# Patient Record
Sex: MALE | Race: WHITE | NOT HISPANIC OR LATINO | Employment: OTHER | URBAN - METROPOLITAN AREA
[De-identification: names, ages, dates, MRNs, and addresses within clinical notes are randomized per-mention and may not be internally consistent; named-entity substitution may affect disease eponyms.]

---

## 2017-01-27 ENCOUNTER — TRANSCRIBE ORDERS (OUTPATIENT)
Dept: ADMINISTRATIVE | Facility: HOSPITAL | Age: 77
End: 2017-01-27

## 2017-01-27 DIAGNOSIS — I73.9 PERIPHERAL VASCULAR DISEASE, UNSPECIFIED (HCC): Primary | ICD-10-CM

## 2017-02-02 ENCOUNTER — HOSPITAL ENCOUNTER (OUTPATIENT)
Dept: NON INVASIVE DIAGNOSTICS | Facility: HOSPITAL | Age: 77
Discharge: HOME | End: 2017-02-02
Payer: COMMERCIAL

## 2017-02-02 DIAGNOSIS — I73.9 PERIPHERAL VASCULAR DISEASE, UNSPECIFIED (HCC): ICD-10-CM

## 2017-02-02 PROCEDURE — 93925 LOWER EXTREMITY STUDY: CPT

## 2017-02-02 PROCEDURE — 93923 UPR/LXTR ART STDY 3+ LVLS: CPT

## 2017-12-20 ENCOUNTER — ALLSCRIPTS OFFICE VISIT (OUTPATIENT)
Dept: OTHER | Facility: OTHER | Age: 77
End: 2017-12-20

## 2017-12-21 ENCOUNTER — ALLSCRIPTS OFFICE VISIT (OUTPATIENT)
Dept: OTHER | Facility: OTHER | Age: 77
End: 2017-12-21

## 2017-12-21 NOTE — CONSULTS
Assessment   1  Diabetes mellitus (250 00) (E11 9)    Plan   Diabetes mellitus    · Jardiance 25 MG Oral Tablet; TAKE 1 TABLET BY MOUTH ONCE DAILY   Rx By: Romelia Mace; Dispense: 30 Days ; #:30 Tablet; Refill: 5;For: Diabetes mellitus; DAYNA = N; Faxed To: [facility]   · MetFORMIN HCl ER (MOD) 500 MG Oral Tablet Extended Release 24 Hour; 1 tab    bid   Rx By: Romelia Mace; Dispense: 0 Days ; #:60 Tablet Extended Release 24 Hour; Refill: 6;For: Diabetes mellitus; DAYNA = N; Faxed To: [facility]   · Trulicity 1 60 WG/6 3UI Subcutaneous Solution Pen-injector; Inject 0 75 mg once    weekly   Rx By: Romelia Mace; Dispense: 0 Days ; #:1 X 0 5 ML Pen (4 Pens); Refill: 6;For: Diabetes mellitus; DAYNA = N; Faxed To: [facility]   · *1 - [facility] DIABETES SELF MANAGEMENT TRAINING OUTPATIENT Co-Management  *     Status: Hold For - Scheduling  Requested for: [id number]   Ordered; For: Diabetes mellitus; Ordered By: Romelia Mace Performed:  Due: [id number]  Instruction : Yes  requires instruction : Yes  Needs requiring Individual DSMT? : No  Self-Management Education/Trainng : Individual Education  Care Summary provided  : Yes   · *1 - [facility] MEDICAL NUTRITION THERAPY FOR DIABETES Co-Management  *  Status:    Need Information - Financial Authorization  Requested for: [id number]   Ordered; For: Diabetes mellitus; Ordered By: Romelia Mace Performed:  Due: 46SCX5086  Care Summary provided  : Yes   · Follow-up visit in 1 month Evaluation and Treatment  Follow-up  Status: Hold For -    Scheduling  Requested for: 59Bwy1142   Ordered; For: Diabetes mellitus; Ordered By: Romelia Mace Performed:  Due: 73MCP2495    Discussion/Summary   Discussion Summary:    Uncontrolled type 2 diabetes: Discussed with the patient that based on his A1c as well as random sugar from his lab work, that he likely needs insulin for treatment of his diabetes   He would like to avoid insulin if at all possible  I suggested that he meet with diabetes educator to learn to uses glucometer again as well as to do basic medical nutrition therapy for diabetes  Additionally, I think he needs at least 3 additional non-insulin medications  I suggested adding metformin back with the extended-release form with 500 mg twice a day  I also suggested Jardiance 25 mg daily and Trulicity 6 38 mg weekly  I provided coupon cards for these medications and prescribed them to the pharmacy  The patient does not have prescription coverage as he opted out this in the past  Therefore, I am worried that these medications would be too costly  In that case, we would need to start insulin and, given his lack of prescription coverage, would likely use [facility] insulin such as NovoLog 70/30 pens or Novolin files  In either case, I still do not suspect that the addition of these 3 noninsulin medications will have the patient at goal for his blood sugars and A1c and at that point insulin would be the next step  Asked the patient to send in blood sugar logs in 2 weeks for review  Will see him back in 1 month to assess the status and see if we need to switch to insulin treatment at that time  Counseling Documentation With Imm: The patient was counseled regarding diagnostic results,-- instructions for management,-- impressions,-- importance of compliance with treatment  Medication SE Review and Pt Understands Tx: Possible side effects of new medications were reviewed with the patient/guardian today  The treatment plan was reviewed with the patient/guardian  The patient/guardian understands and agrees with the treatment plan      Chief Complaint   Chief Complaint Free Text Note Form: consult      History of Present Illness   HPI: 29-year-old male presents to establish care for type 2 diabetes  This was diagnosed about 10 years ago and he denies any known complications   He follows with his eye doctor every year as well as podiatrist regularly and denies any microvascular complications  He currently takes Actos 45 mg daily and glipizide ER 10 mg b i d  In the past he was on metformin but had diarrhea on this so this was discontinued  He has not been checking his blood sugar at home, but does report having a glucometer  He denies polyuria, polyphagia, nocturia  Denies hypoglycemia  Denies heart disease or history of stroke  He has not met with and educator or dietician recently  He reports being conscious with his diet  Denies history of nausea, vomiting, abdominal pain, pancreatitis, family history of thyroid cancer  Review of Systems   Endo Adult ROS Male New Patient:      Constitutional/General: no change in ring size,-- no change in shoe size,-- no chills,-- dizziness,-- no fainting,-- fatigue,-- no fever,-- no forgetfulness,-- no headache,-- loss of sleep,-- no recent weight loss,-- no nervousness,-- no numbness,-- no temperature intolerance,-- no excessive sweating-- and-- no weight gain  Muscle/Joint/Bone: leg pain-- and-- leg weakness, but-- no arm pain,-- no back pain,-- no hip pain,-- no foot pain,-- no neck pain,-- no hand pain,-- no shoulder pain,-- no arm weakness,-- no back weakness,-- no hip weakness,-- no foot weakness,-- no neck weakness,-- no hand weakness,-- no shoulder weakness,-- no arm numbness,-- no back numbness,-- no hip numbness,-- no leg numbness,-- no foot numbness,-- no neck numbness,-- no hand numbness-- and-- no shoulder numbness  Gastrointestinal: no constipation,-- no diarrhea,-- no excessive hunger,-- no excessive thirst,-- no nausea,-- no poor appetite,-- no rectal bleeding,-- no stomach pain,-- no vomiting-- and-- no vomiting blood  Cardiovascular: no chest pain,-- no hypertension,-- no irregular heart beat,-- no hypotension,-- no poor circulation,-- no rapid heart beat-- and-- no ankle swelling        Eye/Ear/Nose/Throat: no bleeding gums,-- no blurred vision,-- no difficulty swallowing,-- no double vision,-- no gritty eyes,-- no hoarseness,-- no hearing loss,-- no persistent cough,-- no sinus problems,-- not seeing flashes-- and-- not seeing halos  Skin: no easy bruising,-- no hives,-- no itching,-- no change in a mole,-- no rashes,-- no scar-- and-- no slow healing sores  Genitourinary no blood in urine,-- no frequent urination,-- no night time urination-- and-- no painful urination  Genitourinary - Reproductive erection difficulties, but-- no breast lump  ROS Reviewed:    ROS reviewed  Active Problems   1  Aftercare following joint replacement surgery (V54 81) (Z47 1)   2  Diabetes mellitus (250 00) (E11 9)   3  Femoral neck fracture (820 8) (S72 009A)   4  Greater trochanteric bursitis of left hip (726 5) (M70 62)   5  Hypertension (401 9) (I10)   6  Raynaud phenomenon (443 0) (I73 00)    Past Medical History   Active Problems And Past Medical History Reviewed: The active problems and past medical history were reviewed and updated today  Surgical History   1  History of Hip Replacement  Surgical History Reviewed: The surgical history was reviewed and updated today  Family History   Sister    1  Family history of    2  Family history of cardiac disorder (V17 49) (Z82 49)  Brother    3  Family history of cardiac disorder (V17 49) (Z82 49)   4  Family history of diabetes mellitus (V18 0) (Z83 3)  Family History Reviewed: The family history was reviewed and updated today  Social History    · Former smoker (N38 22) (P76 556)   · Pipe smoker (305 1) (F17 290)   · Rarely consumes alcohol (V49 89) (Z78 9)  Social History Reviewed: The social history was reviewed and updated today  Current Meds    1  Calcium + D TABS; Therapy: (Recorded:2016) to Recorded   2  Fosamax 70 MG Oral Tablet; TAKE 1 TABLET ONCE WEEKLY; Therapy: (Recorded:44Ikf1559) to Recorded   3  MetFORMIN HCl - 1000 MG Oral Tablet; TAKE 1 TABLET TWICE DAILY;      Therapy: (DGYRRXMU:[id number]) to Recorded   4  Vitamin D3 TABS; Therapy: (Recorded:16Mar2016) to Recorded  Medication List Reviewed: The medication list was reviewed and updated today  Allergies   1  No Known Drug Allergies    Vitals   Vital Signs    Recorded: 02Ynn6733 09:31AM   Heart Rate 88   Systolic 708   Diastolic 78   Height 5 ft 10 in   Weight 185 lb 8 oz   BMI Calculated 26 62   BSA Calculated 2 02     Physical Exam        Constitutional      General appearance: No acute distress, well appearing and well nourished  Eyes      Conjunctiva and lids: No swelling, erythema, or discharge  Pupils: Equal, round and reactive to light  The sclera are anicteric  Extraocular movements are intact  Ears, Nose, Mouth, and Throat      Neck: The neck is supple  The thyroid is normal in size with no palpable nodules  Pulmonary      Respiratory effort: No increased work of breathing or signs of respiratory distress  Auscultation of lungs: Clear to auscultation bilaterally with normal chest expansion  Cardiovascular      Auscultation of heart: Normal rate and rhythm with no murmurs, gallops or rubs  Examination of extremities for edema and/or varicosities: Normal        Abdomen      Abdomen: Abdomen is soft, non-tender with normal bowel sounds  Lymphatic      Palpation of lymph nodes: No supraclavicular or suboccipital lymphadenopathy  Skin      Skin and subcutaneous tissue: Normal skin temperature and color  Neurologic      Motor Strength: Strength is 5/5 bilaterally         Psychiatric      Orientation to person, place and time: Normal        Mood and affect: Affect and attention span are normal        Results/Data   Office Record Review: I have reviewed laboratory results as follows: 11/30/2017: 13, serum glucose 377, creatinine 1 07, EGFR 78, electrolytes normal, LFTs normal, 25 D 34, , triglyceride 217, urine microalbumin to creatinine ratio 29 1   urine free cortisol 40 (0-50), TSH 1 750        Signatures    Electronically signed by : MYNOR Sparks ; Dec 20 2017 10:21AM EST                       (Author)

## 2018-01-11 ENCOUNTER — ALLSCRIPTS OFFICE VISIT (OUTPATIENT)
Dept: OTHER | Facility: OTHER | Age: 78
End: 2018-01-11

## 2018-01-23 VITALS
SYSTOLIC BLOOD PRESSURE: 134 MMHG | DIASTOLIC BLOOD PRESSURE: 78 MMHG | BODY MASS INDEX: 26.56 KG/M2 | HEIGHT: 70 IN | WEIGHT: 185.5 LBS | HEART RATE: 88 BPM

## 2018-01-23 NOTE — PROGRESS NOTES
Plan    1  DSMT/MNT Time Record; Status:Complete;   Done: [id number] 12:00AM    Discussion/Summary    PATIENT EDUCATION RECORD   Indication for Services: type 2 Diabetes Mellitus  He is ready to learn  Reviewed meter options: Method: Instruction  Monitoring:   Discussed target hemoglobin A1c: Method: Instruction and Handout  Response: Verbalizes Understanding  Response: Verbalizes Understanding  Discussed Finger stick testing: Method: Instruction  Response: Verbalizes Understanding  Discussed proper stick techniques: Method: Instruction  Response: Verbalizes Understanding  Discussed testing times: Method: Instruction  Response: Verbalizes Understanding  Discussed safe lancet disposal: Method: Instruction  Response: Verbalizes Understanding  Discussed options for record keeping: Method: Instruction  Response: Verbalizes Understanding  Discussed reporting of readings to M D : Method: Instruction and Handout  Response: Verbalizes Understanding  He was instructed how to use the meter  Taking Medication: Response:  He demonstrated syringe/pen administration  Discussed site selection and rotation of injections  Method: Instruction  Response: [employer]  Discussed safe needle disposal  Method: Instruction  Response: [employer]  Discussed medication storage  Method: Instruction  Response: Verbalizes Instruction  ~He/She needs a follow up class in three months   He was given the following educational materials: Know Your Blood Glucose Numbers   Chief Complaint  Type 2 Diabetes      History of Present Illness  Met with Ramsey Flannery for DSMT initial visit, meter education and Trulicity teaching  He took ABC group class 10 years ago, doesn't remember much  States that for the first few years was controlled with just diet, but has slowly progressed and gotten worse  States he is now told his numbers were high, but doesn't have much understanding of what that means   Works 10pm to 7am, sleep 9am-5pm, works at [employer] overnight shift 14yrs  Lives alone, in charge of himself  Does not have prescription insurance coverage, states he never needed it before  So getting medication may be a challenge  Metformin only at present, Dr Tod Layne added [place] and [place]  Gave him coupon cards for each of these, which he activated already  I told him to tell them at the pharmacy that we wants to pay cash for his medications and present the coupons, see how much it will be  If he cannot afford the medication, please contact Dr Tod Layne to discuss other options  Insulin would likely be the best fix for his high sugars, but Hezekiah Bamberger was resistant to this at his Endo appt  We spent time discussing it in office today to dispel myths  Has a meter at home that is old, never used it  I instructed him on use of a meter, blood sugar in office 403 fasting coffee only  This was a shock to him, he recognizes now that yes his sugars truly are high  Discussed goal blood sugar ranges of premeal , after meal <180  Discussed it will take months to get to this range, do not be discouraged by 300s, he needs to test and send them in to Dr Tod Layne every 2 weeks for monitoring and review  Knows If sugars are low, would drink milk or eat candy  No foot issues, has a foot doctor goes there regularly  Eye doctor yearly this year, goes annually  Trulicity education completed as well, including storage, site selection and rotation, side effects, and timing  He will take it every Wednesday  Hezekiah Bamberger seemed to get confused at times, mixing up things that I said (âI do the Trulicity twice a day right? â- no, please test twice a day, Trulicity is once a week)  I typed out complete instructions for him to take with him- see below  He will get a Relion meter from [facility] to start testing as this is his cheapest option  He will return to me mid-January for blood sugar log review and initial MNT   Hezekiah Bamberger tells me his brother is a patient here as well, I encouraged him to have them work together on getting blood sugar control  Instructions I typed out for Divina Guidry:  When at the pharmacy, tell them you do not have prescription insurance and want to be cash pay  Then use the coupons  Call the coupons to activate them first before going to the pharmacy  If medications are too expensive and you won't be taking them, please let Dr Ash Powell know  [employer] brand meter - Relion Meter- no prescription, pay cash over the counter    - Need to buy a Relion meter, test strips for the meter, and Lancets (little needle)  - Test blood sugars before breakfast when you get home from work, and test when you wake up before leaving for work  - Write blood sugars on the papers we gave you and send them to Dr Ash Powell to look at every 2 weeks  Trulicity Medication- Injection once a week- Will take it Wednesday mornings  Keep all of them in the fridge  Before you take the medication, take it out of the fridge to let it warm up a little before taking          Future Appointments    Date/Time Provider Specialty Site   01/25/2018 09:15 AM ADALBERTO Pineda Endocrinology [facility] ENDOCRINOLOGY  Kourtney Woo   01/11/2018 08:30 AM Casey Holcomb RD, CDE Diabetes Educator [facility] ENDOCRINOLOGY  Donelda Foot   Electronically signed by : Rhett Stevenson, [address] [place] [address]; Dec 21 2017  1:15PM EST                       (Author)    Electronically signed by : MYNOR Colindres ; Abilio  3 2018 10:22AM EST

## 2018-01-25 ENCOUNTER — OFFICE VISIT (OUTPATIENT)
Dept: DIABETES SERVICES | Facility: HOSPITAL | Age: 78
End: 2018-01-25
Payer: COMMERCIAL

## 2018-01-25 ENCOUNTER — OFFICE VISIT (OUTPATIENT)
Dept: ENDOCRINOLOGY | Facility: HOSPITAL | Age: 78
End: 2018-01-25
Payer: COMMERCIAL

## 2018-01-25 VITALS — BODY MASS INDEX: 26.63 KG/M2 | HEIGHT: 70 IN | WEIGHT: 186 LBS

## 2018-01-25 VITALS
HEART RATE: 60 BPM | BODY MASS INDEX: 26.66 KG/M2 | DIASTOLIC BLOOD PRESSURE: 70 MMHG | WEIGHT: 186.2 LBS | SYSTOLIC BLOOD PRESSURE: 142 MMHG | HEIGHT: 70 IN

## 2018-01-25 DIAGNOSIS — E55.9 VITAMIN D DEFICIENCY: ICD-10-CM

## 2018-01-25 DIAGNOSIS — E78.2 MIXED HYPERLIPIDEMIA: ICD-10-CM

## 2018-01-25 DIAGNOSIS — IMO0001 UNCONTROLLED TYPE 2 DIABETES MELLITUS WITHOUT COMPLICATION, WITHOUT LONG-TERM CURRENT USE OF INSULIN: Primary | ICD-10-CM

## 2018-01-25 DIAGNOSIS — R79.89 DECREASED TESTOSTERONE LEVEL: ICD-10-CM

## 2018-01-25 DIAGNOSIS — M81.0 AGE RELATED OSTEOPOROSIS: ICD-10-CM

## 2018-01-25 DIAGNOSIS — E11.65 HYPERGLYCEMIA DUE TO TYPE 2 DIABETES MELLITUS (HCC): Primary | ICD-10-CM

## 2018-01-25 PROCEDURE — 99214 OFFICE O/P EST MOD 30 MIN: CPT | Performed by: NURSE PRACTITIONER

## 2018-01-25 PROCEDURE — 97802 MEDICAL NUTRITION INDIV IN: CPT | Performed by: DIETITIAN, REGISTERED

## 2018-01-25 PROCEDURE — 99999 PR OFFICE/OUTPT VISIT,PROCEDURE ONLY: CPT | Performed by: DIETITIAN, REGISTERED

## 2018-01-25 RX ORDER — METFORMIN HYDROCHLORIDE 500 MG/1
500 TABLET, EXTENDED RELEASE ORAL 2 TIMES DAILY WITH MEALS
Refills: 0 | COMMUNITY
Start: 2018-01-16 | End: 2018-10-19 | Stop reason: SDUPTHER

## 2018-01-25 RX ORDER — CLOBETASOL PROPIONATE 0.5 MG/G
CREAM TOPICAL
Status: ON HOLD | COMMUNITY
End: 2021-02-28 | Stop reason: CLARIF

## 2018-01-25 RX ORDER — GLIPIZIDE 10 MG/1
10 TABLET, FILM COATED, EXTENDED RELEASE ORAL DAILY
COMMUNITY
Start: 2016-10-21

## 2018-01-25 RX ORDER — AMPICILLIN TRIHYDRATE 250 MG
CAPSULE ORAL EVERY 24 HOURS
Status: ON HOLD | COMMUNITY
End: 2021-02-28 | Stop reason: ALTCHOICE

## 2018-01-25 RX ORDER — ERGOCALCIFEROL 1.25 MG/1
50000 CAPSULE ORAL WEEKLY
Refills: 0 | COMMUNITY
Start: 2018-01-05

## 2018-01-25 RX ORDER — PIOGLITAZONEHYDROCHLORIDE 45 MG/1
45 TABLET ORAL DAILY
COMMUNITY
Start: 2017-05-26

## 2018-01-25 RX ORDER — LANOLIN ALCOHOL/MO/W.PET/CERES
1000 CREAM (GRAM) TOPICAL DAILY
COMMUNITY

## 2018-01-25 RX ORDER — MELATONIN
DAILY
COMMUNITY
End: 2021-05-06 | Stop reason: SDUPTHER

## 2018-01-25 RX ORDER — ALENDRONATE SODIUM 70 MG/1
TABLET ORAL WEEKLY
COMMUNITY

## 2018-01-25 NOTE — PATIENT INSTRUCTIONS
Test blood sugars and then take 15units of insulin before breakfast and before dinner       Meter: put the larger end of the test strip, the end with the green on it- into the meter    Taking insulin:   - Fill the syringe with 15units of air first  - inject the air into the vial first   - flip the vial over, pull out 15units of insulin, watching for air bubbles and to not bend the needle  - inject in the stomach

## 2018-01-25 NOTE — PATIENT INSTRUCTIONS
Continue Actos 45 mg Daily  Continue Metformin ER 1000 mg twice daily  Discontinue Glipizide  Start Novolin 70/30  -  15 units twice daily at breakfast and dinner  Check blood sugars 4 times daily and return record in 2 weeks for evaluation  Meet with Diabetes Education and continue to learn about diabetes and diet

## 2018-01-25 NOTE — PROGRESS NOTES
Follow Up - Sofia Valdivia 78 y o  male MRN: [id number] @ Encounter: [id number]      Assessment/Plan     Assessment: This is a 78y o -year-old male with type 2 diabetes with hyperglycemia, hyperlipidemia, hypogonadism and osteoporosis  Plan:  1  Type 2 diabetes with hyperglycemia:  He has been having difficulty with his glucometer to accurately record his blood sugar readings  He denies any recent hypoglycemia symptoms, polyuria, polydipsia or polyphagia  He will review the process for properly checking his blood sugars with the diabetic educator today  I have asked him to stop glipizide  He will start treatment Novolin 70/30-15 units at breakfast and dinner, in addition to his Actos and metformin  He will continue to check his blood sugars 4 times daily and for the record to the office in 1-2 weeks for review and adjustment, if necessary  Reviewed recognition and proper treatment of hypoglycemic episodes  Check hemoglobin A1c, comprehensive metabolic panel and urine microalbumin  2   Hyperlipidemia:  Continue red yeast rice  Check fasting lipid panel  3   Low testosterone:  He states that he has tried AndroGel/Androderm in the past with little success  Check testosterone free and total     4   Osteoporosis:  Continue Fosamax with supplementation of calcium and ergocalciferol  He is due for a DEXA scan in April 2018  Discussed fall risk and safety  CC: Diabetes Follow Up    History of Present Illness     HPI: 78 y o  male presents in the office today for follow up of Type 2 Diabetes  he states he has been diagnosed with Type 2 Diabetes for 10 years and is checking blood glucose readings 3 times daily, however he was having difficulty successfully performing his blood glucose testing  He presents today with no blood sugar readings or meter for download  he denies episodes of hypoglycemia, polydipsia, polyphagia and polyuria      Lab Results   Component Value Date    HGBA1C 9 3 (H) 05/22/2015     Current Diabetic medication regimen is as follows: Actos 45 milligrams daily  Glipizide ER 10 milligrams daily  Metformin ER 1000 mg twice daily  He will be meeting with the diabetic educator after his office visit today to learn proper injection of insulin  He will be starting with Novolin 70/30 -15 units twice daily  he states he is up to date with his annual diabetic eye exam with Dr Miriam Reyez and denies retinopathy  he complaints of dry/cracked heels to his feet and does follow up with  podiatry for regular diabetic foot care  Last visit was earlier today  He has a history of low testosterone  He has no current testosterone free and total levels available for review  He states he has tried Androgel/Androdrem in the past with little success  For his hyperlipidemia, he is not currently taking any medication  He is currently utilizing Red Yeast Rice  For his osteoporosis, he is taking Fosamax 70 milligrams daily  He is supplementing with calcium 1200 milligrams daily and vitamin-D 66600 units weekly  He is due for his next DEXA scan in April 2018  Consults    Review of Systems   Constitutional: Negative  HENT: Negative  Eyes: Negative  Respiratory: Negative  Cardiovascular: Negative  Gastrointestinal: Negative  Endocrine: Negative  Genitourinary: Negative  Musculoskeletal: Positive for myalgias (General)  Skin: Negative  Allergic/Immunologic: Negative  Neurological: Negative  Hematological: Negative  Psychiatric/Behavioral: Negative  All other systems reviewed and are negative  Historical Information   Past Medical History:   Diagnosis Date    Hyperlipidemia      History reviewed  No pertinent surgical history    Social History   History   Alcohol use Not on file     History   Drug use: Unknown     History   Smoking Status    Current Every Day Smoker    Types: Pipe   Smokeless Tobacco    Never Used     Family History:   Family History   Problem Relation Age of Onset    No Known Problems Mother     No Known Problems Father        Meds/Allergies   Current Outpatient Prescriptions   Medication Sig Dispense Refill    alendronate (FOSAMAX) 70 mg tablet 1 tablet      aspirin 81 MG tablet every 24 hours      cholecalciferol (VITAMIN D3) 1,000 units tablet Take by mouth      clobetasol (TEMOVATE) 0 56 % cream 1 application to affected area      cyanocobalamin (VITAMIN B-12) 1,000 mcg tablet every 24 hours      ergocalciferol (VITAMIN D2) 50,000 units take 1 capsule by mouth every week  0    glipiZIDE (GLIPIZIDE XL) 10 mg 24 hr tablet 1 tablet      insulin NPH-insulin regular (NOVOLIN 70/30 RELION) 100 units/mL subcutaneous injection Inject under the skin      Insulin Syringe-Needle U-100 (RELI-ON INS SYR  5CC/29G) 29G 0 5 ML MISC by Does not apply route      metFORMIN (GLUCOPHAGE-XR) 500 mg 24 hr tablet   0    pioglitazone (ACTOS) 45 mg tablet every 24 hours      Red Yeast Rice 600 MG CAPS every 24 hours       No current facility-administered medications for this visit  Allergies   Allergen Reactions    Metformin Diarrhea       Objective   Vitals: Blood pressure 142/70, pulse 60, height 5' 10" (1 778 m), weight 84 5 kg (186 lb 3 2 oz)  Physical Exam   Constitutional: He is oriented to person, place, and time  He appears well-developed and well-nourished  HENT:   Head: Normocephalic and atraumatic  Eyes: Conjunctivae are normal  Pupils are equal, round, and reactive to light  Neck: Normal range of motion  Neck supple  Cardiovascular: Normal rate, regular rhythm and normal heart sounds  Pulmonary/Chest: Effort normal    Expiratory wheeze bilaterally   Abdominal: Soft  Bowel sounds are normal    Musculoskeletal: Normal range of motion  Neurological: He is alert and oriented to person, place, and time  Skin: Skin is warm and dry  Dry Skin   Psychiatric: He has a normal mood and affect   His behavior is normal  Judgment and thought content normal    Vitals reviewed  Lab Results:       Lab Results   Component Value Date    WBC 6 56 05/23/2015    HGB 12 3 05/23/2015    HCT 37 4 05/23/2015    MCV 97 05/23/2015     05/23/2015     Lab Results   Component Value Date/Time    BUN 20 05/23/2015 05:00 AM     05/23/2015 05:00 AM    K 4 1 05/23/2015 05:00 AM     05/23/2015 05:00 AM    CO2 26 05/23/2015 05:00 AM    CREATININE 0 80 05/23/2015 05:00 AM    AST 32 05/21/2015 08:19 PM    ALT 51 05/21/2015 08:19 PM    ALB 3 8 05/21/2015 08:19 PM       Portions of the record may have been created with voice recognition software

## 2018-01-25 NOTE — PROGRESS NOTES
Medical Nutrition Therapy    Assessment    Visit Type: Initial visit  Chief complaint Type 2 Diabetes  HPI : Met with Jess Vance for initial MNT  I have seen him twice for DSME, working on his meter and insulin  Comes today following his appt with ADALBERTO at [employer]  He is still having issues with his meter, gets an error of 88 8 each time  We reviewed his meter again, he is doing a few things wrong- putting the wrong end of the test strip in the meter, not applying enough blood, and is taking too long that meter is timing out  I had him test twice in the office, he did better each time  I previously taught him how to use an inulin pen, but due to him not having prescription coverage he will have to use the less expensive Novolin 70/30 vials from [facility]  Therefore I instructed him on use of the vial and syringe today  He did well with this, except forgetting to do the air shot into the vial first  Did better the second time  Knows to take 15u with breakfast and dinner each day  Food recall shows primary issues: eats all tv dinners, canned soups or out to eat as he lives alone and does not cook  We looked up most of his tv dinner's online, they surprising were not that bad in terms of fat and carbs as they are the small portioned ones  He is not a large eater  Discussed the importance of not eating carb free meals to prevent low blood sugar, reviewed the signs and symptoms of hypoglycemia together  Together we discussed what foods contain CHO, reading a food label, serving sizes, and set a carb goal of 45g CHO/meal to promote weight loss with 15g snacks  Put together sample meals for Ghanshyam reference and evaluated Ghanshyam current eating plan  Good understanding, Jess Vance will call with questions or for more education  No follow up needed at this time       Ht Readings from Last 1 Encounters:   01/25/18 5' 10" (1 778 m)     Wt Readings from Last 1 Encounters:   01/25/18 84 4 kg (186 lb)     Weight Change: No    Medical Diagnosis/reason for visit: E11 65    Barriers to Learning: cognitive    Do you follow any special diet presently?: No  Who shops: patient  Who cooks: patient    Food Log: Completed via the method of food recall    Breakfast: works night shift  Working day wakes up at [facility]; eats around 6:30pm; something small- can of soup, hormel meal non refridgerated; gingerale diet or cup of milk with it, and coffee with splenda,   Morning Snack: work starts at [place] [id number] sugar free cookies and coffee   Lunch: Lunch break is 2:30-3:30  Hormel meal, or campbells cream of tomato soup for microwave; or sandwich lunchmeat and a fruit cup or fresh fruit and a cup of tea or diet coke   Afternoon Snack: 5am- black plain tea, no food   Dinner:done work 7am, takes care of dog and does his pipe, then eats dinner  1 slice of pizza at a time  [employer] or marinanow or Movity  Evening Snack:nothing  Likes cottage cheese and yogurt bud doesn't do them much, not fruit on the bottom  Beverages: plain water, diet gingerale, diet coke no regular, hot tea black, coffee with splenda and milk, OJ sometimes at random, pure leaf iced tea sweetened lemon, milk at random sometimes with sugar free syrup  Eating out/Take out:often   Exercise : very active at work and works a lot around [facility]         Nutrition Diagnosis:  Food and nutrition related knowledge deficit  related to Lack of prior exposure to accurate nutrition related information as evidenced by Verbalizes inaccurate or incomplete information    Intervention: plate method, label reading and meal timing     Treatment Goals: Patient will consume 3 meals a day and Patient will monitor blood glucose, Carbs: 45 g/meal    Monitoring and evaluation:    Term code indicator  FH 1 6 3 Carbohydrate Intake Criteria: 45g carbs per meal    Patients Response to Instruction:  Comprehensiongood  Motivationgood  Expected Compliancegood    Thank you for coming to the [facility] for education today  Please feel free to call with any questions or concerns      Yadira Taveras, ARIANA, CDE, CPT  [facility] Diabetes [facility] Endocrinology  [facility] ADDICTION [facility]

## 2018-02-06 LAB
ALBUMIN SERPL-MCNC: 3.7 G/DL (ref 3.5–4.8)
ALBUMIN/CREAT UR: 33.2 MG/G CREAT (ref 0–30)
ALBUMIN/GLOB SERPL: 1.2 {RATIO} (ref 1.2–2.2)
ALP SERPL-CCNC: 86 IU/L (ref 39–117)
ALT SERPL-CCNC: 18 IU/L (ref 0–44)
AST SERPL-CCNC: 16 IU/L (ref 0–40)
BILIRUB SERPL-MCNC: 0.3 MG/DL (ref 0–1.2)
BUN SERPL-MCNC: 12 MG/DL (ref 8–27)
BUN/CREAT SERPL: 13 (ref 10–24)
CALCIUM SERPL-MCNC: 9 MG/DL (ref 8.6–10.2)
CHLORIDE SERPL-SCNC: 97 MMOL/L (ref 96–106)
CHOLEST SERPL-MCNC: 182 MG/DL (ref 100–199)
CO2 SERPL-SCNC: 27 MMOL/L (ref 18–29)
CREAT SERPL-MCNC: 0.92 MG/DL (ref 0.76–1.27)
CREAT UR-MCNC: 213.2 MG/DL
GLOBULIN SER-MCNC: 3 G/DL (ref 1.5–4.5)
GLUCOSE SERPL-MCNC: 260 MG/DL (ref 65–99)
HBA1C MFR BLD: 11.3 % (ref 4.8–5.6)
HDLC SERPL-MCNC: 48 MG/DL
LDLC SERPL CALC-MCNC: 107 MG/DL (ref 0–99)
MICROALBUMIN UR-MCNC: 70.8 UG/ML
POTASSIUM SERPL-SCNC: 4.7 MMOL/L (ref 3.5–5.2)
PROT SERPL-MCNC: 6.7 G/DL (ref 6–8.5)
SL AMB EGFR AFRICAN AMERICAN: 94 ML/MIN/1.73
SL AMB EGFR NON AFRICAN AMERICAN: 81 ML/MIN/1.73
SL AMB VLDL CHOLESTEROL CALC: 27 MG/DL (ref 5–40)
SODIUM SERPL-SCNC: 138 MMOL/L (ref 134–144)
TESTOST FREE SERPL-MCNC: 11.4 PG/ML (ref 6.6–18.1)
TESTOST SERPL-MCNC: 219 NG/DL (ref 264–916)
TRIGL SERPL-MCNC: 137 MG/DL (ref 0–149)

## 2018-02-06 NOTE — PROGRESS NOTES
Please call the patient regarding abnormal result  Hemoglobin A1c is elevated at 11 3  Please send in blood sugars for review as discussed the last visit so that your dosage may be adjusted  LDL cholesterol is only slightly high at 107  Free testosterone is normal with a slightly low total testosterone  Fasting glucose is elevated on comprehensive metabolic panel

## 2018-02-26 NOTE — PROGRESS NOTES
Plan    1  DSMT/MNT Time Record; Status:Complete;   Done: [id number] 01:42PM    Discussion/Summary    PATIENT EDUCATION RECORD   Indication for Services: type 2 Diabetes Mellitus  He is ready to learn  Monitoring:   Discussed Finger stick testing: Method: Instruction  Response: Verbalizes Understanding  Discussed proper stick techniques: Method: Instruction  Response: Verbalizes Understanding  Discussed proper techniques, benefits, and precautions: Method: Instruction  Response: Verbalizes Understanding  Discussed testing times: Method: Instruction  Response: Verbalizes Understanding  Discussed options for record keeping: Method: Instruction  Response: Verbalizes Understanding  Chief Complaint  Type 2 Diabetes      History of Present Illness  Met with Dinorah Baltazar for f/u education   DSME f/u today, not ready for MNT yet  He comes with blood sugars, but they are all 88 8  As this is not possible, I had him demonstrate technique on a demo meter in office  He is putting blood on the strip first then putting the strip in the meter, and this number is an error code  Showed him again how to test, seems to understand it now  Sugar 307 in office at [address] [facility]  He has not filled his prescriptions for diabetes medications as they will be too expensive  We discussed that his cheapest option for the effectiveness he needs is to start a mixed insulin that they sell at a low cost at [address] [facility]  He is fine with this plan as cost is a factor  Insulin pen teaching education completed  He did fine with the injection, struggled with putting the needle on and off  After the 5th try, he seems to have it down now  Discussed with Dr Beverly Worley in office, he will order Novolog 70/30 for Bessie Serrato, to start with 15u at breakfast and dinner  This will be around 7:30am and 6:30pm, before and 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  He will test his blood sugar before those meals, take his insulin, then eat  Reviewed hypoglycemia and it's treatment  Was very confuses about when to have regular soda vs diet soda, I made it easy and asked him to  some glucose tablets and use those if he ever has a low, and all of his beverages should be sugar free  Plan is for him to start taking the insulin and testing as discussed, return to me next week with sugar log to review and to complete MNT initial  Modesto Madison will need reinforcement of all topics  After Carlos Stevens left  I called [facility] to discuss insulin options  The do not have any low cost pens, only vial and syringe  Vials will be $25 each, pens are $400  Therefore, Dr Tyron Boxer will order 70/30 vials and send them to the [facility] for him to get  He will bring those with him to our session on Wed next week for me to teach him vial and syringe  I will call him later today to let him know of the plan  Instructions I typed and gave to Modesto Madison:   Insulin:  Keep the new pens in the refrigerator, the pen you are using is kept on the counter at room temperature  Each pen should last about a week, a full box should last about a month  Continue using the same pen until the plunger reaches the top and you can't turn the dial high enough to reach your dose  Throw it out, open the next one  Take the insulin before your meals of breakfast and dinner, roughly 7:30am and 6:30pm   Get some glucose tablets  If blood sugars are low- less than 100- take 4 tablets        Results/Data  DSMT/MNT Time Record [id number] 01:42PM Katrin Morrow     Test Name Result Flag Reference   Date of Service 1/11/18     Start - Stop Time 8:25am-9:30am     Total MInutes 65min     Group Or Individual Instruction DSMT-I f/u       End of Encounter Meds    1  Jardiance 25 MG Oral Tablet; TAKE 1 TABLET BY MOUTH ONCE DAILY; Therapy: [id number] to (Evaluate:18Jun2018); Last Rx:[id number] Ordered   2  MetFORMIN HCl ER (MOD) 500 MG Oral Tablet Extended Release 24 Hour; 1 tab bid; Therapy: [id number] to (Last Rx:[id number]) Ordered   3  Trulicity 9 55 QI/1 6JG Subcutaneous Solution Pen-injector; Inject 0 75 mg once weekly; Therapy: [id number] to (Last Rx:[id number]) Ordered    4  Calcium + D TABS; Therapy: (Recorded:16Mar2016) to Recorded   5  Fosamax 70 MG Oral Tablet (Alendronate Sodium); TAKE 1 TABLET ONCE WEEKLY; Therapy: (Recorded:[id number]) to Recorded   6  MetFORMIN HCl - 1000 MG Oral Tablet; TAKE 1 TABLET TWICE DAILY; Therapy: (HHDQPJ:[id number]) to Recorded   7  Vitamin D3 TABS;    Therapy: (Recorded:16Mar2016) to Recorded    Future Appointments    Date/Time Provider Specialty Site   01/25/2018 12:45 PM Kristyn Flynn  Endocrinology [facility] ENDOCRINOLOGY  [facility]   01/17/2018 07:30 AM Amaris Salazar RD, CDE Diabetes Educator [facility] ENDOCRINOLOGY  [facility]   Electronically signed by : Long Shay, [facility]; Jan 11 2018  3:52PM EST                       (Author)    Electronically signed by : MYNOR De Jesus ; Jan 12 2018  8:06AM EST

## 2018-02-27 ENCOUNTER — OFFICE VISIT (OUTPATIENT)
Dept: ENDOCRINOLOGY | Facility: HOSPITAL | Age: 78
End: 2018-02-27
Payer: COMMERCIAL

## 2018-02-27 VITALS
SYSTOLIC BLOOD PRESSURE: 122 MMHG | HEIGHT: 70 IN | DIASTOLIC BLOOD PRESSURE: 70 MMHG | HEART RATE: 70 BPM | WEIGHT: 188.6 LBS | BODY MASS INDEX: 27 KG/M2

## 2018-02-27 DIAGNOSIS — I10 ESSENTIAL HYPERTENSION: ICD-10-CM

## 2018-02-27 DIAGNOSIS — Z79.4 TYPE 2 DIABETES MELLITUS WITH HYPERGLYCEMIA, WITH LONG-TERM CURRENT USE OF INSULIN (HCC): Primary | ICD-10-CM

## 2018-02-27 DIAGNOSIS — E55.9 VITAMIN D DEFICIENCY: ICD-10-CM

## 2018-02-27 DIAGNOSIS — E29.1 HYPOGONADISM, MALE: ICD-10-CM

## 2018-02-27 DIAGNOSIS — E78.5 HYPERLIPIDEMIA, UNSPECIFIED HYPERLIPIDEMIA TYPE: ICD-10-CM

## 2018-02-27 DIAGNOSIS — E11.65 TYPE 2 DIABETES MELLITUS WITH HYPERGLYCEMIA, WITH LONG-TERM CURRENT USE OF INSULIN (HCC): Primary | ICD-10-CM

## 2018-02-27 PROCEDURE — 99214 OFFICE O/P EST MOD 30 MIN: CPT | Performed by: NURSE PRACTITIONER

## 2018-02-27 NOTE — PROGRESS NOTES
Follow Up - Casey Jones 78 y o  male MRN: [id number]   @ Encounter: [id number]      Assessment/Plan     Assessment: This is a 78y o -year-old male with type 2 diabetes with hyperglycemia, hyperlipidemia, hypogonadism and osteoporosis  Plan:  1  Type 2 diabetes with hyperglycemia:  Discussed the importance of regularly checking blood sugars when treating diabetes, especially with insulin  He will continue to check his blood sugars 4 times daily and for the record to the office in 1-2 weeks for review and adjustment, if necessary  Reviewed recognition and proper treatment of hypoglycemic episodes  explained that he may feel some hypoglycemic symptoms as his blood sugars normalize and his body re-acclimates to a normal blood sugar        2  Hyperlipidemia:  Continue red yeast rice  Check fasting lipid panel      3  Low testosterone:  He states that he has tried AndroGel/Androderm in the past with little success  Check FSH, LH, prolactin and ferritin level to further investigate his hypogonadism      4   Osteoporosis:  Continue Fosamax with supplementation of calcium and ergocalciferol  He is due for a DEXA scan in April 2018  Discussed fall risk and safety  CC: Diabetes Follow Up    History of Present Illness     HPI: 78 y o  male presents in the office today for follow up of Type 2 Diabetes  he states he has been diagnosed with Type 2 Diabetes for 10 years  He admits to not checking his blood sugars regularly and presents in the office today with no blood sugar readings are meter for download  He presents today with no blood sugar readings or meter for download  While he denies episodes of hypoglycemia, polydipsia, polyphagia and polyuria, he describes dizziness at times throughout the day but is not checking his blood sugars during these episodes  Lab Results   Component Value Date    HGBA1C 11 3 (H) 02/02/2018   Current Diabetic medication regimen is as follows:   Actos 45 milligrams daily  Metformin ER 1000 mg twice daily  Novolin 70/30-15 units at breakfast and dinner   He has met with the diabetic educator recently to discuss his diabetes and diet  he states he is up to date with his annual diabetic eye exam with Dr Jones Stage and denies retinopathy  he complaints of dry/cracked heels to his feet and does follow up with  podiatry for regular diabetic foot care       He has a history of low testosterone  His most recent free testosterone level from February 5, 2017 was 11 6 for a total testosterone of 219  He complains of some 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  He states he has tried Androgel/Androdrem in the past with little success      For his hyperlipidemia, he is not currently taking any medication  He is currently utilizing Red Yeast Rice      For his osteoporosis, he is taking Fosamax 70 milligrams daily  He is supplementing with calcium 1200 milligrams daily and vitamin-D 93245 units weekly  He is due for his next DEXA scan in April 2018  Consults    Review of Systems   Constitutional: Positive for fatigue (at times)  HENT: Negative  Eyes: Negative  Respiratory: Negative for cough and shortness of breath  Cardiovascular: Negative for chest pain and palpitations  Gastrointestinal: Negative for abdominal pain, constipation, diarrhea, nausea and vomiting  Endocrine: Negative for cold intolerance, heat intolerance, polydipsia, polyphagia and polyuria  Genitourinary: Negative  Musculoskeletal: Negative  Skin: Negative  Allergic/Immunologic: Negative  Neurological: Positive for dizziness (could possibly be related to hypoglycemia)  Negative for syncope, light-headedness and headaches  Hematological: Negative  Psychiatric/Behavioral: Negative  All other systems reviewed and are negative  Historical Information   Past Medical History:   Diagnosis Date    Hyperlipidemia      History reviewed   No pertinent surgical history  Social History   History   Alcohol Use No     History   Drug Use No     History   Smoking Status    Current Every Day Smoker    Types: Pipe   Smokeless Tobacco    Never Used     Family History:   Family History   Problem Relation Age of Onset    No Known Problems Mother     No Known Problems Father        Meds/Allergies   Current Outpatient Prescriptions   Medication Sig Dispense Refill    alendronate (FOSAMAX) 70 mg tablet 1 tablet      aspirin 81 MG tablet every 24 hours      cholecalciferol (VITAMIN D3) 1,000 units tablet Take by mouth      clobetasol (TEMOVATE) 9 84 % cream 1 application to affected area      cyanocobalamin (VITAMIN B-12) 1,000 mcg tablet every 24 hours      ergocalciferol (VITAMIN D2) 50,000 units take 1 capsule by mouth every week  0    glipiZIDE (GLIPIZIDE XL) 10 mg 24 hr tablet 1 tablet      insulin NPH-insulin regular (NOVOLIN 70/30 RELION) 100 units/mL subcutaneous injection Inject 15 Units under the skin 2 (two) times a day        Insulin Syringe-Needle U-100 (RELI-ON INS SYR  5CC/29G) 29G 0 5 ML MISC by Does not apply route      metFORMIN (GLUCOPHAGE-XR) 500 mg 24 hr tablet   0    pioglitazone (ACTOS) 45 mg tablet every 24 hours      Red Yeast Rice 600 MG CAPS every 24 hours       No current facility-administered medications for this visit  Allergies   Allergen Reactions    Metformin Diarrhea       Objective   Vitals: Blood pressure 122/70, pulse 70, height 5' 10" (1 778 m), weight 85 5 kg (188 lb 9 6 oz)  [unfilled]  Invasive Devices          No matching active lines, drains, or airways          Physical Exam   Constitutional: He is oriented to person, place, and time  He appears well-developed and well-nourished  No distress  HENT:   Head: Normocephalic and atraumatic  Nose: Nose normal    Mouth/Throat: Oropharynx is clear and moist    Eyes: Conjunctivae and EOM are normal  Pupils are equal, round, and reactive to light   Left eye exhibits no discharge  Neck: Normal range of motion  Neck supple  Thyromegaly present  Cardiovascular: Normal rate, regular rhythm, normal heart sounds and intact distal pulses  Pulmonary/Chest: Effort normal and breath sounds normal  No respiratory distress  He has no wheezes  Abdominal: Soft  Bowel sounds are normal  He exhibits no distension  There is no tenderness  Musculoskeletal: Normal range of motion  He exhibits no edema  Neurological: He is alert and oriented to person, place, and time  Skin: Skin is warm and dry  Dry skin   Psychiatric: He has a normal mood and affect  His behavior is normal  Judgment and thought content normal          Lab Results:       Lab Results   Component Value Date    WBC 6 56 05/23/2015    HGB 12 3 05/23/2015    HCT 37 4 05/23/2015    MCV 97 05/23/2015     05/23/2015     Lab Results   Component Value Date/Time    BUN 12 02/02/2018 07:52 AM     05/23/2015 05:00 AM    K 4 1 05/23/2015 05:00 AM     05/23/2015 05:00 AM    CO2 26 05/23/2015 05:00 AM    CREATININE 0 92 02/02/2018 07:52 AM    CREATININE 0 80 05/23/2015 05:00 AM    AST 32 05/21/2015 08:19 PM    ALT 51 05/21/2015 08:19 PM    ALB 3 8 05/21/2015 08:19 PM     No results for input(s): CHOL, HDL, LDL, TRIG, VLDL in the last 72 hours  No results found for: Giorgio Verasa  No results found for: POCGLU    Portions of the record may have been created with voice recognition software

## 2018-02-27 NOTE — PATIENT INSTRUCTIONS
Continue to be mindful of diet  When you feel symptoms of hypoglycemia, please check your blood sugars  Treat with juice, soda or glucose tablets if your blood sugar is below 70-80  Otherwise, feeling these symptoms may result with a normal blood sugar as you reacclimatize to normal blood sugar  Check blood sugars 4 times daily, write them down and forward record to the office for review in 1-2 weeks  Obtain extended Testosterone lab work before next visit

## 2018-03-26 LAB
ALBUMIN SERPL-MCNC: 4.1 G/DL (ref 3.5–4.8)
ALBUMIN/GLOB SERPL: 1.4 {RATIO} (ref 1.2–2.2)
ALP SERPL-CCNC: 75 IU/L (ref 39–117)
ALT SERPL-CCNC: 24 IU/L (ref 0–44)
AMBIG ABBREV DEFAULT: NORMAL
AST SERPL-CCNC: 19 IU/L (ref 0–40)
BILIRUB SERPL-MCNC: 0.4 MG/DL (ref 0–1.2)
BUN SERPL-MCNC: 15 MG/DL (ref 8–27)
BUN/CREAT SERPL: 17 (ref 10–24)
CALCIUM SERPL-MCNC: 9 MG/DL (ref 8.6–10.2)
CHLORIDE SERPL-SCNC: 104 MMOL/L (ref 96–106)
CO2 SERPL-SCNC: 25 MMOL/L (ref 18–29)
CREAT SERPL-MCNC: 0.9 MG/DL (ref 0.76–1.27)
FERRITIN SERPL-MCNC: 241 NG/ML (ref 30–400)
FSH SERPL-ACNC: 6.5 MIU/ML (ref 1.5–12.4)
GLOBULIN SER-MCNC: 3 G/DL (ref 1.5–4.5)
GLUCOSE SERPL-MCNC: 213 MG/DL (ref 65–99)
HBA1C MFR BLD: 11.5 % (ref 4.8–5.6)
LH SERPL-ACNC: 6.3 MIU/ML (ref 1.7–8.6)
POTASSIUM SERPL-SCNC: 4.4 MMOL/L (ref 3.5–5.2)
PROLACTIN SERPL-MCNC: 17.6 NG/ML (ref 4–15.2)
PROT SERPL-MCNC: 7.1 G/DL (ref 6–8.5)
SL AMB EGFR AFRICAN AMERICAN: 96 ML/MIN/1.73
SL AMB EGFR NON AFRICAN AMERICAN: 83 ML/MIN/1.73
SODIUM SERPL-SCNC: 145 MMOL/L (ref 134–144)
TESTOST FREE SERPL-MCNC: 9.2 PG/ML (ref 6.6–18.1)
TESTOST SERPL-MCNC: 256 NG/DL (ref 264–916)

## 2018-03-27 NOTE — PROGRESS NOTES
Please call the patient regarding abnormal result  Fasting glucose is elevated on CMP as was Sodium level  Hemoglobin A1c has elevated to 11 5  He must send in his blood sugars for review to the changes can be made to better control his blood sugar  His free testosterone was normal with a slightly low total testosterone however, this may improve as his blood sugars improved  FSH and LH were normal and prolactin was slightly elevated  We will re-evaluate this with the next lab draw

## 2018-03-29 ENCOUNTER — OFFICE VISIT (OUTPATIENT)
Dept: ENDOCRINOLOGY | Facility: HOSPITAL | Age: 78
End: 2018-03-29
Payer: COMMERCIAL

## 2018-03-29 VITALS
WEIGHT: 186 LBS | BODY MASS INDEX: 26.63 KG/M2 | SYSTOLIC BLOOD PRESSURE: 124 MMHG | HEIGHT: 70 IN | HEART RATE: 82 BPM | DIASTOLIC BLOOD PRESSURE: 74 MMHG

## 2018-03-29 DIAGNOSIS — IMO0001 UNCONTROLLED TYPE 2 DIABETES MELLITUS WITHOUT COMPLICATION, WITHOUT LONG-TERM CURRENT USE OF INSULIN: Primary | ICD-10-CM

## 2018-03-29 DIAGNOSIS — I10 ESSENTIAL HYPERTENSION: ICD-10-CM

## 2018-03-29 DIAGNOSIS — E78.5 HYPERLIPIDEMIA, UNSPECIFIED HYPERLIPIDEMIA TYPE: ICD-10-CM

## 2018-03-29 DIAGNOSIS — M81.0 AGE RELATED OSTEOPOROSIS, UNSPECIFIED PATHOLOGICAL FRACTURE PRESENCE: ICD-10-CM

## 2018-03-29 DIAGNOSIS — E29.1 HYPOGONADISM, MALE: ICD-10-CM

## 2018-03-29 PROCEDURE — 99214 OFFICE O/P EST MOD 30 MIN: CPT | Performed by: NURSE PRACTITIONER

## 2018-03-29 PROCEDURE — 95250 CONT GLUC MNTR PHYS/QHP EQP: CPT | Performed by: NURSE PRACTITIONER

## 2018-03-29 NOTE — PROGRESS NOTES
Griselda Parnell 78 y o  male MRN: [id number]    Encounter: [id number]      Assessment/Plan     Assessment: This is a 78y o -year-old male withThis is a 78y o -year-old male with type 2 diabetes with hyperglycemia, hyperlipidemia, hypogonadism and osteoporosis  Plan:  1   Type 2 diabetes with hyperglycemia:  He presents in the office today with only 5 blood sugar readings over the past 2 weeks  I had him demonstrate the proper technique for taking his blood sugars during the exam which he accomplished without issue  His glucometer reading in the office was 258  Discussed the importance of regularly checking blood sugars when treating diabetes, especially with insulin    He will continue to check his blood sugars at least 2 times daily at alternating times and forward the record to the office in 1 week for review and adjustment, if necessary  And to gather more information on his glycemic control throughout the day, I have asked him to wear a [employer] sensor for 2 weeks  Sabina Mansfield recognition and proper treatment of hypoglycemic episodes  Explained that he may feel some hypoglycemic symptoms as his blood sugars normalize and his body re-acclimates to a normal blood sugar  check hemoglobin A1c in June 2018      2   Hyperlipidemia:  Stable  Continue red yeast rice       3   Low testosterone:  His free testosterone was in normal range with just a slightly low total testosterone  Will discuss at next visit   Casper Vences states that he has tried AndroGel/Androderm in the past with little success        4   Osteoporosis:  Continue Fosamax with supplementation of calcium and ergocalciferol  Casper Vences is due for a DEXA scan in April 2018   Discussed fall risk and safety at length during the time of the visit  CC: Type 2 Diabetes    History of Present Illness     HPI:  78 y  o  male presents in the office today for follow up of Type 2 Diabetes  he states he has been diagnosed with Type 2 Diabetes for 10 years    He admits to not checking his blood sugars regularly and presents in the office today [facility] presents today with only 5 blood sugar readings in the past 2 weeks    While he denies episodes of hypoglycemia, polydipsia, polyphagia and polyuria, he describes dizziness at times throughout the day but is not checking his blood sugars during these episodes  Lab Results   Component Value Date     HGBA1C 11 5 (H) 03/22/2018   Current Diabetic medication regimen is as follows: Actos 45 milligrams daily  Metformin ER 1000 mg twice daily  Novolin 70/30-15 units at breakfast and dinner   He has met with the diabetic educator recently to discuss his diabetes and diet  he states he is up to date with his annual diabetic eye exam with Dr Jamshid March denies retinopathy   he complaints of dry/cracked heels to his feet and does follow up with podiatry for regular diabetic foot care with Dr Julián Jenkins       He has a history of low testosterone  His most recent free testosterone level from March 22, 2018 was 9 2 for a total testosterone of 256  He complains of some 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  He states he has tried Androgel/Androdrem in the past with little success      For his hyperlipidemia, he is not currently taking any medication   He is currently utilizing Red Yeast Rice      For his osteoporosis, he is taking Fosamax 70 milligrams daily  [facility] is supplementing with calcium 1200 milligrams daily and vitamin-D 95208 units weekly  [facility] is due for his next DEXA scan in April 2018  Review of Systems   Constitutional: Positive for fatigue (Generalized)  Negative for chills and fever  HENT: Positive for dental problem  Drooling:  poor dentition  Eyes: Negative for photophobia, pain, discharge, redness, itching and visual disturbance  Respiratory: Negative for cough and shortness of breath  Cardiovascular: Negative for chest pain and palpitations     Gastrointestinal: Negative for abdominal pain, constipation, diarrhea, nausea and vomiting  Endocrine: Positive for polydipsia ( at times) and polyuria ( at times)  Negative for cold intolerance, heat intolerance and polyphagia  Genitourinary: Negative  Musculoskeletal: Positive for arthralgias ( left hip) and back pain ( chronic)  Skin: Negative  Allergic/Immunologic: Negative  Neurological: Positive for dizziness  Negative for syncope, light-headedness and headaches  Facial asymmetry:  at times  Unsure if it is associated with hypoglycemia  Hematological: Negative  Psychiatric/Behavioral: Negative  All other systems reviewed and are negative  Historical Information   Past Medical History:   Diagnosis Date    Hyperlipidemia      History reviewed  No pertinent surgical history    Social History   History   Alcohol Use No     History   Drug Use No     History   Smoking Status    Current Every Day Smoker    Types: Pipe   Smokeless Tobacco    Never Used     Family History:   Family History   Problem Relation Age of Onset    No Known Problems Mother     Arthritis Father     Diabetes unspecified Brother        Meds/Allergies   Current Outpatient Prescriptions   Medication Sig Dispense Refill    alendronate (FOSAMAX) 70 mg tablet 1 tablet      aspirin 81 MG tablet every 24 hours      cholecalciferol (VITAMIN D3) 1,000 units tablet Take by mouth      clobetasol (TEMOVATE) 6 31 % cream 1 application to affected area      cyanocobalamin (VITAMIN B-12) 1,000 mcg tablet every 24 hours      ergocalciferol (VITAMIN D2) 50,000 units take 1 capsule by mouth every week  0    glipiZIDE (GLIPIZIDE XL) 10 mg 24 hr tablet 1 tablet      insulin NPH-insulin regular (NOVOLIN 70/30 RELION) 100 units/mL subcutaneous injection Inject 15 Units under the skin 2 (two) times a day        Insulin Syringe-Needle U-100 (RELI-ON INS SYR  5CC/29G) 29G 0 5 ML MISC by Does not apply route      metFORMIN (GLUCOPHAGE-XR) 500 mg 24 hr tablet   0    pioglitazone (ACTOS) 45 mg tablet every 24 hours      Red Yeast Rice 600 MG CAPS every 24 hours       No current facility-administered medications for this visit  Allergies   Allergen Reactions    Metformin Diarrhea       Objective   Vitals: Blood pressure 124/74, pulse 82, height 5' 10" (1 778 m), weight 84 4 kg (186 lb)  Physical Exam   Constitutional: He is oriented to person, place, and time  He appears well-developed and well-nourished  No distress  HENT:   Head: Normocephalic and atraumatic  Nose: Nose normal    Mouth/Throat: Oropharynx is clear and moist    Eyes: Conjunctivae and EOM are normal  Pupils are equal, round, and reactive to light  Right eye exhibits no discharge  Left eye exhibits no discharge  Neck: Normal range of motion  Neck supple  No tracheal deviation present  No thyromegaly present  Cardiovascular: Normal rate, regular rhythm, normal heart sounds and intact distal pulses  Pulmonary/Chest: Effort normal and breath sounds normal  No respiratory distress  He has no wheezes  He has no rales  He exhibits no tenderness  Abdominal: Soft  Bowel sounds are normal  He exhibits no distension  There is no tenderness  Musculoskeletal: Normal range of motion  He exhibits no edema, tenderness or deformity  Stiffness to left hip and leg with ROM   Lymphadenopathy:     He has no cervical adenopathy  Neurological: He is alert and oriented to person, place, and time  No cranial nerve deficit  Coordination normal    Skin: Skin is warm and dry  No rash noted  No erythema  No pallor  Dry skin   Psychiatric: He has a normal mood and affect  His behavior is normal  Judgment and thought content normal    Vitals reviewed        Lab Results:   Lab Results   Component Value Date/Time    Hemoglobin A1C 11 5 (H) 03/22/2018 07:13 AM    Hemoglobin A1C 11 3 (H) 02/02/2018 07:52 AM    BUN 15 03/22/2018 07:13 AM    BUN 12 02/02/2018 07:52 AM    Creatinine, Serum 0 90 03/22/2018 07:13 AM Creatinine, Serum 0 92 02/02/2018 07:52 AM    SL AMB LDL-CHOLESTEROL 107 (H) 02/02/2018 07:52 AM    Triglycerides 137 02/02/2018 07:52 AM       Portions of the record may have been created with voice recognition software  Occasional wrong word or "sound a like" substitutions may have occurred due to the inherent limitations of voice recognition software  Read the chart carefully and recognize, using context, where substitutions have occurred

## 2018-03-29 NOTE — PATIENT INSTRUCTIONS
Be mindful of diet  Increase activity  Be consistent with taking your medication, including insulin  Check blood sugars at least twice daily at alternating times as discussed  We will start a [email or web address] professional sensor today to aid in getting information about your blood sugars  Please comeback to the office to have it removed when instructed

## 2018-04-12 ENCOUNTER — OFFICE VISIT (OUTPATIENT)
Dept: ENDOCRINOLOGY | Facility: HOSPITAL | Age: 78
End: 2018-04-12
Payer: COMMERCIAL

## 2018-04-12 ENCOUNTER — TELEPHONE (OUTPATIENT)
Dept: ENDOCRINOLOGY | Facility: HOSPITAL | Age: 78
End: 2018-04-12

## 2018-04-12 DIAGNOSIS — IMO0001 UNCONTROLLED TYPE 2 DIABETES MELLITUS WITHOUT COMPLICATION, WITHOUT LONG-TERM CURRENT USE OF INSULIN: ICD-10-CM

## 2018-04-12 PROCEDURE — 95251 CONT GLUC MNTR ANALYSIS I&R: CPT | Performed by: INTERNAL MEDICINE

## 2018-04-12 NOTE — PROGRESS NOTES
Continous glucose monitoring [facility] intrepretation     Date/Time 4/12/2018 11:06 AM     Performed by  Mel Fowler by Jane Escobar       Consent: Verbal consent obtained  Written consent obtained    Risks and benefits: risks, benefits and alternatives were discussed  Consent given by: patient  Patient understanding: patient states understanding of the procedure being performed  Patient consent: the patient's understanding of the procedure matches consent given  Procedure consent: procedure consent matches procedure scheduled  Relevant documents: relevant documents present and verified  Test results: test results available and properly labeled  Required items: required blood products, implants, devices, and special equipment available  Patient identity confirmed: verbally with patient      Local anesthesia used: no     Anesthesia   Local anesthesia used: no     Sedation   Patient sedated: no      Specimen: no    Culture: no

## 2018-04-12 NOTE — TELEPHONE ENCOUNTER
Further trouble for sleeping he can follow up with his primary care physician or we can refer him to Dr Janell Neff for sleep medicine evaluation  At those times when he feels dizzy, and his blood sugar is normal, he can utilize 1 lifesaver to help raise his blood sugars just enough to have him feel better and minimize the symptoms  However, he should not overdo it

## 2018-04-12 NOTE — TELEPHONE ENCOUNTER
Pt said he also forgot to say he is having trouble sleeping  I told him that might be something he has to talk to his PCP about  He asked how he is supposed to work if he is still feeling dizzy?

## 2018-04-12 NOTE — TELEPHONE ENCOUNTER
Freestyle Carmina testing was interpreted and reveals blood sugars consistently elevated in the 250-350 range with no episodes of hypoglycemia  He may increase his Novolin 70/30 dose from 15 units to 20 units at breakfast and dinner to aid him in decreasing his blood sugars throughout the day  He may be feeling symptoms of dizziness while taking the Novolin injections because his body has become use 2 blood sugars in the 300-400 range  This was discussed at his recent visit  If he feels symptoms of hypoglycemia, such as dizziness, he is to check his blood sugars and only treat if his blood sugars less than 80  Please continue checking blood sugars 4 times daily and send to the office in 1-2 weeks for review  He may need further increases in his Novolin dose to achieve glycemic control throughout the day

## 2018-04-18 ENCOUNTER — HOSPITAL ENCOUNTER (OUTPATIENT)
Dept: BONE DENSITY | Facility: IMAGING CENTER | Age: 78
Discharge: HOME | End: 2018-04-18
Payer: COMMERCIAL

## 2018-04-18 DIAGNOSIS — M81.0 AGE RELATED OSTEOPOROSIS, UNSPECIFIED PATHOLOGICAL FRACTURE PRESENCE: ICD-10-CM

## 2018-04-18 PROCEDURE — 77080 DXA BONE DENSITY AXIAL: CPT

## 2018-04-20 NOTE — PROGRESS NOTES
Please call the patient regarding abnormal result  Most recent DEXA scan reveals a 7% decrease in bone mineral density in his right hip with no changes to his lumbar spine  Can we confirm how long he has been taking Fosamax?

## 2018-05-17 ENCOUNTER — TELEPHONE (OUTPATIENT)
Dept: ENDOCRINOLOGY | Facility: HOSPITAL | Age: 78
End: 2018-05-17

## 2018-05-17 ENCOUNTER — OFFICE VISIT (OUTPATIENT)
Dept: ENDOCRINOLOGY | Facility: HOSPITAL | Age: 78
End: 2018-05-17
Payer: COMMERCIAL

## 2018-05-17 VITALS
BODY MASS INDEX: 26.77 KG/M2 | DIASTOLIC BLOOD PRESSURE: 70 MMHG | HEIGHT: 70 IN | SYSTOLIC BLOOD PRESSURE: 134 MMHG | HEART RATE: 86 BPM | WEIGHT: 187 LBS

## 2018-05-17 DIAGNOSIS — E78.5 HYPERLIPIDEMIA, UNSPECIFIED HYPERLIPIDEMIA TYPE: ICD-10-CM

## 2018-05-17 DIAGNOSIS — E29.1 HYPOGONADISM, MALE: ICD-10-CM

## 2018-05-17 DIAGNOSIS — IMO0001 UNCONTROLLED TYPE 2 DIABETES MELLITUS WITHOUT COMPLICATION, WITHOUT LONG-TERM CURRENT USE OF INSULIN: Primary | ICD-10-CM

## 2018-05-17 DIAGNOSIS — I10 ESSENTIAL HYPERTENSION: ICD-10-CM

## 2018-05-17 DIAGNOSIS — M81.0 AGE RELATED OSTEOPOROSIS, UNSPECIFIED PATHOLOGICAL FRACTURE PRESENCE: ICD-10-CM

## 2018-05-17 PROCEDURE — 99214 OFFICE O/P EST MOD 30 MIN: CPT | Performed by: NURSE PRACTITIONER

## 2018-05-17 NOTE — PATIENT INSTRUCTIONS
Be mindful of diet  Increase activity  Be consistent with taking your medication, including insulin  Check blood sugars at least twice daily at alternating times as discussed  Obtain lab work in June 2018 ordered at last visit       Continue Fosamax with daily calcium and vitamin D supplementation  Follow up with diabetes education to review your meter, again

## 2018-05-17 NOTE — PROGRESS NOTES
Leighann Ontiveros 78 y o  male MRN: [id number]    Encounter: [id number]      Assessment/Plan     Assessment: This is a 78y o -year-old male with type 2 diabetes with hyperglycemia, hyperlipidemia, hypogonadism and osteoporosis  Plan:  1   Type 2 diabetes with hyperglycemia:  He presents in the office today with only error blood sugar readings over the past 2 weeks  I had him demonstrate the proper technique for taking his blood sugars during the exam which he accomplished without issue  His glucometer reading in the office was 268  Discussed the importance of regularly checking blood sugars when treating diabetes, especially with insulin  Grazyna Toussaint will meet with diabetic education at the end of his office visit today to review his glucometer and the proper process for checking his blood sugars    He will continue to check his blood sugars at least 2 times daily at alternating times and forward the record to the office in 1 week for review and adjustment, if necessary  Reviewed recognition and proper treatment of hypoglycemic episodes  Explained that he may feel some hypoglycemic symptoms as his blood sugars normalize and his body re-acclimates to a normal blood sugar    Check hemoglobin A1c in June 2018      2   Hyperlipidemia:  Stable  Continue red yeast rice       3   Low testosterone:  His free testosterone was in normal range with just a slightly low total testosterone  Will discuss at next visit   Grazyna Toussaint states that he has tried AndroGel/Androderm in the past with little success        4   Osteoporosis:  His most recent DEXA scan from April 2018 reveals continued low bone mineral density in his right hip  Continue Fosamax with supplementation of calcium and ergocalciferol   Discussed fall risk and safety at length during the time of the visit  CC:  Type 2 Diabetes follow-up    History of Present Illness     HPI:  78 y  o  male presents in the office today for follow up of Type 2 Diabetes   he states he has been diagnosed with Type 2 Diabetes for 10 years  Nicki Chan admits to not checking his blood sugars regularly and presents in the office today Nicki Chan presents today with only 5 blood sugar readings in the past 2 weeks    While he denies episodes of hypoglycemia, polydipsia, polyphagia and polyuria, he describes dizziness at times throughout the day but is not checking his blood sugars during these episodes             Lab Results   Component Value Date     HGBA1C 11 5 (H) 03/22/2018   Current Diabetic medication regimen is as follows: Actos 45 milligrams daily  Metformin ER 1000 mg twice daily  Novolin 70/30-20 units at breakfast and dinner   He has met with the diabetic educator recently to discuss his diabetes and diet  he states he is up to date with his annual diabetic eye exam with Dr Philomena Benavides denies retinopathy   he complaints of dry/cracked heels to his feet and does follow up with podiatry for regular diabetic foot care with Dr Ashley iEsenberg       He has a history of low testosterone   His most recent free testosterone level from March 22, 2018 was 9 2 for a total testosterone of 256   He complains of some 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  Nicki Chan states he has tried Androgel/Androdrem in the past with little success      For his hyperlipidemia, he is not currently taking any medication  Nicki Chan is currently utilizing Red Yeast Rice      For his osteoporosis, he is taking Fosamax 70 milligrams daily  Nicki Chan is supplementing with calcium 1200 milligrams daily and vitamin-D 76397 units weekly   His most recent DEXA scan shows continued osteopenia in the right hip with a T-score of-1 9  Review of Systems   Constitutional: Negative  Negative for chills, fatigue and fever  HENT: Negative  Eyes: Negative  Respiratory: Negative for cough and shortness of breath  Cardiovascular: Negative for chest pain and palpitations     Gastrointestinal: Negative for abdominal pain, constipation, diarrhea, nausea and vomiting  Endocrine: Negative for cold intolerance, heat intolerance, polydipsia, polyphagia and polyuria  Genitourinary: Negative  Musculoskeletal: Positive for arthralgias (Generalized) and myalgias ( generalized)  Skin: Negative  Allergic/Immunologic: Negative  Neurological: Negative for dizziness, syncope, light-headedness and headaches  Hematological: Negative  Psychiatric/Behavioral: The patient is nervous/anxious ( at times)  All other systems reviewed and are negative  Historical Information   Past Medical History:   Diagnosis Date    Hyperlipidemia      No past surgical history on file    Social History   History   Alcohol Use No     History   Drug Use No     History   Smoking Status    Current Every Day Smoker    Types: Pipe   Smokeless Tobacco    Never Used     Family History:   Family History   Problem Relation Age of Onset    No Known Problems Mother     Arthritis Father     Diabetes unspecified Brother        Meds/Allergies   Current Outpatient Prescriptions   Medication Sig Dispense Refill    alendronate (FOSAMAX) 70 mg tablet 1 tablet      aspirin 81 MG tablet every 24 hours      cholecalciferol (VITAMIN D3) 1,000 units tablet Take by mouth      clobetasol (TEMOVATE) 1 91 % cream 1 application to affected area      cyanocobalamin (VITAMIN B-12) 1,000 mcg tablet every 24 hours      ergocalciferol (VITAMIN D2) 50,000 units take 1 capsule by mouth every week  0    glipiZIDE (GLIPIZIDE XL) 10 mg 24 hr tablet 1 tablet      insulin NPH-insulin regular (NOVOLIN 70/30 RELION) 100 units/mL subcutaneous injection Inject 15 Units under the skin 2 (two) times a day        Insulin Syringe-Needle U-100 (RELI-ON INS SYR  5CC/29G) 29G 0 5 ML MISC by Does not apply route      metFORMIN (GLUCOPHAGE-XR) 500 mg 24 hr tablet   0    pioglitazone (ACTOS) 45 mg tablet every 24 hours      Red Yeast Rice 600 MG CAPS every 24 hours       No current facility-administered medications for this visit  Allergies   Allergen Reactions    Metformin Diarrhea       Objective   Vitals: Blood pressure 134/70, pulse 86, height 5' 10" (1 778 m), weight 84 8 kg (187 lb)  Physical Exam   Constitutional: He is oriented to person, place, and time  He appears well-developed and well-nourished  No distress  HENT:   Head: Normocephalic and atraumatic  Nose: Nose normal    Mouth/Throat: Oropharynx is clear and moist    Poor dentition   Eyes: Conjunctivae and EOM are normal  Pupils are equal, round, and reactive to light  Right eye exhibits no discharge  Left eye exhibits no discharge  Neck: Normal range of motion  Neck supple  No JVD present  No tracheal deviation present  No thyromegaly present  Cardiovascular: Normal rate, regular rhythm, normal heart sounds and intact distal pulses  Pulmonary/Chest: Effort normal and breath sounds normal  No stridor  No respiratory distress  He has no wheezes  He has no rales  He exhibits no tenderness  Abdominal: Soft  Bowel sounds are normal  He exhibits no distension  There is no tenderness  Musculoskeletal: Normal range of motion  He exhibits no edema, tenderness or deformity  Lymphadenopathy:     He has no cervical adenopathy  Neurological: He is alert and oriented to person, place, and time  No cranial nerve deficit  Coordination normal    Skin: Skin is warm and dry  No rash noted  No erythema  No pallor  Dry skin   Psychiatric: He has a normal mood and affect  His behavior is normal  Judgment and thought content normal    Vitals reviewed        Lab Results:   Lab Results   Component Value Date/Time    Hemoglobin A1C 11 5 (H) 03/22/2018 07:13 AM    Hemoglobin A1C 11 3 (H) 02/02/2018 07:52 AM    BUN 15 03/22/2018 07:13 AM    BUN 12 02/02/2018 07:52 AM    Creatinine, Serum 0 90 03/22/2018 07:13 AM    Creatinine, Serum 0 92 02/02/2018 07:52 AM    SL AMB LDL-CHOLESTEROL 107 (H) 02/02/2018 07:52 AM Triglycerides 137 02/02/2018 07:52 AM       Portions of the record may have been created with voice recognition software  Occasional wrong word or "sound a like" substitutions may have occurred due to the inherent limitations of voice recognition software  Read the chart carefully and recognize, using context, where substitutions have occurred

## 2018-06-20 LAB
ALBUMIN SERPL-MCNC: 4.5 G/DL (ref 3.5–4.8)
ALBUMIN/GLOB SERPL: 1.4 {RATIO} (ref 1.2–2.2)
ALP SERPL-CCNC: 73 IU/L (ref 39–117)
ALT SERPL-CCNC: 23 IU/L (ref 0–44)
AMBIG ABBREV DEFAULT: NORMAL
AST SERPL-CCNC: 21 IU/L (ref 0–40)
BILIRUB SERPL-MCNC: 0.5 MG/DL (ref 0–1.2)
BUN SERPL-MCNC: 22 MG/DL (ref 8–27)
BUN/CREAT SERPL: 21 (ref 10–24)
CALCIUM SERPL-MCNC: 9.4 MG/DL (ref 8.6–10.2)
CHLORIDE SERPL-SCNC: 99 MMOL/L (ref 96–106)
CO2 SERPL-SCNC: 25 MMOL/L (ref 20–29)
CREAT SERPL-MCNC: 1.06 MG/DL (ref 0.76–1.27)
EST. AVERAGE GLUCOSE BLD GHB EST-MCNC: 280 MG/DL
GLOBULIN SER-MCNC: 3.2 G/DL (ref 1.5–4.5)
GLUCOSE SERPL-MCNC: 290 MG/DL (ref 65–99)
HBA1C MFR BLD: 11.4 % (ref 4.8–5.6)
POTASSIUM SERPL-SCNC: 4.5 MMOL/L (ref 3.5–5.2)
PROLACTIN SERPL-MCNC: 18.3 NG/ML (ref 4–15.2)
PROT SERPL-MCNC: 7.7 G/DL (ref 6–8.5)
SL AMB EGFR AFRICAN AMERICAN: 79 ML/MIN/1.73
SL AMB EGFR NON AFRICAN AMERICAN: 68 ML/MIN/1.73
SODIUM SERPL-SCNC: 141 MMOL/L (ref 134–144)

## 2018-10-19 DIAGNOSIS — E11.65 TYPE 2 DIABETES MELLITUS WITH HYPERGLYCEMIA, WITHOUT LONG-TERM CURRENT USE OF INSULIN (HCC): Primary | ICD-10-CM

## 2018-10-19 RX ORDER — METFORMIN HYDROCHLORIDE 500 MG/1
TABLET, EXTENDED RELEASE ORAL
Qty: 60 TABLET | Refills: 6 | Status: SHIPPED | OUTPATIENT
Start: 2018-10-19 | End: 2019-05-09 | Stop reason: SDUPTHER

## 2019-05-06 ENCOUNTER — TRANSCRIBE ORDERS (OUTPATIENT)
Dept: ADMINISTRATIVE | Facility: HOSPITAL | Age: 79
End: 2019-05-06

## 2019-05-09 DIAGNOSIS — E11.65 TYPE 2 DIABETES MELLITUS WITH HYPERGLYCEMIA, WITHOUT LONG-TERM CURRENT USE OF INSULIN (HCC): ICD-10-CM

## 2019-05-09 RX ORDER — METFORMIN HYDROCHLORIDE 500 MG/1
TABLET, EXTENDED RELEASE ORAL
Qty: 60 TABLET | Refills: 6 | Status: ON HOLD | OUTPATIENT
Start: 2019-05-09 | End: 2021-02-28 | Stop reason: ALTCHOICE

## 2020-06-30 LAB
LEFT EYE DIABETIC RETINOPATHY: NORMAL
RIGHT EYE DIABETIC RETINOPATHY: NORMAL

## 2021-02-28 ENCOUNTER — APPOINTMENT (EMERGENCY)
Dept: CT IMAGING | Facility: HOSPITAL | Age: 81
End: 2021-02-28
Attending: EMERGENCY MEDICINE
Payer: COMMERCIAL

## 2021-02-28 ENCOUNTER — APPOINTMENT (EMERGENCY)
Dept: RADIOLOGY | Facility: HOSPITAL | Age: 81
End: 2021-02-28
Payer: COMMERCIAL

## 2021-02-28 ENCOUNTER — HOSPITAL ENCOUNTER (EMERGENCY)
Facility: HOSPITAL | Age: 81
End: 2021-02-28
Attending: EMERGENCY MEDICINE
Payer: COMMERCIAL

## 2021-02-28 ENCOUNTER — HOSPITAL ENCOUNTER (INPATIENT)
Facility: HOSPITAL | Age: 81
LOS: 3 days | Discharge: HOME | DRG: 246 | End: 2021-03-03
Attending: STUDENT IN AN ORGANIZED HEALTH CARE EDUCATION/TRAINING PROGRAM | Admitting: INTERNAL MEDICINE
Payer: COMMERCIAL

## 2021-02-28 VITALS
OXYGEN SATURATION: 96 % | BODY MASS INDEX: 25.75 KG/M2 | DIASTOLIC BLOOD PRESSURE: 76 MMHG | SYSTOLIC BLOOD PRESSURE: 123 MMHG | HEIGHT: 70 IN | TEMPERATURE: 97.4 F | HEART RATE: 78 BPM | WEIGHT: 179.9 LBS | RESPIRATION RATE: 20 BRPM

## 2021-02-28 DIAGNOSIS — F17.290: ICD-10-CM

## 2021-02-28 DIAGNOSIS — I21.4 NSTEMI (NON-ST ELEVATED MYOCARDIAL INFARCTION) (HCC): Primary | ICD-10-CM

## 2021-02-28 DIAGNOSIS — R06.02 SOB (SHORTNESS OF BREATH): ICD-10-CM

## 2021-02-28 DIAGNOSIS — E78.5 HYPERLIPIDEMIA, UNSPECIFIED HYPERLIPIDEMIA TYPE: ICD-10-CM

## 2021-02-28 DIAGNOSIS — R05.9 COUGH: ICD-10-CM

## 2021-02-28 DIAGNOSIS — I50.9 CHF (CONGESTIVE HEART FAILURE) (HCC): ICD-10-CM

## 2021-02-28 DIAGNOSIS — R07.9 CHEST PAIN: ICD-10-CM

## 2021-02-28 PROBLEM — R74.01 TRANSAMINITIS: Status: ACTIVE | Noted: 2021-02-28

## 2021-02-28 LAB
ALBUMIN SERPL BCP-MCNC: 3.4 G/DL (ref 3.5–5)
ALP SERPL-CCNC: 120 U/L (ref 46–116)
ALT SERPL W P-5'-P-CCNC: 61 U/L (ref 12–78)
ANION GAP SERPL CALCULATED.3IONS-SCNC: 12 MMOL/L (ref 4–13)
APTT PPP: 27 SECONDS (ref 23–37)
APTT PPP: 52 SECONDS (ref 23–37)
APTT PPP: 64 SECONDS (ref 23–37)
APTT PPP: 74 SECONDS (ref 23–37)
AST SERPL W P-5'-P-CCNC: 83 U/L (ref 5–45)
ATRIAL RATE: 80 BPM
ATRIAL RATE: 84 BPM
ATRIAL RATE: 86 BPM
ATRIAL RATE: 89 BPM
ATRIAL RATE: 96 BPM
BASOPHILS # BLD AUTO: 0.03 THOUSANDS/ΜL (ref 0–0.1)
BASOPHILS NFR BLD AUTO: 0 % (ref 0–1)
BILIRUB SERPL-MCNC: 1 MG/DL (ref 0.2–1)
BILIRUB UR QL STRIP: NEGATIVE
BUN SERPL-MCNC: 25 MG/DL (ref 5–25)
CALCIUM ALBUM COR SERPL-MCNC: 9.5 MG/DL (ref 8.3–10.1)
CALCIUM SERPL-MCNC: 9 MG/DL (ref 8.3–10.1)
CHLORIDE SERPL-SCNC: 101 MMOL/L (ref 100–108)
CHOLEST SERPL-MCNC: 141 MG/DL (ref 50–200)
CK MB SERPL-MCNC: 31 NG/ML (ref 0–5)
CK MB SERPL-MCNC: 7.3 % (ref 0–2.5)
CK SERPL-CCNC: 426 U/L (ref 39–308)
CLARITY UR: CLEAR
CO2 SERPL-SCNC: 24 MMOL/L (ref 21–32)
COLOR UR: ABNORMAL
CREAT SERPL-MCNC: 1.16 MG/DL (ref 0.6–1.3)
EOSINOPHIL # BLD AUTO: 0.09 THOUSAND/ΜL (ref 0–0.61)
EOSINOPHIL NFR BLD AUTO: 1 % (ref 0–6)
ERYTHROCYTE [DISTWIDTH] IN BLOOD BY AUTOMATED COUNT: 14.1 % (ref 11.6–15.1)
EST. AVERAGE GLUCOSE BLD GHB EST-MCNC: 169 MG/DL
FLUAV RNA RESP QL NAA+PROBE: NEGATIVE
FLUBV RNA RESP QL NAA+PROBE: NEGATIVE
GFR SERPL CREATININE-BSD FRML MDRD: 60 ML/MIN/1.73SQ M
GLUCOSE SERPL-MCNC: 107 MG/DL (ref 65–140)
GLUCOSE SERPL-MCNC: 115 MG/DL (ref 65–140)
GLUCOSE SERPL-MCNC: 117 MG/DL (ref 65–140)
GLUCOSE SERPL-MCNC: 130 MG/DL (ref 65–140)
GLUCOSE SERPL-MCNC: 158 MG/DL (ref 65–140)
GLUCOSE SERPL-MCNC: 224 MG/DL (ref 65–140)
GLUCOSE UR STRIP-MCNC: NEGATIVE MG/DL
HBA1C MFR BLD: 7.5 %
HCT VFR BLD AUTO: 44 % (ref 36.5–49.3)
HDLC SERPL-MCNC: 49 MG/DL
HGB BLD-MCNC: 13.7 G/DL (ref 12–17)
HGB UR QL STRIP.AUTO: NEGATIVE
IMM GRANULOCYTES # BLD AUTO: 0.02 THOUSAND/UL (ref 0–0.2)
IMM GRANULOCYTES NFR BLD AUTO: 0 % (ref 0–2)
INR PPP: 1.17 (ref 0.84–1.19)
KETONES UR STRIP-MCNC: ABNORMAL MG/DL
LDLC SERPL CALC-MCNC: 79 MG/DL (ref 0–100)
LEUKOCYTE ESTERASE UR QL STRIP: NEGATIVE
LYMPHOCYTES # BLD AUTO: 0.95 THOUSANDS/ΜL (ref 0.6–4.47)
LYMPHOCYTES NFR BLD AUTO: 13 % (ref 14–44)
MCH RBC QN AUTO: 31.5 PG (ref 26.8–34.3)
MCHC RBC AUTO-ENTMCNC: 31.1 G/DL (ref 31.4–37.4)
MCV RBC AUTO: 101 FL (ref 82–98)
MONOCYTES # BLD AUTO: 0.77 THOUSAND/ΜL (ref 0.17–1.22)
MONOCYTES NFR BLD AUTO: 11 % (ref 4–12)
NEUTROPHILS # BLD AUTO: 5.29 THOUSANDS/ΜL (ref 1.85–7.62)
NEUTS SEG NFR BLD AUTO: 75 % (ref 43–75)
NITRITE UR QL STRIP: NEGATIVE
NONHDLC SERPL-MCNC: 92 MG/DL
NT-PROBNP SERPL-MCNC: 5602 PG/ML
P AXIS: 20 DEGREES
P AXIS: 53 DEGREES
P AXIS: 54 DEGREES
P AXIS: 62 DEGREES
P AXIS: 66 DEGREES
PH UR STRIP.AUTO: 5.5 [PH]
PLATELET # BLD AUTO: 231 THOUSANDS/UL (ref 149–390)
PMV BLD AUTO: 10.6 FL (ref 8.9–12.7)
POTASSIUM SERPL-SCNC: 4.5 MMOL/L (ref 3.5–5.3)
PR INTERVAL: 136 MS
PR INTERVAL: 138 MS
PR INTERVAL: 140 MS
PR INTERVAL: 144 MS
PR INTERVAL: 144 MS
PROT SERPL-MCNC: 7.9 G/DL (ref 6.4–8.2)
PROT UR STRIP-MCNC: NEGATIVE MG/DL
PROTHROMBIN TIME: 14.9 SECONDS (ref 11.6–14.5)
QRS AXIS: -14 DEGREES
QRS AXIS: -5 DEGREES
QRS AXIS: 46 DEGREES
QRS AXIS: 65 DEGREES
QRS AXIS: 8 DEGREES
QRSD INTERVAL: 114 MS
QRSD INTERVAL: 118 MS
QRSD INTERVAL: 120 MS
QRSD INTERVAL: 120 MS
QRSD INTERVAL: 124 MS
QT INTERVAL: 366 MS
QT INTERVAL: 380 MS
QT INTERVAL: 388 MS
QTC INTERVAL: 447 MS
QTC INTERVAL: 449 MS
QTC INTERVAL: 454 MS
QTC INTERVAL: 462 MS
QTC INTERVAL: 462 MS
RBC # BLD AUTO: 4.35 MILLION/UL (ref 3.88–5.62)
RSV RNA RESP QL NAA+PROBE: NEGATIVE
SARS-COV-2 RNA RESP QL NAA+PROBE: NEGATIVE
SODIUM SERPL-SCNC: 137 MMOL/L (ref 136–145)
SP GR UR STRIP.AUTO: 1.02 (ref 1–1.03)
T WAVE AXIS: 24 DEGREES
T WAVE AXIS: 47 DEGREES
T WAVE AXIS: 50 DEGREES
T WAVE AXIS: 50 DEGREES
T WAVE AXIS: 53 DEGREES
TRIGL SERPL-MCNC: 64 MG/DL
TROPONIN I SERPL-MCNC: 31.55 NG/ML
TROPONIN I SERPL-MCNC: 33.98 NG/ML
TROPONIN I SERPL-MCNC: 38.91 NG/ML
TROPONIN I SERPL-MCNC: 38.97 NG/ML
TROPONIN I SERPL-MCNC: >40 NG/ML
TSH SERPL DL<=0.05 MIU/L-ACNC: 1.59 UIU/ML (ref 0.36–3.74)
UROBILINOGEN UR QL STRIP.AUTO: 0.2 E.U./DL
VENTRICULAR RATE: 80 BPM
VENTRICULAR RATE: 84 BPM
VENTRICULAR RATE: 86 BPM
VENTRICULAR RATE: 89 BPM
VENTRICULAR RATE: 96 BPM
WBC # BLD AUTO: 7.15 THOUSAND/UL (ref 4.31–10.16)

## 2021-02-28 PROCEDURE — 82550 ASSAY OF CK (CPK): CPT | Performed by: EMERGENCY MEDICINE

## 2021-02-28 PROCEDURE — 82948 REAGENT STRIP/BLOOD GLUCOSE: CPT

## 2021-02-28 PROCEDURE — 85610 PROTHROMBIN TIME: CPT | Performed by: EMERGENCY MEDICINE

## 2021-02-28 PROCEDURE — 84484 ASSAY OF TROPONIN QUANT: CPT | Performed by: STUDENT IN AN ORGANIZED HEALTH CARE EDUCATION/TRAINING PROGRAM

## 2021-02-28 PROCEDURE — 93005 ELECTROCARDIOGRAM TRACING: CPT

## 2021-02-28 PROCEDURE — 85730 THROMBOPLASTIN TIME PARTIAL: CPT | Performed by: STUDENT IN AN ORGANIZED HEALTH CARE EDUCATION/TRAINING PROGRAM

## 2021-02-28 PROCEDURE — 96375 TX/PRO/DX INJ NEW DRUG ADDON: CPT

## 2021-02-28 PROCEDURE — 85730 THROMBOPLASTIN TIME PARTIAL: CPT | Performed by: EMERGENCY MEDICINE

## 2021-02-28 PROCEDURE — 83880 ASSAY OF NATRIURETIC PEPTIDE: CPT | Performed by: EMERGENCY MEDICINE

## 2021-02-28 PROCEDURE — 85730 THROMBOPLASTIN TIME PARTIAL: CPT | Performed by: INTERNAL MEDICINE

## 2021-02-28 PROCEDURE — 93010 ELECTROCARDIOGRAM REPORT: CPT

## 2021-02-28 PROCEDURE — 83036 HEMOGLOBIN GLYCOSYLATED A1C: CPT | Performed by: INTERNAL MEDICINE

## 2021-02-28 PROCEDURE — 99285 EMERGENCY DEPT VISIT HI MDM: CPT | Performed by: EMERGENCY MEDICINE

## 2021-02-28 PROCEDURE — 96365 THER/PROPH/DIAG IV INF INIT: CPT

## 2021-02-28 PROCEDURE — 93010 ELECTROCARDIOGRAM REPORT: CPT | Performed by: INTERNAL MEDICINE

## 2021-02-28 PROCEDURE — 80053 COMPREHEN METABOLIC PANEL: CPT | Performed by: EMERGENCY MEDICINE

## 2021-02-28 PROCEDURE — 36415 COLL VENOUS BLD VENIPUNCTURE: CPT | Performed by: EMERGENCY MEDICINE

## 2021-02-28 PROCEDURE — 84443 ASSAY THYROID STIM HORMONE: CPT | Performed by: INTERNAL MEDICINE

## 2021-02-28 PROCEDURE — 96366 THER/PROPH/DIAG IV INF ADDON: CPT

## 2021-02-28 PROCEDURE — 71045 X-RAY EXAM CHEST 1 VIEW: CPT

## 2021-02-28 PROCEDURE — 84484 ASSAY OF TROPONIN QUANT: CPT | Performed by: INTERNAL MEDICINE

## 2021-02-28 PROCEDURE — 82553 CREATINE MB FRACTION: CPT | Performed by: EMERGENCY MEDICINE

## 2021-02-28 PROCEDURE — 99222 1ST HOSP IP/OBS MODERATE 55: CPT | Performed by: INTERNAL MEDICINE

## 2021-02-28 PROCEDURE — 85025 COMPLETE CBC W/AUTO DIFF WBC: CPT | Performed by: EMERGENCY MEDICINE

## 2021-02-28 PROCEDURE — 70450 CT HEAD/BRAIN W/O DYE: CPT

## 2021-02-28 PROCEDURE — 0241U HB NFCT DS VIR RESP RNA 4 TRGT: CPT | Performed by: EMERGENCY MEDICINE

## 2021-02-28 PROCEDURE — 84484 ASSAY OF TROPONIN QUANT: CPT | Performed by: EMERGENCY MEDICINE

## 2021-02-28 PROCEDURE — 81003 URINALYSIS AUTO W/O SCOPE: CPT | Performed by: EMERGENCY MEDICINE

## 2021-02-28 PROCEDURE — 80061 LIPID PANEL: CPT | Performed by: INTERNAL MEDICINE

## 2021-02-28 PROCEDURE — 99223 1ST HOSP IP/OBS HIGH 75: CPT | Performed by: INTERNAL MEDICINE

## 2021-02-28 PROCEDURE — 86803 HEPATITIS C AB TEST: CPT | Performed by: INTERNAL MEDICINE

## 2021-02-28 PROCEDURE — 99285 EMERGENCY DEPT VISIT HI MDM: CPT

## 2021-02-28 RX ORDER — HEPARIN SODIUM 10000 [USP'U]/100ML
3-20 INJECTION, SOLUTION INTRAVENOUS
Status: DISCONTINUED | OUTPATIENT
Start: 2021-02-28 | End: 2021-02-28 | Stop reason: HOSPADM

## 2021-02-28 RX ORDER — HEPARIN SODIUM 10000 [USP'U]/100ML
3-20 INJECTION, SOLUTION INTRAVENOUS
Status: DISCONTINUED | OUTPATIENT
Start: 2021-02-28 | End: 2021-03-01

## 2021-02-28 RX ORDER — INSULIN ASPART 100 [IU]/ML
15 INJECTION, SUSPENSION SUBCUTANEOUS
Status: DISCONTINUED | OUTPATIENT
Start: 2021-02-28 | End: 2021-03-02

## 2021-02-28 RX ORDER — HEPARIN SODIUM 1000 [USP'U]/ML
4000 INJECTION, SOLUTION INTRAVENOUS; SUBCUTANEOUS ONCE
Status: COMPLETED | OUTPATIENT
Start: 2021-02-28 | End: 2021-02-28

## 2021-02-28 RX ORDER — HEPARIN SODIUM 1000 [USP'U]/ML
4000 INJECTION, SOLUTION INTRAVENOUS; SUBCUTANEOUS
Status: DISCONTINUED | OUTPATIENT
Start: 2021-02-28 | End: 2021-03-01

## 2021-02-28 RX ORDER — ASPIRIN 81 MG/1
324 TABLET, CHEWABLE ORAL ONCE
Status: COMPLETED | OUTPATIENT
Start: 2021-02-28 | End: 2021-02-28

## 2021-02-28 RX ORDER — ACETAMINOPHEN 325 MG/1
650 TABLET ORAL EVERY 6 HOURS PRN
Status: DISCONTINUED | OUTPATIENT
Start: 2021-02-28 | End: 2021-03-03 | Stop reason: HOSPADM

## 2021-02-28 RX ORDER — FUROSEMIDE 10 MG/ML
40 INJECTION INTRAMUSCULAR; INTRAVENOUS ONCE
Status: COMPLETED | OUTPATIENT
Start: 2021-02-28 | End: 2021-02-28

## 2021-02-28 RX ORDER — NITROGLYCERIN 0.4 MG/1
0.4 TABLET SUBLINGUAL
Status: DISCONTINUED | OUTPATIENT
Start: 2021-02-28 | End: 2021-03-03 | Stop reason: HOSPADM

## 2021-02-28 RX ORDER — MELATONIN
1000 DAILY
Status: DISCONTINUED | OUTPATIENT
Start: 2021-02-28 | End: 2021-03-03 | Stop reason: HOSPADM

## 2021-02-28 RX ORDER — HEPARIN SODIUM 1000 [USP'U]/ML
2000 INJECTION, SOLUTION INTRAVENOUS; SUBCUTANEOUS
Status: DISCONTINUED | OUTPATIENT
Start: 2021-02-28 | End: 2021-02-28 | Stop reason: HOSPADM

## 2021-02-28 RX ORDER — OXYCODONE HYDROCHLORIDE 5 MG/1
5 TABLET ORAL EVERY 4 HOURS PRN
Status: DISCONTINUED | OUTPATIENT
Start: 2021-02-28 | End: 2021-03-03 | Stop reason: HOSPADM

## 2021-02-28 RX ORDER — ONDANSETRON 2 MG/ML
4 INJECTION INTRAMUSCULAR; INTRAVENOUS EVERY 6 HOURS PRN
Status: DISCONTINUED | OUTPATIENT
Start: 2021-02-28 | End: 2021-03-03 | Stop reason: HOSPADM

## 2021-02-28 RX ORDER — HEPARIN SODIUM 1000 [USP'U]/ML
4000 INJECTION, SOLUTION INTRAVENOUS; SUBCUTANEOUS
Status: DISCONTINUED | OUTPATIENT
Start: 2021-02-28 | End: 2021-02-28 | Stop reason: HOSPADM

## 2021-02-28 RX ORDER — HEPARIN SODIUM 1000 [USP'U]/ML
2000 INJECTION, SOLUTION INTRAVENOUS; SUBCUTANEOUS
Status: DISCONTINUED | OUTPATIENT
Start: 2021-02-28 | End: 2021-03-01

## 2021-02-28 RX ORDER — ASPIRIN 81 MG/1
81 TABLET, CHEWABLE ORAL DAILY
Status: DISCONTINUED | OUTPATIENT
Start: 2021-03-01 | End: 2021-03-03 | Stop reason: HOSPADM

## 2021-02-28 RX ADMIN — NICOTINE 7 MG/24 HR DAILY TRANSDERMAL PATCH 1 PATCH: at 11:55

## 2021-02-28 RX ADMIN — ONDANSETRON 4 MG: 2 INJECTION INTRAMUSCULAR; INTRAVENOUS at 18:04

## 2021-02-28 RX ADMIN — HEPARIN SODIUM 4000 UNITS: 1000 INJECTION INTRAVENOUS; SUBCUTANEOUS at 01:53

## 2021-02-28 RX ADMIN — CYANOCOBALAMIN TAB 500 MCG 1000 MCG: 500 TAB at 11:55

## 2021-02-28 RX ADMIN — INSULIN ASPART 15 UNITS: 100 INJECTION, SUSPENSION SUBCUTANEOUS at 17:13

## 2021-02-28 RX ADMIN — Medication 1000 UNITS: at 11:55

## 2021-02-28 RX ADMIN — INSULIN LISPRO 2 UNITS: 100 INJECTION, SOLUTION INTRAVENOUS; SUBCUTANEOUS at 17:13

## 2021-02-28 RX ADMIN — ASPIRIN 324 MG: 81 TABLET, CHEWABLE ORAL at 01:52

## 2021-02-28 RX ADMIN — INSULIN LISPRO 1 UNITS: 100 INJECTION, SOLUTION INTRAVENOUS; SUBCUTANEOUS at 21:06

## 2021-02-28 RX ADMIN — HEPARIN SODIUM 2000 UNITS: 1000 INJECTION INTRAVENOUS; SUBCUTANEOUS at 10:03

## 2021-02-28 RX ADMIN — FUROSEMIDE 40 MG: 10 INJECTION, SOLUTION INTRAMUSCULAR; INTRAVENOUS at 01:34

## 2021-02-28 RX ADMIN — HEPARIN SODIUM 14 UNITS/KG/HR: 10000 INJECTION, SOLUTION INTRAVENOUS at 10:04

## 2021-02-28 RX ADMIN — HEPARIN SODIUM 12 UNITS/KG/HR: 10000 INJECTION, SOLUTION INTRAVENOUS at 01:54

## 2021-02-28 NOTE — ED TRIAGE NOTES
Pt presents to ED via EMS on stretcher  EMS states Pt did not show up to work today, so coworker called police to check on pt  Per EMS pt , pt AA&Ox4, increased dyspnea on exertion  Pt states he fell x1 week ago because he was dizzy  Pt c/o of being dizzy currently and increased SOB

## 2021-02-28 NOTE — EMTALA/ACUTE CARE TRANSFER
[facility] EMERGENCY DEPARTMENT  [address]  [facility] [address]  Dept: [phone]      EMTALA TRANSFER CONSENT    NAME Dallas Hein                                         1940                              MRN [id number]    I have been informed of my rights regarding examination, treatment, and transfer   by Dr Jarett Wiley,     Benefits:      Risks:        Consent for Transfer:  I acknowledge that my medical condition has been evaluated and explained to me by the emergency department physician or other qualified medical person and/or my attending physician, who has recommended that I be transferred to the service of    at    The above potential benefits of such transfer, the potential risks associated with such transfer, and the probable risks of not being transferred have been explained to me, and I fully understand them  The doctor has explained that, in my case, the benefits of transfer outweigh the risks  I agree to be transferred  I authorize the performance of emergency medical procedures and treatments upon me in both transit and upon arrival at the receiving facility  Additionally, I authorize the release of any and all medical records to the receiving facility and request they be transported with me, if possible  I understand that the safest mode of transportation during a medical emergency is an ambulance and that the Hospital advocates the use of this mode of transport  Risks of traveling to the receiving facility by car, including absence of medical control, life sustaining equipment, such as oxygen, and medical personnel has been explained to me and I fully understand them  (RAOUL CORRECT BOX BELOW)  [ x ]  I consent to the stated transfer and to be transported by ambulance/helicopter  [  ]  I consent to the stated transfer, but refuse transportation by ambulance and accept full responsibility for my transportation by car    I understand the risks of non-ambulance transfers and I exonerate the Hospital and its staff from any deterioration in my condition that results from this refusal     X___________________________________________    DATE  21  TIME________  Signature of patient or legally responsible individual signing on patient behalf           RELATIONSHIP TO PATIENT_________________________          Provider Certification    NAME [id number] [facility]                                         1940                              MRN [id number]    A medical screening exam was performed on the above named patient  Based on the examination:    Condition Necessitating Transfer The primary encounter diagnosis was NSTEMI (non-ST elevated myocardial infarction) ([facility] [address] )  Diagnoses of [facility]congestive heart failure) [facility] ), Chest pain, SOB (shortness of breath), and Cough were also pertinent to this visit  Patient Condition:      Reason for Transfer:      Transfer Requirements: Facility     · Space available and qualified personnel available for treatment as acknowledged by    · Agreed to accept transfer and to provide appropriate medical treatment as acknowledged by          · Appropriate medical records of the examination and treatment of the patient are provided at the time of transfer   [address] [facility], [address] _______  · Transfer will be performed by qualified personnel from    and appropriate transfer equipment as required, including the use of necessary and appropriate life support measures      Provider Certification: I have examined the patient and explained the following risks and benefits of being transferred/refusing transfer to the patient/family:         Based on these reasonable risks and benefits to the patient and/or the unborn child(imelda), and based upon the information available at the time of the patients examination, I certify that the medical benefits reasonably to be expected from the provision of appropriate medical treatments at another medical facility outweigh the increasing risks, if any, to the individuals medical condition, and in the case of labor to the unborn child, from effecting the transfer      X____________________________________________ DATE 02/28/21        TIME_______      ORIGINAL - SEND TO MEDICAL RECORDS   COPY - SEND WITH PATIENT DURING TRANSFER

## 2021-02-28 NOTE — H&P
Internal Medicine  H&P- Kellenlexii Tsai 1940, 68 y o  male MRN: [id number]  Unit/Bed#: E4 -01 Encounter: [id number]  Primary Care Provider: Kristyn Valera MD   Date and time admitted to hospital: 2/28/2021  7:35 AM        Assessment and Plan  * NSTEMI (non-ST elevated myocardial infarction) [facility])  Assessment & Plan  · NSTEMI question secondary to viral syndrome given debility for over a week vs ACS  · Seen by cardiology  Continue heparin infusion  Monitor on telemetry and trend cardiac enzymes  · Check echocardiogram given abnormal CXR and concern for cardiomyopathy    Results from last 7 days   Lab Units 02/28/21  0821 02/28/21  0405 02/28/21  0106   TROPONIN I ng/mL 38 91* 38 97* >40 00*       Hyperglycemia due to type 2 diabetes mellitus [facility])  Assessment & Plan  Lab Results   Component Value Date    HGBA1C 11 4 (H) 06/19/2018     Recent Labs     02/28/21  0101 02/28/21  0752 02/28/21  1132   POCGLU 107 130 115     · Diabetes mellitus on 70/30, actos and glipizide  No longer metformin  · Will continue 70/30 insulin with sliding scale  Transaminitis  Assessment & Plan  · Transaminitis may be secondary to fatty liver or viral syndrome  Will check hepatitis-C  Results from last 7 days   Lab Units 02/28/21  0106   AST U/L 83*   ALT U/L 61   TOTAL BILIRUBIN mg/dL 1 00       Smokes a pipe  Assessment & Plan  · Patient smokes a pipe  Will be prescribed nicotine patch during hospitalization    Hypertension  Assessment & Plan  · History of hypertension not on medications    Hyperlipidemia  Assessment & Plan  · Hyperlipidemia  Will check lipid profile    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  Assessment & Plan  · Osteoporosis on fosamax weekly with ergocalciferol      VTE Prophylaxis: Heparin Drip  Code Status: Level 1 - Full Code  Anticipated Length of Stay:  Patient will be admitted on an Inpatient basis with an anticipated length of stay of  greater than 2 midnights       Justification for Hospital Stay: NSTEMI (non-ST elevated myocardial infarction) ([facility] [address] )  Total Time for Visit, including Counseling / Coordination of Care: x mins  Greater than 50% of this total time spent on direct patient counseling and coordination of care  Chief Complaint:     Gisselle Pena found to have NSTEMI    History of Present Illness:    Lubna Montiel is a 68 y o  male with a past medical history of diabetes mellitus and osteoporosis who presents with debility  He still works part-time at [employer] at night between 10:00 p m  and 7:00 a m  at [employer]  He last worked a week ago and he does only part-time  He was supposed to show up for work last night but when he didn't, management called police for a safety check and found that he was in bed and completely debilitated  He states that he has been feeling lousy for over a week with chest discomfort, mild without radiation  His shortness of breath is without cough but he denies any sick contacts as he lives alone  He denies any vomiting or diarrhea  Upon presentation to the emergency department he was found have NSTEMI    Review of Systems:  Review of Systems   Constitutional: Positive for fatigue  Negative for chills, diaphoresis and fever  HENT: Negative for dental problem and facial swelling  Eyes: Negative for photophobia, pain and visual disturbance  Respiratory: Positive for shortness of breath  Negative for wheezing and stridor  Cardiovascular: Positive for chest pain  Negative for palpitations  Gastrointestinal: Negative for abdominal distention, abdominal pain, diarrhea, nausea and vomiting  Genitourinary: Negative for dysuria, hematuria and urgency  Musculoskeletal: Negative for back pain and myalgias  Skin: Negative for rash  Neurological: Positive for weakness  Negative for dizziness, seizures, speech difficulty, numbness and headaches  Psychiatric/Behavioral: Negative for agitation and suicidal ideas  The patient is not nervous/anxious      All other systems reviewed and are negative  Past Medical and Surgical History:   Past Medical History:   Diagnosis Date    Arthritis     Diabetes mellitus ([facility] [address] )     Hyperlipidemia     Osteoporosis      Past Surgical History:   Procedure Laterality Date    TOTAL HIP ARTHROPLASTY       Meds/Allergies: Allergies: Allergies   Allergen Reactions    Metformin Diarrhea     Prior to Admission Medications   Prescriptions Last Dose Informant Patient Reported? Taking?    alendronate (FOSAMAX) 70 mg tablet  Self Yes No   Sig: once a week Takes on saturdays   aspirin 81 MG tablet  Self Yes No   Sig: Take 81 mg by mouth daily    cholecalciferol (VITAMIN D3) 1,000 units tablet  Self Yes No   Sig: Take by mouth daily    cyanocobalamin (VITAMIN B-12) 1,000 mcg tablet  Self Yes No   Sig: Take 1,000 mcg by mouth daily    ergocalciferol (VITAMIN D2) 50,000 units  Self Yes No   Sig: Take 50,000 Units by mouth once a week wednesdays   glipiZIDE (GLIPIZIDE XL) 10 mg 24 hr tablet  Self Yes No   Sig: Take 10 mg by mouth daily    insulin NPH-insulin regular (NOVOLIN 70/30 RELION) 100 units/mL subcutaneous injection  Self Yes No   Sig: Inject 15 Units under the skin 2 (two) times a day     pioglitazone (ACTOS) 45 mg tablet  Self Yes No   Sig: Take 45 mg by mouth daily       Facility-Administered Medications: None     Social History:     Social History     Socioeconomic History    Marital status: Single     Spouse name: Not on file    Number of children: Not on file    Years of education: Not on file    Highest education level: Not on file   Occupational History    Not on file   Social Needs    Financial resource strain: Not on file    Food insecurity     Worry: Not on file     Inability: Not on file    Transportation needs     Medical: Not on file     Non-medical: Not on file   Tobacco Use    Smoking status: Current Every Day Smoker     Types: Pipe    Smokeless tobacco: Never Used   Substance and Sexual Activity    Alcohol use: Yes     Frequency: 4 or more times a week     Comment: pt states he usually has a small glass of wine most evenings    Drug use: No    Sexual activity: Not on file   Lifestyle    Physical activity     Days per week: Not on file     Minutes per session: Not on file    Stress: Not on file   Relationships    Social connections     Talks on phone: Not on file     Gets together: Not on file     Attends Sabianist service: Not on file     Active member of club or organization: Not on file     Attends meetings of clubs or organizations: Not on file     Relationship status: Not on file    Intimate partner violence     Fear of current or ex partner: Not on file     Emotionally abused: Not on file     Physically abused: Not on file     Forced sexual activity: Not on file   Other Topics Concern    Not on file   Social History Narrative    Not on file     Patient Pre-hospital Living Situation:  Lives alone  Patient Pre-hospital Level of Mobility:   Patient Pre-hospital Diet Restrictions:     Family History:  Family History   Problem Relation Age of Onset    No Known Problems Mother     Arthritis Father     Diabetes unspecified Brother      Physical Exam:   Vitals:   Blood Pressure: 120/66 (02/28/21 1100)  Pulse: 78 (02/28/21 1100)  Temperature: 98 4 °F (36 9 °C) (02/28/21 1100)  Temp Source: Tympanic (02/28/21 1100)  Respirations: 20 (02/28/21 1100)  SpO2: 99 % (02/28/21 1100)    Physical Exam  Vitals signs reviewed  Constitutional:       General: He is not in acute distress  HENT:      Head: Atraumatic  Mouth/Throat:      Mouth: Mucous membranes are dry  Pharynx: Oropharynx is clear  Eyes:      Extraocular Movements: Extraocular movements intact  Neck:      Musculoskeletal: Neck supple  Cardiovascular:      Rate and Rhythm: Regular rhythm  Heart sounds: Normal heart sounds  Pulmonary:      Effort: Pulmonary effort is normal       Breath sounds: Decreased breath sounds present  No wheezing  Abdominal:      General: Bowel sounds are normal       Palpations: Abdomen is soft  Tenderness: There is no abdominal tenderness  There is no rebound  Musculoskeletal:         General: No swelling or tenderness  Skin:     General: Skin is warm and dry  Neurological:      General: No focal deficit present  Mental Status: He is alert and oriented to person, place, and time  Cranial Nerves: No cranial nerve deficit  Psychiatric:         Mood and Affect: Mood normal        Lab Results: I have personally reviewed pertinent reports      Results from last 7 days   Lab Units 02/28/21  0106   WBC Thousand/uL 7 15   HEMOGLOBIN g/dL 13 7   HEMATOCRIT % 44 0   PLATELETS Thousands/uL 231   NEUTROS PCT % 75   LYMPHS PCT % 13*   MONOS PCT % 11   EOS PCT % 1     Results from last 7 days   Lab Units 02/28/21  0106   SODIUM mmol/L 137   POTASSIUM mmol/L 4 5   CHLORIDE mmol/L 101   CO2 mmol/L 24   ANION GAP mmol/L 12   BUN mg/dL 25   CREATININE mg/dL 1 16   CALCIUM mg/dL 9 0   ALBUMIN g/dL 3 4*   TOTAL BILIRUBIN mg/dL 1 00   ALK PHOS U/L 120*   ALT U/L 61   AST U/L 83*   EGFR ml/min/1 73sq m 60   GLUCOSE RANDOM mg/dL 117     Results from last 7 days   Lab Units 02/28/21  0106   INR  1 17     Results from last 7 days   Lab Units 02/28/21  0821 02/28/21  0405 02/28/21  0106   CK TOTAL U/L  --   --  426*   TROPONIN I ng/mL 38 91* 38 97* >40 00*   CK MB INDEX %  --   --  7 3*             Results from last 7 days   Lab Units 02/28/21  0106   NT-PRO BNP pg/mL 5,602*      Results from last 7 days   Lab Units 02/28/21  0215   COLOR UA  Straw   CLARITY UA  Clear   SPEC GRAV UA  1 020   PH UA  5 5   LEUKOCYTES UA  Negative   NITRITE UA  Negative   GLUCOSE UA mg/dl Negative   KETONES UA mg/dl 15 (1+)*   BILIRUBIN UA  Negative   BLOOD UA  Negative           Results from last 7 days   Lab Units 02/28/21  0110   INFLUENZA A PCR  Negative   INFLUENZA B PCR  Negative   RSV PCR  Negative     Imaging: I have personally reviewed pertinent films in PACS  Xr Chest 1 View Portable    Result Date: 2/28/2021  Impression: Nonspecific bilateral lung opacities, possibly pulmonary edema and/or multifocal pneumonia  Workstation performed: [id number]     Ct Head Without Contrast    Result Date: 2/28/2021  Impression: No acute intracranial abnormality  Workstation performed: [id number]       EKG, Pathology, and Other Studies Reviewed on Admission:   EKG  Result Date: 02/28/21  Personally reviewed strips with impression of:  Normal sinus rhythm 84 bpm    Allscripts/ Epic Records Reviewed: Yes    ** Please Note: This note has been constructed using a voice recognition system   **

## 2021-02-28 NOTE — ED NOTES
Pt requested for his brother Elva Morgan to be updated  Pt's brother Elva Eddie called and updated on patient  Elva Morgan also notified of visiting hours at [address]     [facility] Number: [phone]     Tyra Garcia RN  02/28/21 8628

## 2021-02-28 NOTE — ASSESSMENT & PLAN NOTE
· Transaminitis may be secondary to fatty liver or viral syndrome  Will check hepatitis-C      Results from last 7 days   Lab Units 02/28/21  0106   AST U/L 83*   ALT U/L 61   TOTAL BILIRUBIN mg/dL 1 00

## 2021-02-28 NOTE — ED NOTES
Transfer:   [address]  Accepting Dr Marie Laureano  Report: [phone]     Lalita Copoer RN  02/28/21 0781

## 2021-02-28 NOTE — ASSESSMENT & PLAN NOTE
Lab Results   Component Value Date    HGBA1C 11 4 (H) 06/19/2018     Recent Labs     02/28/21  0101 02/28/21  0752 02/28/21  1132   POCGLU 107 130 115     · Diabetes mellitus on 70/30, actos and glipizide  No longer metformin  · Will continue 70/30 insulin with sliding scale

## 2021-02-28 NOTE — ED PROVIDER NOTES
History  Chief Complaint   Patient presents with    Shortness of Breath     67 yo male presenting with SOB and central CP  Per pt for the past 2 weeks he has not felt well - offers vague complaints of waxing and waning body aches, central chest pain he describes as "strange", chills, SOB with productive cough - was bring up phlegm last week, no longer productive but sounds loose, dizziness  Pt has NOT had COVID and was last tested in December prior to eye surgery, has NOT been vaccinated and works nights at [employer]  Did not show up for his shift tonight and coworker called police to do welfare check  EMS was called as pt was weak and too dizzy to stand  , VSS  Denies weight gain or leg swelling  History provided by:  Patient and EMS personnel   used: No    Shortness of Breath  Severity:  Moderate  Onset quality:  Gradual  Duration:  2 weeks  Timing:  Intermittent  Progression:  Worsening  Chronicity:  New  Relieved by:  Nothing  Worsened by:  Exertion and movement  Ineffective treatments:  None tried  Associated symptoms: chest pain (central), cough and fever    Associated symptoms: no abdominal pain, no headaches, no neck pain, no rash, no sore throat, no vomiting and no wheezing    Chest pain:     Quality: dull      Severity:  Mild    Onset quality:  Gradual    Duration:  1 week    Timing:  Intermittent  Cough:     Cough characteristics:  Productive    Severity:  Moderate    Onset quality:  Gradual    Duration:  2 weeks    Timing:  Constant    Progression:  Improving  Fever:     Fever duration: "last week"    Temp source:  Subjective      Prior to Admission Medications   Prescriptions Last Dose Informant Patient Reported? Taking?    Insulin Syringe-Needle U-100 (RELI-ON INS SYR  5CC/29G) 29G 0 5 ML MISC  Self Yes No   Sig: by Does not apply route   Red Yeast Rice 600 MG CAPS  Self Yes No   Sig: every 24 hours   alendronate (FOSAMAX) 70 mg tablet  Self Yes No   Si tablet aspirin 81 MG tablet 2021 at Unknown time Self Yes Yes   Sig: every 24 hours   cholecalciferol (VITAMIN D3) 1,000 units tablet  Self Yes No   Sig: Take by mouth   clobetasol (TEMOVATE) 0 05 % cream  Self Yes No   Si application to affected area   cyanocobalamin (VITAMIN B-12) 1,000 mcg tablet  Self Yes No   Sig: every 24 hours   ergocalciferol (VITAMIN D2) 50,000 units  Self Yes No   Sig: take 1 capsule by mouth every week   glipiZIDE (GLIPIZIDE XL) 10 mg 24 hr tablet  Self Yes No   Si tablet   insulin NPH-insulin regular (NOVOLIN 70/30 RELION) 100 units/mL subcutaneous injection  Self Yes No   Sig: Inject 15 Units under the skin 2 (two) times a day     metFORMIN (GLUCOPHAGE-XR) 500 mg 24 hr tablet   No No   Sig: take 1 tablet by mouth twice a day   pioglitazone (ACTOS) 45 mg tablet  Self Yes No   Si mg daily        Facility-Administered Medications: None       Past Medical History:   Diagnosis Date    Arthritis     Diabetes mellitus ([facility] [address] )     Hyperlipidemia        Past Surgical History:   Procedure Laterality Date    TOTAL HIP ARTHROPLASTY         Family History   Problem Relation Age of Onset    No Known Problems Mother     Arthritis Father     Diabetes unspecified Brother      I have reviewed and agree with the history as documented  E-Cigarette/Vaping     E-Cigarette/Vaping Substances     Social History     Tobacco Use    Smoking status: Current Every Day Smoker     Types: Pipe    Smokeless tobacco: Never Used   Substance Use Topics    Alcohol use: No    Drug use: No       Review of Systems   Constitutional: Positive for chills, fatigue and fever  HENT: Negative for congestion and sore throat  Eyes: Negative for visual disturbance  Respiratory: Positive for cough and shortness of breath  Negative for chest tightness and wheezing  Cardiovascular: Positive for chest pain (central)  Negative for palpitations     Gastrointestinal: Negative for abdominal pain, diarrhea, nausea and vomiting  Genitourinary: Negative for dysuria  Musculoskeletal: Positive for myalgias  Negative for neck pain and neck stiffness  Skin: Negative for pallor and rash  Neurological: Negative for headaches  Psychiatric/Behavioral: Negative for confusion  All other systems reviewed and are negative  Physical Exam  Physical Exam  Vitals signs and nursing note reviewed  Constitutional:       General: He is not in acute distress  Appearance: He is well-developed  HENT:      Head: Normocephalic and atraumatic  Right Ear: External ear normal       Left Ear: External ear normal    Eyes:      Pupils: Pupils are equal, round, and reactive to light  Neck:      Musculoskeletal: Neck supple  Cardiovascular:      Rate and Rhythm: Normal rate and regular rhythm  Heart sounds: No murmur  Pulmonary:      Effort: Pulmonary effort is normal  Tachypnea (mild after moving over from stretcher while conversing with staff) present  Breath sounds: Examination of the right-upper field reveals wheezing  Examination of the right-middle field reveals wheezing  Examination of the right-lower field reveals wheezing and rales  Examination of the left-lower field reveals rales  Decreased breath sounds, wheezing and rales present  Abdominal:      General: Bowel sounds are normal  There is no distension  Palpations: Abdomen is soft  Tenderness: There is no abdominal tenderness  Musculoskeletal: Normal range of motion  Right lower leg: No edema  Left lower leg: No edema  Skin:     General: Skin is warm  Coloration: Skin is not pale  Findings: No rash  Neurological:      General: No focal deficit present  Mental Status: He is alert and oriented to person, place, and time  Cranial Nerves: No cranial nerve deficit     Psychiatric:         Behavior: Behavior normal          Vital Signs  ED Triage Vitals   Temperature Pulse Respirations Blood Pressure SpO2 02/28/21 0102 02/28/21 0102 02/28/21 0102 02/28/21 0102 02/28/21 0102   (!) 97 4 °F (36 3 °C) 97 16 119/84 97 %      Temp Source Heart Rate Source Patient Position - Orthostatic VS BP Location FiO2 (%)   02/28/21 0102 02/28/21 0102 02/28/21 0102 02/28/21 0102 --   Oral Monitor Lying Right arm       Pain Score       02/28/21 0400       No Pain           Vitals:    02/28/21 0430 02/28/21 0500 02/28/21 0530 02/28/21 0600   BP: 131/70 132/71 140/81 122/68   Pulse: 80 79 84 76   Patient Position - Orthostatic VS: Lying Lying Lying Lying         Visual Acuity      ED Medications  Medications   heparin (porcine) 25,000 units in 0 45% NaCl 250 mL infusion (premix) (12 Units/kg/hr × 80 kg (Order-Specific) Intravenous New Bag 2/28/21 0154)   heparin (porcine) injection 4,000 Units (has no administration in time range)   heparin (porcine) injection 2,000 Units (has no administration in time range)   furosemide (LASIX) injection 40 mg (40 mg Intravenous Given 2/28/21 0134)   aspirin chewable tablet 324 mg (324 mg Oral Given 2/28/21 0152)   heparin (porcine) injection 4,000 Units (4,000 Units Intravenous Given 2/28/21 0153)       Diagnostic Studies  Results Reviewed     Procedure Component Value Units Date/Time    Troponin I repeat in 3hrs [[id number]]  (Abnormal) Collected: 02/28/21 0405    Lab Status: Final result Specimen: Blood from Arm, Right Updated: 02/28/21 0441     Troponin I 38 97 ng/mL     COVID19, Influenza A/B, RSV PCR, [facility] [[id number]]  (Normal) Collected: 02/28/21 0110    Lab Status: Final result Specimen: Nares from Nasopharyngeal Swab Updated: 02/28/21 0251     SARS-CoV-2 Negative     INFLUENZA A PCR Negative     INFLUENZA B PCR Negative     RSV PCR Negative    Narrative: This test has been authorized by FDA under an EUA (Emergency Use Assay) for use by authorized laboratories    Clinical caution and judgement should be used with the interpretation of these results with consideration of the clinical impression and other laboratory testing  Testing reported as "Positive" or "Negative" has been proven to be accurate according to standard laboratory validation requirements  All testing is performed with control materials showing appropriate reactivity at standard intervals      UA w Reflex to Microscopic w Reflex to Culture [[id number]]  (Abnormal) Collected: 02/28/21 0215    Lab Status: Final result Specimen: Urine, Other Updated: 02/28/21 0246     Color, UA Straw     Clarity, UA Clear     Specific [place], UA 1 020     pH, UA 5 5     Leukocytes, UA Negative     Nitrite, UA Negative     Protein, UA Negative mg/dl      Glucose, UA Negative mg/dl      Ketones, UA 15 (1+) mg/dl      Urobilinogen, UA 0 2 E U /dl      Bilirubin, UA Negative     Blood, UA Negative    CKMB [[id number]]  (Abnormal) Collected: 02/28/21 0106    Lab Status: Final result Specimen: Blood from Arm, Left Updated: 02/28/21 0218     CK-MB Index 7 3 %      CK-MB 31 0 ng/mL     CK (with reflex to MB) [[id number]]  (Abnormal) Collected: 02/28/21 0106    Lab Status: Final result Specimen: Blood from Arm, Left Updated: 02/28/21 0218     Total  U/L     APTT six (6) hours after Heparin bolus/drip initiation or dosing change [[id number]]     Lab Status: No result Specimen: Blood     Troponin I [[id number]]  (Abnormal) Collected: 02/28/21 0106    Lab Status: Final result Specimen: Blood from Arm, Left Updated: 02/28/21 0141     Troponin I >40 00 ng/mL     NT-BNP PRO [[id number]]  (Abnormal) Collected: 02/28/21 0106    Lab Status: Final result Specimen: Blood from Arm, Left Updated: 02/28/21 0139     NT-proBNP 5,602 pg/mL     Comprehensive metabolic panel [[id number]]  (Abnormal) Collected: 02/28/21 0106    Lab Status: Final result Specimen: Blood from Arm, Left Updated: 02/28/21 0132     Sodium 137 mmol/L      Potassium 4 5 mmol/L      Chloride 101 mmol/L      CO2 24 mmol/L      ANION GAP 12 mmol/L      BUN 25 mg/dL      Creatinine 1 16 mg/dL      Glucose 117 mg/dL      Calcium 9 0 mg/dL      Corrected Calcium 9 5 mg/dL      AST 83 U/L      ALT 61 U/L      Alkaline Phosphatase 120 U/L      Total Protein 7 9 g/dL      Albumin 3 4 g/dL      Total Bilirubin 1 00 mg/dL      eGFR 60 ml/min/1 73sq m     Narrative:      National Kidney Disease Foundation guidelines for Chronic Kidney Disease (CKD):     Stage 1 with normal or high GFR (GFR > 90 mL/min/1 73 square meters)    Stage 2 Mild CKD (GFR = 60-89 mL/min/1 73 square meters)    Stage 3A Moderate CKD (GFR = 45-59 mL/min/1 73 square meters)    Stage 3B Moderate CKD (GFR = 30-44 mL/min/1 73 square meters)    Stage 4 Severe CKD (GFR = 15-29 mL/min/1 73 square meters)    Stage 5 End Stage CKD (GFR <15 mL/min/1 73 square meters)  Note: GFR calculation is accurate only with a steady state creatinine    Protime-INR [[id number]]  (Abnormal) Collected: 02/28/21 0106    Lab Status: Final result Specimen: Blood from Arm, Left Updated: 02/28/21 0128     Protime 14 9 seconds      INR 1 17    APTT [[id number]]  (Normal) Collected: 02/28/21 0106    Lab Status: Final result Specimen: Blood from Arm, Left Updated: 02/28/21 0128     PTT 27 seconds     CBC and differential [[id number]]  (Abnormal) Collected: 02/28/21 0106    Lab Status: Final result Specimen: Blood from Arm, Left Updated: 02/28/21 0113     WBC 7 15 Thousand/uL      RBC 4 35 Million/uL      Hemoglobin 13 7 g/dL      Hematocrit 44 0 %       fL      MCH 31 5 pg      MCHC 31 1 g/dL      RDW 14 1 %      MPV 10 6 fL      Platelets 619 Thousands/uL      Neutrophils Relative 75 %      Immat GRANS % 0 %      Lymphocytes Relative 13 %      Monocytes Relative 11 %      Eosinophils Relative 1 %      Basophils Relative 0 %      Neutrophils Absolute 5 29 Thousands/µL      Immature Grans Absolute 0 02 Thousand/uL      Lymphocytes Absolute 0 95 Thousands/µL      Monocytes Absolute 0 77 Thousand/µL      Eosinophils Absolute 0 09 Thousand/µL      Basophils Absolute 0 03 Thousands/µL     Fingerstick Glucose (POCT) [[id number]]  (Normal) Collected: 02/28/21 0101    Lab Status: Final result Updated: 02/28/21 0102     POC Glucose 107 mg/dl                  CT head without contrast   Final Result by Libra Angelo DO (02/28 0136)      No acute intracranial abnormality  Workstation performed: [id number]         XR chest 1 view portable    (Results Pending)              Procedures  ECG 12 Lead Documentation Only    Date/Time: 2/28/2021 1:10 AM  Performed by: Magali Cox DO  Authorized by: Magali Cox DO     Indications / Diagnosis:  CP, SOB  Patient location:  ED  Interpretation:     Interpretation: normal    Rate:     ECG rate:  96    ECG rate assessment: normal    Rhythm:     Rhythm: sinus rhythm    Ectopy:     Ectopy: none    QRS:     QRS axis:  Normal    QRS intervals:  Normal  Conduction:     Conduction: normal    ST segments:     ST segments:  Normal  T waves:     T waves: normal               ED Course  ED Course as of Feb 28 0702   Sun Feb 28, 2021   0106 Pt seen and examined  69 yo male presenting with SOB and central CP  Per pt for the past 2 weeks he has not felt well - offers vague complaints of waxing and waning body aches, central chest pain he describes as "strange", chills, SOB with productive cough - was bring up phlegm last week, no longer productive but sounds loose, dizziness  Pt has NOT had COVID and was last tested in December prior to eye surgery, has NOT been vaccinated and works nights at [employer]  Did not show up for his shift tonight and coworker called police to do welfare check  EMS was called as pt was weak and too dizzy to stand  , VSS  Will check labs, EKG, chest xray and CT head  0128 Chest xray shows pulmonary edema - lasix ordered  No old for comparison - radiologist reading  0139 BNP 5600, CT head - No acute intracranial abnormality  Trop 46 19  Will give ASA and start on hep gtt and d/w cardiology        1269 Case discussed with Dr Farzad Ang (on call cardiology), fine with ASA/hep gtt         0202 Spoke with Juan Mcfadden from PACS - transfer to [facility] where there is an open bed [facility] bed fine with cardiology)  0214 RR 26, sats 91% RA - placed on 2 L for comfort  [address] neg  SBIRT 22yo+      Most Recent Value   SBIRT (22 yo +)   In order to provide better care to our patients, we are screening all of our patients for alcohol and drug use  Would it be okay to ask you these screening questions? Yes Filed at: 02/28/2021 0111   Initial Alcohol Screen: US AUDIT-C    1  How often do you have a drink containing alcohol? 1 Filed at: 02/28/2021 0111   2  How many drinks containing alcohol do you have on a typical day you are drinking? 1 Filed at: 02/28/2021 0111   3a  Male UNDER 65: How often do you have five or more drinks on one occasion? 0 Filed at: 02/28/2021 0111   3b  FEMALE Any Age, or MALE 65+: How often do you have 4 or more drinks on one occassion? 0 Filed at: 02/28/2021 0111   Audit-C Score  2 Filed at: 02/28/2021 0111   FAITH: How many times in the past year have you    Used an illegal drug or used a prescription medication for non-medical reasons?   Never Filed at: 02/28/2021 0111                    MDM    Disposition  Final diagnoses:   NSTEMI (non-ST elevated myocardial infarction) (HCC)   CHF (congestive heart failure) (HCC)   Chest pain   SOB (shortness of breath)   Cough     Time reflects when diagnosis was documented in both MDM as applicable and the Disposition within this note     Time User Action Codes Description Comment    2/28/2021  2:34 AM Janett LOWE Add [I21 4] NSTEMI (non-ST elevated myocardial infarction) ([facility] [address] )     2/28/2021  2:34 AM Janett LOWE Add [I50 9] [facility]     2/28/2021  2:34 AM Janett LOWE Add [R07 9] Chest pain     2/28/2021  2:34 AM Janett LOWE Add [R06 02] SOB (shortness of breath)     2/28/2021 2:34 AM Lucia Orr Add [R05] Cough       ED Disposition     ED Disposition Condition Date/Time Comment    Transfer to Another Facility-In [facility]  Sun Feb 28, 2021  2:34 AM Hilario Ramos should be transferred out to [facility] Dr Sophia Cabral MD Documentation      Most Recent Value   Patient Condition  The patient has been stabilized such that within reasonable medical probability, no material deterioration of the patient condition or the condition of the unborn child(imelda) is likely to result from the transfer   Reason for Transfer  Level of Care needed not available at this facility   Benefits of Transfer  Specialized equipment and/or services available at the receiving facility (Include comment)________________________ [facility]]   Risks of Transfer  Potential deterioration of medical condition   Accepting Physician  Scarlett Valera [address] Name, [address] [place] [address],[address]    (Name & Tel number)  Amandeep Vieyra   Sending MD Monisha Morales   Provider Certification  Risk of worsening condition      RN [address] [place] [address] Name, [address] [place] [address],[address]    (Name & Tel number)  Amandeep Vieyra      Follow-up Information    None         Patient's Medications   Discharge Prescriptions    No medications on file     No discharge procedures on file      PDMP Review     None          ED Provider  Electronically Signed by           Jaime Arambula DO  02/28/21 5627

## 2021-02-28 NOTE — CONSULTS
Consultation - Electrophysiology-Cardiology (EP)   Ranjit Amaro 68 y o  male MRN: [id number]  Unit/Bed#: E4 -01 Encounter: [id number]    Consults  Physician Requesting Consult: Duey Riding, DO  Reason for Consult / Principal Problem:  Elevated troponins and chest discomfort      Assessment/Plan   chest pain -  Approximately 7 days ago - currently resolved  shortness of breath - better with rest,  BNP>5000,   elevated troponin + no ST segment elevation + Q waves in II  III  AVF -  Suspect recent MI more than 3day-old     diabetes mellitus-10 years -on insulin  Tobacco abuse - 10 years-smokes a pipe      patient originally had chest pain about a week ago  he has slowly progressive worsening shortness of breath while ambulating  patient is currently not complaining of any significant symptoms-no chest pain or pressure/  No shortness of breath while resting    patient is currently hemodynamically stable, blood pressure 120 / 70,  Heart rate 70 -80/min       patient does have risk factors of tobacco use and diabetes     recommend  aspirin +plavix + heparin    NPO after midnight for cath in AM    Echo - assess LVEF, pericardial space  - now     Avoid beta blocker at this time - recent significant change in BBB    Avoid diuresis - recent inferior wall MI     if any urinary bleeding, need to use low-dose heparin instead of full-dose and consult Urology            History of Present Illness   HPI: Ranijt Amaro is a 68y o  year old male who presents with worsening shortness of breath and exertional intolerance   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  He does the night shift, and he is on his feet all the time  No history of preceding anginal like chest pain or chest pressure   No history of worsening leg swelling   No history of presyncope or syncope   No history of palpitation  No history of orthostatic lightheadedness       he does have a history of diabetes for over 10 years   He has been on insulin for the time     he does smoke a pipe for over 10 years now   he does not abuse alcohol   He does not use any recreational drugs     he does not have a history of snoring, morning fatigue or daytime sleepiness   He does sleep during the day as he does the night shift     he also has a family history of  diabetes        Review of Systems:  As described in my history and physical   All other systems reviewed and negative    Historical Information   Past Medical History:   Diagnosis Date    Arthritis     Diabetes mellitus ([facility] [address] )     Hyperlipidemia      Past Surgical History:   Procedure Laterality Date    TOTAL HIP ARTHROPLASTY       Social History     Substance and Sexual Activity   Alcohol Use No     Social History     Substance and Sexual Activity   Drug Use No     Social History     Tobacco Use   Smoking Status Current Every Day Smoker    Types: Pipe   Smokeless Tobacco Never Used     Social History     Socioeconomic History    Marital status: Single     Spouse name: Not on file    Number of children: Not on file    Years of education: Not on file    Highest education level: Not on file   Occupational History    Not on file   Social Needs    Financial resource strain: Not on file    Food insecurity     Worry: Not on file     Inability: Not on file    Transportation needs     Medical: Not on file     Non-medical: Not on file   Tobacco Use    Smoking status: Current Every Day Smoker Types: Pipe    Smokeless tobacco: Never Used   Substance and Sexual Activity    Alcohol use: No    Drug use: No    Sexual activity: Not on file   Lifestyle    Physical activity     Days per week: Not on file     Minutes per session: Not on file    Stress: Not on file   Relationships    Social connections     Talks on phone: Not on file     Gets together: Not on file     Attends Hoahaoism service: Not on file     Active member of club or organization: Not on file     Attends meetings of clubs or organizations: Not on file     Relationship status: Not on file    Intimate partner violence     Fear of current or ex partner: Not on file     Emotionally abused: Not on file     Physically abused: Not on file     Forced sexual activity: Not on file   Other Topics Concern    Not on file   Social History Narrative    Not on file       Family History:  Family History   Problem Relation Age of Onset    No Known Problems Mother     Arthritis Father     Diabetes unspecified Brother          Meds/Allergies      No current facility-administered medications for this encounter  Medications Prior to Admission   Medication    alendronate (FOSAMAX) 70 mg tablet    aspirin 81 MG tablet    cholecalciferol (VITAMIN D3) 1,000 units tablet    clobetasol (TEMOVATE) 0 05 % cream    cyanocobalamin (VITAMIN B-12) 1,000 mcg tablet    ergocalciferol (VITAMIN D2) 50,000 units    glipiZIDE (GLIPIZIDE XL) 10 mg 24 hr tablet    insulin NPH-insulin regular (NOVOLIN 70/30 RELION) 100 units/mL subcutaneous injection    Insulin Syringe-Needle U-100 (RELI-ON INS SYR  5CC/29G) 29G 0 5 ML MISC    pioglitazone (ACTOS) 45 mg tablet       Allergies   Allergen Reactions    Metformin Diarrhea       I have reviewed medications and allergies  Objective   Vitals: Blood pressure 135/69, pulse 76, temperature 98 °F (36 7 °C), temperature source Temporal, resp  rate 20, SpO2 98 %    Orthostatic Blood Pressures      Most Recent Value   Blood Pressure  135/69 filed at 02/28/2021 0967   Patient Position - Orthostatic VS  Lying filed at 02/28/2021 0523          No intake or output data in the 24 hours ending 02/28/21 0946    Invasive Devices     Peripheral Intravenous Line            Peripheral IV 02/28/21 Left Forearm less than 1 day    Peripheral IV 02/28/21 Right Forearm less than 1 day                  PHYSICAL EXAM    The patient is currently not in any acute distress  No pallor cyanosis or icterus  Normal oral mucosa, mucosa is moist, no central cyanosis or icterus, posterior pharynx is not well visualized  Neck examined, no jugular lymphadenopathy, no thyromegaly,  No significant chest wall deformity  Bilateral air entry is normal, somewhat diminished at the bases, no significant crackles, no rhonchi  S1-S2 currently regular, no S3-S4 or murmur, no rubs or gallops  Abdomen is soft and nontender, normal bowel sounds,   Awake alert and oriented, facial symmetry is retained, extraocular movements are full and symmetric, head neck down and palate movement is bilateral and symmetric, speech is normal, follows commands and moves limbs  Normal mood and affect  No peripheral cyanosis or clubbing, no significant peripheral edema  No obvious rash ulcer nodules on exposed area of the skin  No jugular lymphadenopathy          Lab Results:   Results from last 7 days   Lab Units 02/28/21  0106   WBC Thousand/uL 7 15   HEMOGLOBIN g/dL 13 7   HEMATOCRIT % 44 0   PLATELETS Thousands/uL 231     Results from last 7 days   Lab Units 02/28/21  0106   POTASSIUM mmol/L 4 5   CHLORIDE mmol/L 101   CO2 mmol/L 24   BUN mg/dL 25   CREATININE mg/dL 1 16   CALCIUM mg/dL 9 0     Results from last 7 days   Lab Units 02/28/21  0821 02/28/21  0106   INR   --  1 17   PTT seconds 52* 27           Magnesium: No results found for: MG    CPK:   Results from last 7 days   Lab Units 02/28/21  0106   CK TOTAL U/L 426*       Troponin:   Lab Results   Component Value Date TROPONINI 38 91 (H) 02/28/2021       BNP: No results found for: BNP    Coags:   Lab Results   Component Value Date    PTT 52 (H) 02/28/2021    PTT 24 05/21/2015    INR 1 17 02/28/2021    INR 1 00 05/21/2015       TSH: No results found for: TSH    HbA1c:   Lab Results   Component Value Date    HGBA1C 11 4 (H) 06/19/2018        Lipid Profile: No results found for: LABLIPI        Imaging:   ECHO: No results found for this or any previous visit        EKG:   EKG reviewed by myself,   normal sinus rhythm, normal axis, low voltage in the limb leads, Q-waves noted in inferior leads  Suggestive of MI   rS  In leads V1 to V3, left bundle branch conduction delay,  milliseconds,  milliseconds  WA is 140 millisecond     significant change in morphology of QRS in the precordial leads suggestive of conduction system bein affected   original-RBBB plus LAFB -  RB and LAF are both anterior structures   currently more incomplete LBBB   this is more suggestive of inferior structure involvement, matches with possible inferior wall MI            Code Status: No Order  Advance Directive and Living Will:      Power of :    POLST:      Counseling / Coordination of Care   very detailed discussion done with the patient, currently will be conservative medical therapy, plan for cardiac catheterization on Monday morning     a total of 90 minutes was spent in the case of which 90% time was with the patient discussing plan care      Mikey Laureano MD

## 2021-02-28 NOTE — ASSESSMENT & PLAN NOTE
· NSTEMI question secondary to viral syndrome given debility for over a week vs ACS  · Seen by cardiology  Continue heparin infusion  Monitor on telemetry and trend cardiac enzymes    · Check echocardiogram given abnormal CXR and concern for cardiomyopathy    Results from last 7 days   Lab Units 02/28/21  0821 02/28/21  0405 02/28/21  0106   TROPONIN I ng/mL 38 91* 38 97* >40 00*

## 2021-03-01 ENCOUNTER — APPOINTMENT (INPATIENT)
Dept: NON INVASIVE DIAGNOSTICS | Facility: HOSPITAL | Age: 81
DRG: 246 | End: 2021-03-01
Payer: COMMERCIAL

## 2021-03-01 ENCOUNTER — APPOINTMENT (INPATIENT)
Dept: NON INVASIVE DIAGNOSTICS | Facility: HOSPITAL | Age: 81
DRG: 246 | End: 2021-03-01
Attending: INTERNAL MEDICINE
Payer: COMMERCIAL

## 2021-03-01 LAB
ALBUMIN SERPL BCP-MCNC: 2.8 G/DL (ref 3.5–5)
ALP SERPL-CCNC: 99 U/L (ref 46–116)
ALT SERPL W P-5'-P-CCNC: 39 U/L (ref 12–78)
ANION GAP SERPL CALCULATED.3IONS-SCNC: 6 MMOL/L (ref 4–13)
APTT PPP: 65 SECONDS (ref 23–37)
AST SERPL W P-5'-P-CCNC: 45 U/L (ref 5–45)
BILIRUB SERPL-MCNC: 0.5 MG/DL (ref 0.2–1)
BUN SERPL-MCNC: 31 MG/DL (ref 5–25)
CALCIUM ALBUM COR SERPL-MCNC: 10.2 MG/DL (ref 8.3–10.1)
CALCIUM SERPL-MCNC: 9.2 MG/DL (ref 8.3–10.1)
CHLORIDE SERPL-SCNC: 103 MMOL/L (ref 100–108)
CO2 SERPL-SCNC: 26 MMOL/L (ref 21–32)
CREAT SERPL-MCNC: 1.19 MG/DL (ref 0.6–1.3)
ERYTHROCYTE [DISTWIDTH] IN BLOOD BY AUTOMATED COUNT: 13.6 % (ref 11.6–15.1)
GFR SERPL CREATININE-BSD FRML MDRD: 58 ML/MIN/1.73SQ M
GLUCOSE SERPL-MCNC: 132 MG/DL (ref 65–140)
GLUCOSE SERPL-MCNC: 136 MG/DL (ref 65–140)
GLUCOSE SERPL-MCNC: 163 MG/DL (ref 65–140)
GLUCOSE SERPL-MCNC: 176 MG/DL (ref 65–140)
GLUCOSE SERPL-MCNC: 208 MG/DL (ref 65–140)
GLUCOSE SERPL-MCNC: 228 MG/DL (ref 65–140)
HCT VFR BLD AUTO: 38.3 % (ref 36.5–49.3)
HCV AB SER QL: NORMAL
HGB BLD-MCNC: 12.6 G/DL (ref 12–17)
KCT BLD-ACNC: 255 SEC (ref 89–137)
MCH RBC QN AUTO: 32.6 PG (ref 26.8–34.3)
MCHC RBC AUTO-ENTMCNC: 32.9 G/DL (ref 31.4–37.4)
MCV RBC AUTO: 99 FL (ref 82–98)
PLATELET # BLD AUTO: 210 THOUSANDS/UL (ref 149–390)
PMV BLD AUTO: 10.9 FL (ref 8.9–12.7)
POTASSIUM SERPL-SCNC: 4.3 MMOL/L (ref 3.5–5.3)
PROT SERPL-MCNC: 6.9 G/DL (ref 6.4–8.2)
RBC # BLD AUTO: 3.87 MILLION/UL (ref 3.88–5.62)
SODIUM SERPL-SCNC: 135 MMOL/L (ref 136–145)
SPECIMEN SOURCE: ABNORMAL
TROPONIN I SERPL-MCNC: 30.86 NG/ML
WBC # BLD AUTO: 7.02 THOUSAND/UL (ref 4.31–10.16)

## 2021-03-01 PROCEDURE — 93458 L HRT ARTERY/VENTRICLE ANGIO: CPT | Performed by: INTERNAL MEDICINE

## 2021-03-01 PROCEDURE — C1769 GUIDE WIRE: HCPCS | Performed by: INTERNAL MEDICINE

## 2021-03-01 PROCEDURE — 84484 ASSAY OF TROPONIN QUANT: CPT | Performed by: INTERNAL MEDICINE

## 2021-03-01 PROCEDURE — B2151ZZ FLUOROSCOPY OF LEFT HEART USING LOW OSMOLAR CONTRAST: ICD-10-PCS | Performed by: INTERNAL MEDICINE

## 2021-03-01 PROCEDURE — 85730 THROMBOPLASTIN TIME PARTIAL: CPT | Performed by: INTERNAL MEDICINE

## 2021-03-01 PROCEDURE — 99152 MOD SED SAME PHYS/QHP 5/>YRS: CPT | Performed by: INTERNAL MEDICINE

## 2021-03-01 PROCEDURE — 027034Z DILATION OF CORONARY ARTERY, ONE ARTERY WITH DRUG-ELUTING INTRALUMINAL DEVICE, PERCUTANEOUS APPROACH: ICD-10-PCS | Performed by: INTERNAL MEDICINE

## 2021-03-01 PROCEDURE — B2111ZZ FLUOROSCOPY OF MULTIPLE CORONARY ARTERIES USING LOW OSMOLAR CONTRAST: ICD-10-PCS | Performed by: INTERNAL MEDICINE

## 2021-03-01 PROCEDURE — C1887 CATHETER, GUIDING: HCPCS | Performed by: INTERNAL MEDICINE

## 2021-03-01 PROCEDURE — 93306 TTE W/DOPPLER COMPLETE: CPT | Performed by: INTERNAL MEDICINE

## 2021-03-01 PROCEDURE — C1725 CATH, TRANSLUMIN NON-LASER: HCPCS | Performed by: INTERNAL MEDICINE

## 2021-03-01 PROCEDURE — 80053 COMPREHEN METABOLIC PANEL: CPT | Performed by: INTERNAL MEDICINE

## 2021-03-01 PROCEDURE — 85027 COMPLETE CBC AUTOMATED: CPT | Performed by: INTERNAL MEDICINE

## 2021-03-01 PROCEDURE — 93306 TTE W/DOPPLER COMPLETE: CPT

## 2021-03-01 PROCEDURE — 82948 REAGENT STRIP/BLOOD GLUCOSE: CPT

## 2021-03-01 PROCEDURE — 4A023N7 MEASUREMENT OF CARDIAC SAMPLING AND PRESSURE, LEFT HEART, PERCUTANEOUS APPROACH: ICD-10-PCS | Performed by: INTERNAL MEDICINE

## 2021-03-01 PROCEDURE — 92928 PRQ TCAT PLMT NTRAC ST 1 LES: CPT | Performed by: INTERNAL MEDICINE

## 2021-03-01 PROCEDURE — 85347 COAGULATION TIME ACTIVATED: CPT

## 2021-03-01 PROCEDURE — C1894 INTRO/SHEATH, NON-LASER: HCPCS | Performed by: INTERNAL MEDICINE

## 2021-03-01 PROCEDURE — C9600 PERC DRUG-EL COR STENT SING: HCPCS | Performed by: INTERNAL MEDICINE

## 2021-03-01 PROCEDURE — C1874 STENT, COATED/COV W/DEL SYS: HCPCS

## 2021-03-01 PROCEDURE — 99233 SBSQ HOSP IP/OBS HIGH 50: CPT | Performed by: INTERNAL MEDICINE

## 2021-03-01 PROCEDURE — 99232 SBSQ HOSP IP/OBS MODERATE 35: CPT | Performed by: INTERNAL MEDICINE

## 2021-03-01 PROCEDURE — 99153 MOD SED SAME PHYS/QHP EA: CPT | Performed by: INTERNAL MEDICINE

## 2021-03-01 RX ORDER — MIDAZOLAM HYDROCHLORIDE 2 MG/2ML
INJECTION, SOLUTION INTRAMUSCULAR; INTRAVENOUS CODE/TRAUMA/SEDATION MEDICATION
Status: COMPLETED | OUTPATIENT
Start: 2021-03-01 | End: 2021-03-01

## 2021-03-01 RX ORDER — SODIUM CHLORIDE 9 MG/ML
INJECTION, SOLUTION INTRAVENOUS
Status: COMPLETED | OUTPATIENT
Start: 2021-03-01 | End: 2021-03-01

## 2021-03-01 RX ORDER — LIDOCAINE HYDROCHLORIDE 10 MG/ML
INJECTION, SOLUTION EPIDURAL; INFILTRATION; INTRACAUDAL; PERINEURAL CODE/TRAUMA/SEDATION MEDICATION
Status: COMPLETED | OUTPATIENT
Start: 2021-03-01 | End: 2021-03-01

## 2021-03-01 RX ORDER — VERAPAMIL HYDROCHLORIDE 2.5 MG/ML
INJECTION, SOLUTION INTRAVENOUS CODE/TRAUMA/SEDATION MEDICATION
Status: COMPLETED | OUTPATIENT
Start: 2021-03-01 | End: 2021-03-01

## 2021-03-01 RX ORDER — NITROGLYCERIN 20 MG/100ML
INJECTION INTRAVENOUS CODE/TRAUMA/SEDATION MEDICATION
Status: COMPLETED | OUTPATIENT
Start: 2021-03-01 | End: 2021-03-01

## 2021-03-01 RX ORDER — FENTANYL CITRATE 50 UG/ML
INJECTION, SOLUTION INTRAMUSCULAR; INTRAVENOUS CODE/TRAUMA/SEDATION MEDICATION
Status: COMPLETED | OUTPATIENT
Start: 2021-03-01 | End: 2021-03-01

## 2021-03-01 RX ORDER — HEPARIN SODIUM 1000 [USP'U]/ML
INJECTION, SOLUTION INTRAVENOUS; SUBCUTANEOUS CODE/TRAUMA/SEDATION MEDICATION
Status: COMPLETED | OUTPATIENT
Start: 2021-03-01 | End: 2021-03-01

## 2021-03-01 RX ORDER — ATORVASTATIN CALCIUM 80 MG/1
80 TABLET, FILM COATED ORAL
Status: DISCONTINUED | OUTPATIENT
Start: 2021-03-01 | End: 2021-03-03 | Stop reason: HOSPADM

## 2021-03-01 RX ADMIN — ASPIRIN 81 MG CHEWABLE TABLET 81 MG: 81 TABLET CHEWABLE at 09:27

## 2021-03-01 RX ADMIN — FENTANYL CITRATE 50 MCG: 50 INJECTION, SOLUTION INTRAMUSCULAR; INTRAVENOUS at 11:21

## 2021-03-01 RX ADMIN — NICOTINE 7 MG/24 HR DAILY TRANSDERMAL PATCH 1 PATCH: at 09:25

## 2021-03-01 RX ADMIN — HEPARIN SODIUM 14 UNITS/KG/HR: 10000 INJECTION, SOLUTION INTRAVENOUS at 00:21

## 2021-03-01 RX ADMIN — VERAPAMIL HYDROCHLORIDE 2.5 MG: 2.5 INJECTION, SOLUTION INTRAVENOUS at 11:24

## 2021-03-01 RX ADMIN — LIDOCAINE HYDROCHLORIDE 1 ML: 10 INJECTION, SOLUTION EPIDURAL; INFILTRATION; INTRACAUDAL; PERINEURAL at 11:22

## 2021-03-01 RX ADMIN — HEPARIN SODIUM 4000 UNITS: 1000 INJECTION INTRAVENOUS; SUBCUTANEOUS at 11:24

## 2021-03-01 RX ADMIN — INSULIN LISPRO 1 UNITS: 100 INJECTION, SOLUTION INTRAVENOUS; SUBCUTANEOUS at 22:39

## 2021-03-01 RX ADMIN — ATORVASTATIN CALCIUM 80 MG: 80 TABLET, FILM COATED ORAL at 16:38

## 2021-03-01 RX ADMIN — MIDAZOLAM 1 MG: 1 INJECTION INTRAMUSCULAR; INTRAVENOUS at 11:21

## 2021-03-01 RX ADMIN — INSULIN ASPART 15 UNITS: 100 INJECTION, SUSPENSION SUBCUTANEOUS at 16:38

## 2021-03-01 RX ADMIN — TICAGRELOR 90 MG: 90 TABLET ORAL at 20:25

## 2021-03-01 RX ADMIN — NITROGLYCERIN 200 MCG: 20 INJECTION INTRAVENOUS at 11:24

## 2021-03-01 RX ADMIN — Medication 1000 UNITS: at 09:23

## 2021-03-01 RX ADMIN — HEPARIN SODIUM 6000 UNITS: 1000 INJECTION INTRAVENOUS; SUBCUTANEOUS at 11:44

## 2021-03-01 RX ADMIN — IOHEXOL 110 ML: 350 INJECTION, SOLUTION INTRAVENOUS at 11:55

## 2021-03-01 RX ADMIN — CYANOCOBALAMIN TAB 500 MCG 1000 MCG: 500 TAB at 09:23

## 2021-03-01 RX ADMIN — SODIUM CHLORIDE 100 ML/HR: 0.9 INJECTION, SOLUTION INTRAVENOUS at 11:10

## 2021-03-01 RX ADMIN — INSULIN LISPRO 2 UNITS: 100 INJECTION, SOLUTION INTRAVENOUS; SUBCUTANEOUS at 16:40

## 2021-03-01 NOTE — ASSESSMENT & PLAN NOTE
· Type 1 myocardial infarction with troponin greater than 40  · He has significant 3 vessel coronary artery disease  · Due to his comorbidities is not considered a CABG candidate  · Underwent PCI to the LAD  Severely reduced ejection fraction with a EF of 22%  Will need LifeVest at discharge  Results from last 7 days   Lab Units 03/01/21  0518 02/28/21  1809 02/28/21  1416 02/28/21  0821 02/28/21  0405 02/28/21  0106   TROPONIN I ng/mL 30 86* 31 55* 33 98* 38 91* 38 97* >40 00*

## 2021-03-01 NOTE — UTILIZATION REVIEW
Initial Clinical Review    Admission: Date/Time/Statement:   Admission Orders (From admission, onward)     Ordered        02/28/21 0916  Inpatient Admission  Once                   Orders Placed This Encounter   Procedures    Inpatient Admission     Standing Status:   Standing     Number of Occurrences:   1     Order Specific Question:   Level of Care     Answer:   Med Surg [16]     Order Specific Question:   Estimated length of stay     Answer:   More than 2 Midnights     Order Specific Question:   Certification     Answer:   I certify that inpatient services are medically necessary for this patient for a duration of greater than two midnights  See H&P and MD Progress Notes for additional information about the patient's course of treatment  Assessment/Plan: 69 yo male Transferred from ED @ [facility] to [address] [place] [address] due to NSTEMI /Cardio f/u  /Bed availability  Pt with PMH DM & osteoporosis  Police were called for safety check when pt did not show up for work  Pt c/o vague complaints x > 1 week  with body aches, chills, SOB w/ productive cough & EMS was called due to weakness & unable to stand, dizziness  CXR showed pulmonary edema, BNP 5600, Trop 46 19  Found to have a NSTEMI  Rec'd asa, Lasix 40 IV and  started on Heparin drip in the ED prior to transfer       Admitted to Inpatient and Plan is for Telemetry, trend cardiac enzymes, Cardio consult, continue Heparin drip, ECHO, NPO @ midnight for cardiac cath  Per Cardio:  Pt with chest pain approx 7 days ago, elevated troponin + no ST segment elevation + Q waves in II  III  AVF  Suspect recent MI more than 3day-old  Recommend aspirin +plavix + heparin  NPO after midnight for cath in AM, Echo  Avoid beta blocker at this time - recent significant change in BBB  Avoid diuresis - recent inferior wall MI    3/1 Cardiac Cath:  CORONARY VESSELS:   --  The coronary circulation is right dominant    --  Left main: Normal  Angiography showed mild atherosclerosis  --  Proximal LAD: There was a 90 % stenosis  --  Mid LAD: There was a 90 % stenosis  --  Mid circumflex: There was a 100 % stenosis  This lesion is a chronic total occlusion  --  1st obtuse marginal: The distal vessel was supplied by limited collaterals  Too small small bypass target/stenting   --  Distal RCA: There was a 100 % stenosis  This lesion is a likely culprit for the patient's recent myocardial infarction 5 days ago  Distal vessel poor target/extremely small      IMPRESSIONS:  3V CAD, with completely occluded LCX and RCA poor distal targets for CABG  RCA culprit for MI (symptomatically 5 days ago)  Proceeded with PCI LAD with MOSHE     RECOMMENDATIONS  GDMT CAD  LCX occluded and too small distally meaningful revascularization/no target cabg   RCA culprit for MI 5 days ago(too late now in presentation consider pci)    Admission Vitals   Temperature Pulse Respirations Blood Pressure SpO2   02/28/21 0812 02/28/21 0812 02/28/21 0812 02/28/21 0812 02/28/21 0812   98 °F (36 7 °C) 76 20 135/69 98 %      Temp Source Heart Rate Source Patient Position - Orthostatic VS BP Location FiO2 (%)   02/28/21 0812 -- 02/28/21 0812 02/28/21 0812 --   Temporal  Lying Right arm       Pain Score       02/28/21 0748       No Pain          Wt Readings from Last 1 Encounters:   02/28/21 81 6 kg (179 lb 14 3 oz)     Additional Vital Signs:   03/01/21 0733  98 °F (36 7 °C)  71  18  106/56  --  95 % 2 L/min Nasal cannula Lying   02/28/21 2328  97 7 °F (36 5 °C)  81  18  102/59  --  94 % -- Nasal cannula Lying   02/28/21 2030  --  --  --  --  --  -- 2 L/min Nasal cannula --   02/28/21 1932  97 7 °F (36 5 °C)  90  18  98/62  74  95 % -- Nasal cannula Lying   02/28/21 1555  97 9 °F (36 6 °C)  87  18  108/59  79  95 % -- Nasal cannula Lying   02/28/21 1100  98 4 °F (36 9 °C)  78  20  120/66  --  99 % 2 L/min Nasal cannula Lying   02/28/21 0947  98 °F (36 7 °C)  79  20  125/69  --  98 % 2 L/min Nasal cannula Lying Pertinent Labs/Diagnostic Test Results:   Results from last 7 days   Lab Units 02/28/21  0110   SARS-COV-2  Negative     Results from last 7 days   Lab Units 03/01/21  0518 02/28/21  0106   WBC Thousand/uL 7 02 7 15   HEMOGLOBIN g/dL 12 6 13 7   HEMATOCRIT % 38 3 44 0   PLATELETS Thousands/uL 210 231   NEUTROS ABS Thousands/µL  --  5 29     Results from last 7 days   Lab Units 03/01/21  0518 02/28/21  0106   SODIUM mmol/L 135* 137   POTASSIUM mmol/L 4 3 4 5   CHLORIDE mmol/L 103 101   CO2 mmol/L 26 24   ANION GAP mmol/L 6 12   BUN mg/dL 31* 25   CREATININE mg/dL 1 19 1 16   EGFR ml/min/1 73sq m 58 60   CALCIUM mg/dL 9 2 9 0     Results from last 7 days   Lab Units 03/01/21  0518 02/28/21  0106   AST U/L 45 83*   ALT U/L 39 61   ALK PHOS U/L 99 120*   TOTAL PROTEIN g/dL 6 9 7 9   ALBUMIN g/dL 2 8* 3 4*   TOTAL BILIRUBIN mg/dL 0 50 1 00     Results from last 7 days   Lab Units 03/01/21  0725 02/28/21  2104 02/28/21  1608 02/28/21  1132 02/28/21  0752 02/28/21  0101   POC GLUCOSE mg/dl 163* 158* 224* 115 130 107     Results from last 7 days   Lab Units 03/01/21  0518 02/28/21  0106   GLUCOSE RANDOM mg/dL 208* 117     Results from last 7 days   Lab Units 02/28/21  1416   HEMOGLOBIN A1C % 7 5*   EAG mg/dl 169     Results from last 7 days   Lab Units 02/28/21  0106   CK TOTAL U/L 426*   CK MB INDEX % 7 3*   CK MB ng/mL 31 0*     Results from last 7 days   Lab Units 03/01/21  0518 02/28/21  1809 02/28/21  1416 02/28/21  0821 02/28/21  0405 02/28/21  0106   TROPONIN I ng/mL 30 86* 31 55* 33 98* 38 91* 38 97* >40 00*     Results from last 7 days   Lab Units 03/01/21  0518 02/28/21  2027 02/28/21  1416  02/28/21 0106   PROTIME seconds  --   --   --   --  14 9*   INR   --   --   --   --  1 17   PTT seconds 65* 64* 74*   < > 27    < > = values in this interval not displayed       Results from last 7 days   Lab Units 02/28/21  1416   TSH 3RD GENERATON uIU/mL 1 587     Results from last 7 days   Lab Units 02/28/21 0106 NT-PRO BNP pg/mL 5,602*     Results from last 7 days   Lab Units 02/28/21  1416   HEP C AB  Non-reactive     Results from last 7 days   Lab Units 02/28/21  0215   CLARITY UA  Clear   COLOR UA  Straw   SPEC GRAV UA  1 020   PH UA  5 5   GLUCOSE UA mg/dl Negative   KETONES UA mg/dl 15 (1+)*   BLOOD UA  Negative   PROTEIN UA mg/dl Negative   NITRITE UA  Negative   BILIRUBIN UA  Negative   UROBILINOGEN UA E U /dl 0 2   LEUKOCYTES UA  Negative     Results from last 7 days   Lab Units 02/28/21  0110   INFLUENZA A PCR  Negative   INFLUENZA B PCR  Negative   RSV PCR  Negative       Past Medical History:   Diagnosis Date    Arthritis     Diabetes mellitus ([place] [address] )     Hyperlipidemia     Osteoporosis      Present on Admission:   Hypertension   Hyperlipidemia   NSTEMI (non-ST elevated myocardial infarction) ([place] [address] )   Smokes a pipe   Transaminitis   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   Hyperglycemia due to type 2 diabetes mellitus ([place] [address] )      Admitting Diagnosis: Chest pain [R07 9]  Age/Sex: 68 y o  male  Admission Orders:  Scheduled Medications:  aspirin, 81 mg, Oral, Daily  cholecalciferol, 1,000 Units, Oral, Daily  vitamin B-12, 1,000 mcg, Oral, Daily  insulin aspart protamine-insulin aspart, 15 Units, Subcutaneous, BID AC  insulin lispro, 1-6 Units, Subcutaneous, 4x Daily (AC & HS)  nicotine, 1 patch, Transdermal, Daily    Continuous IV Infusions:  heparin (porcine), 3-20 Units/kg/hr (Order-Specific), Intravenous, Titrated  sodium chloride, , , Code/Trauma/Sedation Continuous Med    PRN Meds:  acetaminophen, 650 mg, Oral, Q6H PRN  heparin (porcine), 2,000 Units, Intravenous, Q1H PRN  heparin (porcine), 4,000 Units, Intravenous, Q1H PRN  heparin (porcine), , , Code/Trauma/Sedation Med  magnesium hydroxide, 30 mL, Oral, BID PRN  morphine injection, 2 mg, Intravenous, Q4H PRN  nitroglycerin, 0 4 mg, Sublingual, Q5 Min PRN  ondansetron, 4 mg, Intravenous, Q6H PRN  oxyCODONE, 5 mg, Oral, Q4H PRN    TELEMETRY  IP CONSULT TO CARDIOLOGY    Network Utilization Review Department  ATTENTION: Please call with any questions or concerns to [phone] and carefully listen to the prompts so that you are directed to the right person  All voicemails are confidential   Duane Tyler all requests for admission clinical reviews, approved or denied determinations and any other requests to dedicated fax number below belonging to the campus where the patient is receiving treatment   List of dedicated fax numbers for the Facilities:  [address] DENIALS (Administrative/Medical Necessity) [phone]   [address] (Maternity/NICU/Pediatrics) [phone]   [address] [place] [address] Dr Inez Schaefer [phone] (  [address] "[address]" 103) [phone] [place] [address] Scarlett Arlette Jules 1481 [address] [place]) [address] [place], [place] [address] [phone]

## 2021-03-01 NOTE — PROGRESS NOTES
Progress Note - Manuel Miner 1940, 68 y o  male MRN: [id number]    Unit/Bed#: E4 -01 Encounter: [id number]    Primary Care Provider: Ann-Marie Villafuerte MD   Date and time admitted to hospital: 2/28/2021  7:35 AM        Transaminitis  Assessment & Plan  · Transaminitis may be secondary to fatty liver or viral syndrome  Will check hepatitis-C  Results from last 7 days   Lab Units 03/01/21  0518 02/28/21  0106   AST U/L 45 83*   ALT U/L 39 61   TOTAL BILIRUBIN mg/dL 0 50 1 00       Smokes a pipe  Assessment & Plan  · Patient smokes a pipe  Will be prescribed nicotine patch during hospitalization  · Counseled for greater than 11 minutes on smoking cessation    Hypertension  Assessment & Plan  · History of hypertension not on medications    Hyperlipidemia  Assessment & Plan  · Start high-intensity statin therapy, Lipitor 80 mg    Hyperglycemia due to type 2 diabetes mellitus [facility])  Assessment & Plan  Lab Results   Component Value Date    HGBA1C 7 5 (H) 02/28/2021     Recent Labs     02/28/21  2104 03/01/21  0725 03/01/21  1225 03/01/21  1546   POCGLU 158* 163* 136 228*     · Diabetes mellitus on 70/30, actos and glipizide  No longer metformin  · Will continue 70/30 insulin with sliding scale    · Will need strict glycemic control    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  Assessment & Plan  · Osteoporosis on fosamax weekly with ergocalciferol    * NSTEMI (non-ST elevated myocardial infarction) ([facility] [address] )  Assessment & Plan  · Type 1 myocardial infarction with troponin greater than 40  · He has significant 3 vessel coronary artery disease  · Due to his comorbidities is not considered a CABG candidate  · Underwent PCI to the LAD  Severely reduced ejection fraction with a EF of 22%  Will need LifeVest at discharge  Results from last 7 days   Lab Units 03/01/21  0518 02/28/21  1809 02/28/21  1416 02/28/21  0821 02/28/21  0405 02/28/21  0106   TROPONIN I ng/mL 30 86* 31 55* 33 98* 38 91* 38 97* >40 00*         [facility] Progress Note  Patient: Valerie Sarkar 68 y o  male   MRN: [id number]  PCP: Nga Dykes MD  Unit/Bed#: E4 -01 Encounter: [id number]  Date Of Visit: 21    Assessment:    Principal Problem:    NSTEMI (non-ST elevated myocardial infarction) ([facility] )  Active Problems:    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    Hyperglycemia due to type 2 diabetes mellitus ([facility] )    Hyperlipidemia    Hypertension    Smokes a pipe    Transaminitis      Plan:    · Continue current medical managed  · Start Lipitor 80 mg  · Hopeful for discharge within the next 24 hours       VTE Pharmacologic Prophylaxis:   Pharmacologic: Heparin Drip  Mechanical VTE Prophylaxis in Place: Yes    Patient Centered Rounds: I have performed bedside rounds with nursing staff today  Discussions with Specialists or Other Care Team Provider:  Cardiology    Education and Discussions with Family / Patient:  Brother    Time Spent for Care: 45 minutes  More than 50% of total time spent on counseling and coordination of care as described above  Current Length of Stay: 1 day(s)    Current Patient Status: Inpatient   Certification Statement: The patient will continue to require additional inpatient hospital stay due to Acute coronary syndrome with stent placement    Discharge Plan / Estimated Discharge Date:  48 hours    Code Status: Level 1 - Full Code      Subjective:   Patient seen and examined, reports that he is hungry  Denies any chest pain    A complete and comprehensive 14 point organ system review has been performed and all other systems are negative other than stated above  Objective:     Vitals:   Temp (24hrs), Av 9 °F (36 6 °C), Min:97 7 °F (36 5 °C), Max:98 3 °F (36 8 °C)    Temp:  [97 7 °F (36 5 °C)-98 3 °F (36 8 °C)] 98 3 °F (36 8 °C)  HR:  [68-90] 78  Resp:  [18] 18  BP: ()/(56-70) 107/59  SpO2:  [94 %-97 %] 97 %  There is no height or weight on file to calculate BMI       Input and Output Summary (last 24 hours): Intake/Output Summary (Last 24 hours) at 3/1/2021 1559  Last data filed at 3/1/2021 1501  Gross per 24 hour   Intake --   Output 2100 ml   Net -2100 ml       Physical Exam:     General: well appearing, no acute distress  HEENT: atraumatic, PERRLA, moist mucosa, normal pharynx, normal tonsils and adenoids, normal tongue, no fluid in sinuses  Neck: Trachea midline, no carotid bruit, no masses  Respiratory: normal chest wall expansion, CTA B, no r/r/w, no rubs  Cardiovascular: RRR, no m/r/g, Normal S1 and S2  Abdomen: Soft, non-tender, non-distended, normal bowel sounds in all quadrants, no hepatosplenomegaly, no tympany  Rectal: deferred  Musculoskeletal: normal ROM in upper and lower extremities  Integumentary: warm, dry, and pink, with no rash, purpura, or petechia  Heme/Lymph: no lymphadenopathy, no bruises  Neurological: Cranial Nerves II-XII grossly intact, no tics, normal sensation to pressure and light touch  Psychiatric: cooperative with normal mood, affect, and cognition      Additional Data:     Labs:    Results from last 7 days   Lab Units 03/01/21  0518 02/28/21  0106   WBC Thousand/uL 7 02 7 15   HEMOGLOBIN g/dL 12 6 13 7   HEMATOCRIT % 38 3 44 0   PLATELETS Thousands/uL 210 231   NEUTROS PCT %  --  75   LYMPHS PCT %  --  13*   MONOS PCT %  --  11   EOS PCT %  --  1     Results from last 7 days   Lab Units 03/01/21  0518   POTASSIUM mmol/L 4 3   CHLORIDE mmol/L 103   CO2 mmol/L 26   BUN mg/dL 31*   CREATININE mg/dL 1 19   CALCIUM mg/dL 9 2   ALK PHOS U/L 99   ALT U/L 39   AST U/L 45     Results from last 7 days   Lab Units 02/28/21  0106   INR  1 17       * I Have Reviewed All Lab Data Listed Above  * Additional Pertinent Lab Tests Reviewed:  [facility] Admission Reviewed    Imaging:    Imaging Reports Reviewed Today Include:  Cardiac catheterization report  Imaging Personally Reviewed by Myself Includes:  Cardiac catheterization    Recent Cultures (last 7 days):           Last 24 Hours Medication List:   Current Facility-Administered Medications   Medication Dose Route Frequency Provider Last Rate    acetaminophen  650 mg Oral Q6H PRN Kim mcguire, DO      aspirin  81 mg Oral Daily Cade Rahman, DO      atorvastatin  80 mg Oral Daily With Dinner Chacorta Barrera, DO      cholecalciferol  1,000 Units Oral Daily Kim mcguire, DO      vitamin B-12  1,000 mcg Oral Daily Cade Rahman, DO      insulin aspart protamine-insulin aspart  15 Units Subcutaneous BID AC Cade Rahman, DO      insulin lispro  1-6 Units Subcutaneous 4x Daily (AC & HS) Great shayla, DO      magnesium hydroxide  30 mL Oral BID PRN Kim mcguire, DO      morphine injection  2 mg Intravenous Q4H PRN Kim mcguire, DO      nicotine  1 patch Transdermal Daily Kim mcguire, DO      nitroglycerin  0 4 mg Sublingual Q5 Min PRN Kim mcguire, DO      ondansetron  4 mg Intravenous Q6H PRN Kim mcguire, DO      oxyCODONE  5 mg Oral Q4H PRN Kim mcguire, DO      ticagrelor  90 mg Oral Q12H 8000 Susie Lopez,           Today, Patient Was Seen By: George Malin DO    ** Please Note: This note has been constructed using a voice recognition system   **

## 2021-03-01 NOTE — ASSESSMENT & PLAN NOTE
· Transaminitis improved  · Hepatitis C negative  · Outpatient workup    Results from last 7 days   Lab Units 03/01/21  0518 02/28/21  0106   AST U/L 45 83*   ALT U/L 39 61   TOTAL BILIRUBIN mg/dL 0 50 1 00

## 2021-03-01 NOTE — PROGRESS NOTES
CARDIOLOGY INPATIENT FOLLOW-UP PROGRESS NOTE  *-*-*-*-*-*-*-*-*-*-*-*-*-*-*-*-*-*-*-*-*-*-*-*-*-*-*-*-*-*-*-*-*-*-*-*-*-*-*-*-*-*-*-*-*-*-*-*-*-*-*-*-*-*-  [id number] DATE: 03/01/21 2:18 PM   AUTHOR: Sharona Parisi MD  PATIENT: Sung Banks   1940    [id number]   67 y o    male  INPATIENT HOSPITALIST PHYSICIAN: Amaury Ware DO  DATE OF ADMISSION: 2/28/2021  7:35 AM; LENGTH OF STAY: 1 days  *-*-*-*-*-*-*-*-*-*-*-*-*-*-*-*-*-*-*-*-*-*-*-*-*-*-*-*-*-*-*-*-*-*-*-*-*-*-*-*-*-*-*-*-*-*-*-*-*-*-*-*-*-*-    CARDIOLOGY ASSESSMENT  1  Type I MI- ACS-NSTEMI  2  Severe 3 vessel CAD, POD 0 S/P PCI of LAD   3  Cardiomyopathy with severely reduced LV systolic function, EF 64%  4  HFrEF and grade III diastolic dysfunction  5  DM, insulin dependent  6  Ventricular tachycardia, nonsustained    Patient is noted to have severe three-vessel coronary artery disease however has very poor targets and is not a candidate for undergoing CABG  PCI has been performed in LAD and medical management to advised  Patient Active Problem List   Diagnosis    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    Decreased testosterone level    Hyperglycemia due to type 2 diabetes mellitus ([facility] [address] )    Hyperlipidemia    Vitamin D deficiency    Hypertension    NSTEMI (non-ST elevated myocardial infarction) ([facility] [address] )    Smokes a pipe    Transaminitis      Discussed the findings of cardiac catheterization with the patient and the recommendations regarding medical management   Also impressed upon him the importance of quitting smoking pipe  Also discussed with him findings of his echocardiogram and recommendation for life vest for primary  Prevention  PLAN:  - we will continue current medical therapy with plan to add beta-blocker metoprolol succinate 12 5 mg once daily and titrate as tolerated    -- will initiate process for arrangement for Lifevest   -- Optimization of diabetes regimen is advised  *-*-*-*-*-*-*-*-*-*-*-*-*-*-*-*-*-*-*-*-*-*-*-*-*-*-*-*-*-*-*-*-*-*-*-*-*-*-*-*-*-*-*-*-*-*-*-*-*-*-*-*-*-*-  INTERVAL CHANGES / HISTORY OF PRESENT ILLNESS:  No acute events  Denies chest pain or shortness of breath  Cardaic cath and echo completed  *-*-*-*-*-*-*-*-*-*-*-*-*-*-*-*-*-*-*-*-*-*-*-*-*-*-*-*-*-*-*-*-*-*-*-*-*-*-*-*-*-*-*-*-*-*-*-*-*-*-*-*-*-*  REVIEW OF SYMPTOMS:    Positive for:  No recurrence of chest pain  Negative for: All remaining as reviewed below and in HPI  SYSTEM SYMPTOMS REVIEWED:  General--weight change, fever, night sweats  Respiratoryl-- Wheezing, shortness of breath, cough, URI symptoms, sputum, blood  Cardiovascular--chest pain, syncope, dyspnea on exertion, edema, decline in exercise tolerance, claudication   Gastrointestinal--persistent vomiting, diarrhea, abdominal distention, blood in stool   Muscular or skeletal--joint pain or swelling   Neurologic--headaches, syncope, abnormal movement  Hematologic--history of easy bruising and bleeding   Endocrine--thyroid enlargement, heat or cold intolerance, polyuria   Psychiatric--anxiety, depression      *-*-*-*-*-*-*-*-*-*-*-*-*-*-*-*-*-*-*-*-*-*-*-*-*-*-*-*-*-*-*-*-*-*-*-*-*-*-*-*-*-*-*-*-*-*-*-*-*-*-*-*-*-*-  VITAL SIGNS:  Vitals:    21 1230 21 1245 21 1300 21 1330   BP: 110/62 112/61 115/66 121/63   BP Location:       Pulse: 87 72 80 68   Resp:       Temp:       TempSrc:       SpO2:          Temp (24hrs), Av 8 °F (36 6 °C), Min:97 7 °F (36 5 °C), Max:98 °F (36 7 °C)  Current: Temperature: 98 °F (36 7 °C)    Weight (last 2 days)     None        There is no height or weight on file to calculate BMI   Wt Readings from Last 3 Encounters:   21 81 6 kg (179 lb 14 3 oz)   18 84 8 kg (187 lb)   18 84 4 kg (186 lb)      Intake/Output Summary (Last 24 hours) at 3/1/2021 1418  Last data filed at 3/1/2021 1409  Gross per 24 hour   Intake --   Output 1800 ml   Net -1800 ml *-*-*-*-*-*-*-*-*-*-*-*-*-*-*-*-*-*-*-*-*-*-*-*-*-*-*-*-*-*-*-*-*-*-*-*-*-*-*-*-*-*-*-*-*-*-*-*-*-*-*-*-*-*-  PHYSICAL EXAM:  General Appearance:    Alert, cooperative, no distress, appears stated age   Head, Eyes, ENT:    No obvious abnormality, moist mucous mebranes  Neck:   Supple, no carotid bruit or JVD   Back:     Symmetric, no curvature  Lungs:     Respirations unlabored  Clear to auscultation bilaterally,    Chest wall:    No tenderness or deformity   Heart:    Regular rate and rhythm, S1 and S2 normal, no murmur, rub  or gallop  Abdomen:     Soft, non-tender, No obvious masses, or organomegaly   Extremities:   Extremities somewhat cool to touch  R arm radial band present  Skin:   Skin color, texture, turgor normal, no rashes or lesions     *-*-*-*-*-*-*-*-*-*-*-*-*-*-*-*-*-*-*-*-*-*-*-*-*-*-*-*-*-*-*-*-*-*-*-*-*-*-*-*-*-*-*-*-*-*-*-*-*-*-*-*-*-*-  TELEMETRY, LAST ECG:  Telemetry reviewed   13 beat NSVT yesterday  Now in consistent sinus rhythm with rare PVCs  Narrow native QRS  No results found for this visit on 02/28/21     *-*-*-*-*-*-*-*-*-*-*-*-*-*-*-*-*-*-*-*-*-*-*-*-*-*-*-*-*-*-*-*-*-*-*-*-*-*-*-*-*-*-*-*-*-*-*-*-*-*-*-*-*-*-    CURRENT SCHEDULED MEDICATIONS:    Current Facility-Administered Medications:     acetaminophen (TYLENOL) tablet 650 mg, 650 mg, Oral, Q6H PRN, Priyanka Guerrero DO    aspirin chewable tablet 81 mg, 81 mg, Oral, Daily, Cade Rahman DO, 81 mg at 03/01/21 9482    cholecalciferol (VITAMIN D3) tablet 1,000 Units, 1,000 Units, Oral, Daily, Priyanka Guerrero DO, 1,000 Units at 03/01/21 3017    cyanocobalamin (VITAMIN B-12) tablet 1,000 mcg, 1,000 mcg, Oral, Daily, Cade Rahman DO, 1,000 mcg at 03/01/21 8477    insulin aspart protamine-insulin aspart (NovoLOG 70/30) 100 units/mL subcutaneous injection 15 Units, 15 Units, Subcutaneous, BID AC, Cade Rahman DO, 15 Units at 02/28/21 1713    insulin lispro (HumaLOG) 100 units/mL subcutaneous injection 1-6 Units, 1-6 Units, Subcutaneous, 4x Daily (AC & HS), 1 Units at 02/28/21 2106 **AND** Fingerstick Glucose (POCT), , , 4x Daily AC and at bedtime, Jodie Stalls, DO    magnesium hydroxide (MILK OF MAGNESIA) oral suspension 30 mL, 30 mL, Oral, BID PRN, Jodie Stalls, DO    morphine injection 2 mg, 2 mg, Intravenous, Q4H PRN, Cade Rahman DO    nicotine (NICODERM CQ) 7 mg/24hr TD 24 hr patch 1 patch, 1 patch, Transdermal, Daily, Cade Rahman DO, 1 patch at 03/01/21 0925    nitroglycerin (NITROSTAT) SL tablet 0 4 mg, 0 4 mg, Sublingual, Q5 Min PRN, Cade Rahman DO    ondansetron (ZOFRAN) injection 4 mg, 4 mg, Intravenous, Q6H PRN, Cade Rahman, , 4 mg at 02/28/21 1804    oxyCODONE (ROXICODONE) IR tablet 5 mg, 5 mg, Oral, Q4H PRN, Cisco Stalls, DO    ticagrelor (BRILINTA) tablet 90 mg, 90 mg, Oral, Q12H [address], Angelia Hand, DO ALLERGIES:  Allergies   Allergen Reactions    Metformin Diarrhea        *-*-*-*-*-*-*-*-*-*-*-*-*-*-*-*-*-*-*-*-*-*-*-*-*-*-*-*-*-*-*-*-*-*-*-*-*-*-*-*-*-*-*-*-*-*-*-*-*-*-*-*-*-*  LABORATORY DATA:  I have personally reviewed pertinent labs  CMP:  Results from last 7 days   Lab Units 03/01/21  0518 02/28/21  0106   SODIUM mmol/L 135* 137   POTASSIUM mmol/L 4 3 4 5   CHLORIDE mmol/L 103 101   CO2 mmol/L 26 24   BUN mg/dL 31* 25   CREATININE mg/dL 1 19 1 16   CALCIUM mg/dL 9 2 9 0   ALK PHOS U/L 99 120*   ALT U/L 39 61   AST U/L 45 83*               Cardiac Profile:   Results from last 7 days   Lab Units 03/01/21  0518 02/28/21  1809 02/28/21  1416  02/28/21  0106   TROPONIN I ng/mL 30 86* 31 55* 33 98*   < > >40 00*   CK MB ng/mL  --   --   --   --  31 0*   NT-PRO BNP pg/mL  --   --   --   --  5,602*    < > = values in this interval not displayed       Results from last 7 days   Lab Units 02/28/21  1416   HEMOGLOBIN A1C % 7 5*            CBC:   Results from last 7 days   Lab Units 03/01/21  0518 02/28/21  0106   WBC Thousand/uL 7 02 7 15   HEMOGLOBIN g/dL 12 6 13 7   HEMATOCRIT % 38 3 44 0   PLATELETS Thousands/uL 210 231     PT/INR: No results found for: PT, INR, Microbiology:          *-*-*-*-*-*-*-*-*-*-*-*-*-*-*-*-*-*-*-*-*-*-*-*-*-*-*-*-*-*-*-*-*-*-*-*-*-*-*-*-*-*-*-*-*-*-*-*-*-*-*-*-*-*-  IMAGING STUDIES REPORTS: Imaging studies results reviewed    No procedure found  No Chest XR results available for this patient  *-*-*-*-*-*-*-*-*-*-*-*-*-*-*-*-*-*-*-*-*-*-*-*-*-*-*-*-*-*-*-*-*-*-*-*-*-*-*-*-*-*-*-*-*-*-*-*-*-*-*-*-*-*-  ECHOCARDIOGRAM AND OTHER CARDIAC TESTS RESULTS:  Results for orders placed during the hospital encounter of 21   Echo complete with contrast if indicated    Narrative [address]  [address]  [place], [address]  [phone]    Transthoracic Echocardiogram  2D, M-mode, Doppler, and Color Doppler    Study date:  01-Mar-2021    Patient: Felix Chen  MR number: [id number]  Account number: [de-identified]  : 1940  Age: 68 years  Gender: Male  Status: Inpatient  Location: Bedside  Height: 70 in  Weight: 178 6 lb  BP: 106/ 56 mmHg    Indications: Acute MI    Diagnoses: I21 4 - Non-ST elevation (NSTEMI) myocardial infarction    Sonographer:  Angi Velarde RDCS  Primary Physician:  Dimas Joseph MD  Referring Physician:  Lorenzo Singh DO  Group:  [facility]  Interpreting Physician:  Bharti Leal MD    IMPRESSIONS:  Technical quality: Fair but adequate  Slightly suboptimal parasternal windows  Cardiac rhythm: Sinus    1  Normal left ventricular cavity size, markedly reduced left ventricular systolic function with regional wall motion abnormality  Ejection fraction is estimated as around 24 percent  Grade 3 diastolic dysfunction with elevated left  ventricular filling pressures  2  Normal right ventricular size and systolic function  3  Mild left atrial and borderline right atrial cavity enlargement    4  Aortic valve sclerosis with some focal calcification, trace aortic valve regurgitation  5  Mitral annular calcification and mitral valve sclerosis with some focal calcification, mild mitral valve regurgitation  6  Trace tricuspid valve regurgitation  7  No obvious pulmonary hypertension  8  No pericardial effusion  No previous echocardiogram is available for comparison  SUMMARY    LEFT VENTRICLE:  Normal left ventricular cavity size, normal wall thickness, severely reduced left ventricular systolic function  There is akinesis of the basal to mid inferior wall, sinus and severe hypokinesis of the entire inferolateral wall and  inferoseptal wall  Ejection fraction is estimated as around 24 percent  Grade 3 diastolic dysfunction, restrictive left ventricular filling pattern  Estimated left ventricular filling pressure is increased  RIGHT VENTRICLE:  Normal right ventricular size and systolic function  Normal estimated right ventricular systolic pressure, 29 mmHg  LEFT ATRIUM:  Mild left atrial cavity enlargement  Intact interatrial septum  RIGHT ATRIUM:  Borderline right atrial cavity enlargement  MITRAL VALVE:  Mitral annular calcification and mitral valve leaflet sclerosis with some focal calcification on anterior mitral valve leaflet  Adequate leaflet mobility  There is mild mitral valve regurgitation  No mitral valve stenosis  AORTIC VALVE:  Tricuspid aortic valve with sclerosis and some focal calcification, adequate cuspal separation  No aortic valve stenosis  Trace aortic valve regurgitation noted  TRICUSPID VALVE:  Trace tricuspid valve regurgitation  PULMONIC VALVE:  No significant pulmonic valve regurgitation  AORTA:  Aortic root and proximal ascending aorta are normal in size on 2D imaging  IVC, HEPATIC VEINS:  Inferior vena cava is normal in size and demonstrates appropriate respiratory phasic changes in diameter  PERICARDIUM:  No pericardial effusion  SUMMARY MEASUREMENTS  2D measurements:  Unspecified Anatomy:   %FS was 10 3 %    Ao Diam was 3 cm  EDV(Teich) was 97 7 ml   EF Biplane was 22 6 %  EF(Teich) was 22 5 %  ESV(Teich) was 75 7 ml  IVSd was 1 1 cm  LA Diam was 4 3 cm  LAAs A4C was 21 6 cm2  LAESV A-L A4C was 66  ml  LAESV MOD A4C was 59 8 ml  LALs A4C was 6 cm  LVEDV MOD A2C was 109 ml  LVEDV MOD A4C was 91 ml  LVEDV MOD BP was 102 1 ml  LVEDVInd MOD BP was 51 3 ml/m2  LVEF MOD A2C was 24 2 %  LVEF MOD A4C was 17 9 %  LVESV MOD A2C was  82 7 ml  LVESV MOD A4C was 74 7 ml  LVESV MOD BP was 79 ml  LVESVInd MOD BP was 39 7 ml/m2  LVIDd was 4 6 cm  LVIDs was 4 1 cm  LVLd A2C was 8 5 cm  LVLd A4C was 8 1 cm  LVLs A2C was 8 cm  LVLs A4C was 7 9 cm  LVPWd was 1 cm  Billy A4C was 17 cm2  RAEDV A-L was 46 2 ml  RAEDV MOD was 45 6 ml  RALd was 5 3 cm  RVIDd was 3 4 cm  SV MOD A2C was 26 4 ml   SV MOD A4C was 16 3 ml   SV(Teich) was 22 ml   CW measurements:  Unspecified Anatomy:   AV Env  Ti was 279 7 ms   AV VTI was 15 7 cm  AV Vmax was 0 8 m/s  AV Vmean was 0 6 m/s  AV maxPG was 2 8 mmHg  AV meanPG was 1 5 mmHg  TR Vmax was 2 2 m/s   TR maxPG was 19 2 mmHg  MM measurements:  Unspecified Anatomy:   TAPSE was 1 7 cm  PW measurements:  Unspecified Anatomy:   DVI was 0 9   E' Sept was 0 m/s  E/E' Sept was 21 5   LVOT Env  Ti was 324 8 ms  LVOT VTI was 13 6 cm  LVOT Vmax was 0 6 m/s  LVOT Vmean was 0 4 m/s  LVOT maxPG was 1 4 mmHg  LVOT meanPG was 0 8 mmHg  MV A  Neel was 0 3 m/s  MV Dec [place] was 6 1 m/s2  MV DecT was 158 ms  MV E Neel was 1 m/s  MV E/A Ratio was 2 9   MV PHT was 45 8 ms  MVA By PHT was 4 8 cm2  HISTORY: PRIOR HISTORY: HLD, HTN, DM, NSTEMI    PROCEDURE: The procedure was performed at the bedside  This was a stat study  The transthoracic approach was used  The study included complete 2D imaging, M-mode, complete spectral Doppler, and color Doppler  The heart rate was 79 bpm, at  the start of the study   Images were obtained from the parasternal, apical, subcostal, and suprasternal notch acoustic windows  Image quality was adequate  LEFT VENTRICLE: Normal left ventricular cavity size, normal wall thickness, severely reduced left ventricular systolic function  There is akinesis of the basal to mid inferior wall, sinus and severe hypokinesis of the entire inferolateral  wall and inferoseptal wall  Ejection fraction is estimated as around 24 percent  Grade 3 diastolic dysfunction, restrictive left ventricular filling pattern  Estimated left ventricular filling pressure is increased  RIGHT VENTRICLE: Normal right ventricular size and systolic function  Normal estimated right ventricular systolic pressure, 29 mmHg  LEFT ATRIUM: Mild left atrial cavity enlargement  Intact interatrial septum  RIGHT ATRIUM: Borderline right atrial cavity enlargement  MITRAL VALVE: Mitral annular calcification and mitral valve leaflet sclerosis with some focal calcification on anterior mitral valve leaflet  Adequate leaflet mobility  There is mild mitral valve regurgitation  No mitral valve stenosis  AORTIC VALVE: Tricuspid aortic valve with sclerosis and some focal calcification, adequate cuspal separation  No aortic valve stenosis  Trace aortic valve regurgitation noted  TRICUSPID VALVE: Trace tricuspid valve regurgitation  PULMONIC VALVE: No significant pulmonic valve regurgitation  PERICARDIUM: No pericardial effusion  AORTA: Aortic root and proximal ascending aorta are normal in size on 2D imaging  SYSTEMIC VEINS: IVC: Inferior vena cava is normal in size and demonstrates appropriate respiratory phasic changes in diameter      SYSTEM MEASUREMENT TABLES    2D  %FS: 10 3 %  Ao Diam: 3 cm  EDV(Teich): 97 7 ml  EF Biplane: 22 6 %  EF(Teich): 22 5 %  ESV(Teich): 75 7 ml  IVSd: 1 1 cm  LA Diam: 4 3 cm  LAAs A4C: 21 6 cm2  LAESV A-L A4C: 66 ml  LAESV MOD A4C: 59 8 ml  LALs A4C: 6 cm  LVEDV MOD A2C: 109 ml  LVEDV MOD A4C: 91 ml  LVEDV MOD BP: 102 1 ml  LVEDVInd MOD BP: 51 3 ml/m2  LVEF MOD A2C: 24 2 %  LVEF MOD A4C: 17 9 %  LVESV MOD A2C: 82 7 ml  LVESV MOD A4C: 74 7 ml  LVESV MOD BP: 79 ml  LVESVInd MOD BP: 39 7 ml/m2  LVIDd: 4 6 cm  LVIDs: 4 1 cm  LVLd A2C: 8 5 cm  LVLd A4C: 8 1 cm  LVLs A2C: 8 cm  LVLs A4C: 7 9 cm  LVPWd: 1 cm  Billy A4C: 17 cm2  RAEDV A-L: 46 2 ml  RAEDV MOD: 45 6 ml  RALd: 5 3 cm  RVIDd: 3 4 cm  SV MOD A2C: 26 4 ml  SV MOD A4C: 16 3 ml  SV(Teich): 22 ml    CW  AV Env  Ti: 279 7 ms  AV VTI: 15 7 cm  AV Vmax: 0 8 m/s  AV Vmean: 0 6 m/s  AV maxP 8 mmHg  AV meanP 5 mmHg  TR Vmax: 2 2 m/s  TR maxP 2 mmHg    MM  TAPSE: 1 7 cm    PW  DVI: 0 9  E' Sept: 0 m/s  E/E' Sept: 21 5  LVOT Env  Ti: 324 8 ms  LVOT VTI: 13 6 cm  LVOT Vmax: 0 6 m/s  LVOT Vmean: 0 4 m/s  LVOT maxP 4 mmHg  LVOT meanP 8 mmHg  MV A Neel: 0 3 m/s  MV Dec [place]: 6 1 m/s2  MV DecT: 158 ms  MV E Neel: 1 m/s  MV E/A Ratio: 2 9  MV PHT: 45 8 ms  MVA By PHT: 4 8 cm2    Inters\[facility] [place] [facility]\"" Commission Accredited Echocardiography Laboratory    Prepared and electronically signed by    Pranay Huang MD  Signed 01-Mar-2021 10:45:22       No rCath:  SUMMARY     CORONARY CIRCULATION:  Left main: Normal   Proximal LAD: There was a 90 % stenosis  Mid LAD: There was a 90 % stenosis  Mid circumflex: There was a 100 % stenosis  This lesion is a chronic total occlusion  Distal RCA: There was a 100 % stenosis  This lesion is a likely culprit for the patient's recent myocardial infarction 5 days ago  Distal vessel poor target/extremely small      HEMODYNAMICS:  Hemodynamic assessment demonstrated moderately to severely elevated LVEDP      1ST LESION INTERVENTIONS:  A balloon angioplasty procedure was performed on the 90 % lesion in the proximal LAD  Following intervention there was an excellent angiographic appearance with a 0 % residual stenosis  A [employer] Rx 3 0 x 15mm drug-eluting stent was placed across the lesion and deployed at a maximum inflation pressure of 12 rain      IMPRESSIONS:  3V CAD, with completely occluded LCX and RCA poor distal targets for CABG  RCA culprit for MI (symptomatically 5 days ago)  Proceeded with PCI LAD with MOSHE     RECOMMENDATIONS  GDMT CAD  LCX occluded and too small distally meaningful revascularization/no target cabg   RCA culprit for MI 5 days ago(too late now in presentation consider pci)     *-*-*-*-*-*-*-*-*-*-*-*-*-*-*-*-*-*-*-*-*-*-*-*-*-*-*-*-*-*-*-*-*-*-*-*-*-*-*-*-*-*-*-*-*-*-*-*-*-*-*-*-*-*-  SIGNATURES:   [unfilled]   Bruce Carney MD

## 2021-03-01 NOTE — PHYSICAL THERAPY NOTE
PT Cancellation Note  PT orders received and chart reviewed  Pt with elevated troponin and may possibly undergo a cardiac cath today  Will perform PT eval when medically stable and appropriate     Geovany Lowry, PT

## 2021-03-01 NOTE — OCCUPATIONAL THERAPY NOTE
OCCUPATIONAL THERAPY CANCELLATION     OT orders received and chart reviewed  Pt with elevated troponin and may possibly undergo a cardiac cath today  Will perform OT eval when medically stable and appropriate       Gerson Watts MS, OTR/L

## 2021-03-01 NOTE — ASSESSMENT & PLAN NOTE
· Patient smokes a pipe    Will be prescribed nicotine patch during hospitalization  · Counseled for greater than 11 minutes on smoking cessation

## 2021-03-01 NOTE — PLAN OF CARE
Problem: Potential for Falls  Goal: Patient will remain free of falls  Description: INTERVENTIONS:  - Assess patient frequently for physical needs  -  Identify cognitive and physical deficits and behaviors that affect risk of falls    -  [place] fall precautions as indicated by assessment   - Educate patient/family on patient safety including physical limitations  - Instruct patient to call for assistance with activity based on assessment  - Modify environment to reduce risk of injury  - Consider OT/PT consult to assist with strengthening/mobility  Outcome: Progressing

## 2021-03-01 NOTE — ASSESSMENT & PLAN NOTE
Lab Results   Component Value Date    HGBA1C 7 5 (H) 02/28/2021     Recent Labs     02/28/21  2104 03/01/21  0725 03/01/21  1225 03/01/21  1546   POCGLU 158* 163* 136 228*     · Diabetes mellitus on 70/30, actos and glipizide  No longer metformin  · Will continue 70/30 insulin with sliding scale    · Will need strict glycemic control

## 2021-03-02 LAB
APTT PPP: 27 SECONDS (ref 23–37)
GLUCOSE SERPL-MCNC: 210 MG/DL (ref 65–140)
GLUCOSE SERPL-MCNC: 221 MG/DL (ref 65–140)
GLUCOSE SERPL-MCNC: 254 MG/DL (ref 65–140)

## 2021-03-02 PROCEDURE — 82948 REAGENT STRIP/BLOOD GLUCOSE: CPT

## 2021-03-02 PROCEDURE — 97116 GAIT TRAINING THERAPY: CPT

## 2021-03-02 PROCEDURE — 97535 SELF CARE MNGMENT TRAINING: CPT

## 2021-03-02 PROCEDURE — 99232 SBSQ HOSP IP/OBS MODERATE 35: CPT | Performed by: INTERNAL MEDICINE

## 2021-03-02 PROCEDURE — 97163 PT EVAL HIGH COMPLEX 45 MIN: CPT

## 2021-03-02 PROCEDURE — 85730 THROMBOPLASTIN TIME PARTIAL: CPT | Performed by: INTERNAL MEDICINE

## 2021-03-02 PROCEDURE — 97167 OT EVAL HIGH COMPLEX 60 MIN: CPT

## 2021-03-02 PROCEDURE — 99407 BEHAV CHNG SMOKING > 10 MIN: CPT | Performed by: INTERNAL MEDICINE

## 2021-03-02 RX ORDER — METOPROLOL SUCCINATE 25 MG/1
12.5 TABLET, EXTENDED RELEASE ORAL DAILY
Status: DISCONTINUED | OUTPATIENT
Start: 2021-03-02 | End: 2021-03-03 | Stop reason: HOSPADM

## 2021-03-02 RX ORDER — INSULIN ASPART 100 [IU]/ML
20 INJECTION, SUSPENSION SUBCUTANEOUS
Status: DISCONTINUED | OUTPATIENT
Start: 2021-03-02 | End: 2021-03-03 | Stop reason: HOSPADM

## 2021-03-02 RX ORDER — HEPARIN SODIUM 5000 [USP'U]/ML
5000 INJECTION, SOLUTION INTRAVENOUS; SUBCUTANEOUS EVERY 8 HOURS SCHEDULED
Status: DISCONTINUED | OUTPATIENT
Start: 2021-03-02 | End: 2021-03-03 | Stop reason: HOSPADM

## 2021-03-02 RX ADMIN — INSULIN LISPRO 2 UNITS: 100 INJECTION, SOLUTION INTRAVENOUS; SUBCUTANEOUS at 18:24

## 2021-03-02 RX ADMIN — CYANOCOBALAMIN TAB 500 MCG 1000 MCG: 500 TAB at 08:47

## 2021-03-02 RX ADMIN — ATORVASTATIN CALCIUM 80 MG: 80 TABLET, FILM COATED ORAL at 18:24

## 2021-03-02 RX ADMIN — TICAGRELOR 90 MG: 90 TABLET ORAL at 08:47

## 2021-03-02 RX ADMIN — NICOTINE 7 MG/24 HR DAILY TRANSDERMAL PATCH 1 PATCH: at 08:53

## 2021-03-02 RX ADMIN — INSULIN LISPRO 2 UNITS: 100 INJECTION, SOLUTION INTRAVENOUS; SUBCUTANEOUS at 22:14

## 2021-03-02 RX ADMIN — INSULIN LISPRO 3 UNITS: 100 INJECTION, SOLUTION INTRAVENOUS; SUBCUTANEOUS at 12:37

## 2021-03-02 RX ADMIN — Medication 1000 UNITS: at 08:47

## 2021-03-02 RX ADMIN — INSULIN ASPART 15 UNITS: 100 INJECTION, SUSPENSION SUBCUTANEOUS at 08:52

## 2021-03-02 RX ADMIN — METOPROLOL SUCCINATE 12.5 MG: 25 TABLET, EXTENDED RELEASE ORAL at 13:36

## 2021-03-02 RX ADMIN — TICAGRELOR 90 MG: 90 TABLET ORAL at 22:14

## 2021-03-02 RX ADMIN — INSULIN LISPRO 2 UNITS: 100 INJECTION, SOLUTION INTRAVENOUS; SUBCUTANEOUS at 08:48

## 2021-03-02 RX ADMIN — HEPARIN SODIUM 5000 UNITS: 5000 INJECTION INTRAVENOUS; SUBCUTANEOUS at 22:14

## 2021-03-02 RX ADMIN — INSULIN ASPART 20 UNITS: 100 INJECTION, SUSPENSION SUBCUTANEOUS at 18:28

## 2021-03-02 RX ADMIN — ASPIRIN 81 MG CHEWABLE TABLET 81 MG: 81 TABLET CHEWABLE at 08:47

## 2021-03-02 NOTE — ASSESSMENT & PLAN NOTE
Lab Results   Component Value Date    HGBA1C 7 5 (H) 02/28/2021     Recent Labs     03/01/21  1859 03/01/21  2036 03/02/21  0739 03/02/21  1127   POCGLU 132 176* 221* 254*     · Diabetes mellitus on 70/30, actos and glipizide  · Increased to 20 units twice a day, from 15 units twice a day  · Will continue 70/30 insulin with sliding scale    · Will need strict glycemic control

## 2021-03-02 NOTE — PROGRESS NOTES
Progress Note - Cardiology   Melissa Mar 68 y o  male MRN: [id number]  Unit/Bed#: E4 -01 Encounter: [id number]        Problem List:  Principal Problem:    NSTEMI (non-ST elevated myocardial infarction) ([facility] )  Active Problems:    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    Hyperglycemia due to type 2 diabetes mellitus ([facility] )    Hyperlipidemia    Hypertension    Smokes a pipe    Transaminitis      Asessment:  1  Type I MI- ACS-NSTEMI  2  Severe 3 vessel CAD, POD 1 S/P PCI of LAD   3  Cardiomyopathy with severely reduced LV systolic function, EF 06%  4  HFrEF and grade III diastolic dysfunction  5  DM, insulin dependent  6  Ventricular tachycardia, nonsustained    Plan/ Discussion:  · He is feeling well today without any chest pain or shortness of breath  Reviewed the plan in detail with him and have arranged follow-up  · Continue dual anti-platelet therapy with aspirin and Brilinta for minimum of 1 year, stressed the importance of not missing any of these doses or he went to the risk of InStent thrombosis    · Will try to get him on a beta-blocker given multivessel CAD as well ischemic cardiomyopathy, start metoprolol succinate 12 5 mg today    · Continue high-dose atorvastatin    · Life Vest prior to discharge, ordered     · Follow-up in [place] office 3/9/21, advised out of work until seen in the office     Subjective:  Feels well  Wants to go home    Vitals:   There were no vitals filed for this visit ,  Vitals:    03/01/21 2100 03/01/21 2300 03/02/21 0300 03/02/21 0740   BP: 118/63 125/67 113/62 108/62   BP Location: Left arm Left arm Left arm Right arm   Pulse: 75 76 77 75   Resp: 17 18 18 18   Temp: 98 °F (36 7 °C) 97 8 °F (36 6 °C) 98 3 °F (36 8 °C) 97 6 °F (36 4 °C)   TempSrc: Temporal Temporal Temporal Temporal   SpO2:  94% 91% 90%       Exam:  General: Alert awake and oriented, no acute distress    Heart:  Regular rate and rhythm, no murmurs   Respiratory effort:  Breathing comfortably on room air Lungs:  Clear bilaterally without wheezing, rales, crackles   Lower Limbs:  No edema           Telemetry:       Normal sinus rhythm, , Heart Rate 80's    Medications:    Current Facility-Administered Medications:     acetaminophen (TYLENOL) tablet 650 mg, 650 mg, Oral, Q6H PRN, Robe Welch DO    aspirin chewable tablet 81 mg, 81 mg, Oral, Daily, Cade Rahman DO, 81 mg at 03/02/21 0847    atorvastatin (LIPITOR) tablet 80 mg, 80 mg, Oral, Daily With Dinner, Chacorta Barrera DO, 80 mg at 03/01/21 1638    cholecalciferol (VITAMIN D3) tablet 1,000 Units, 1,000 Units, Oral, Daily, Robe Welch DO, 1,000 Units at 03/02/21 0847    cyanocobalamin (VITAMIN B-12) tablet 1,000 mcg, 1,000 mcg, Oral, Daily, Cade Rahman DO, 1,000 mcg at 03/02/21 0847    insulin aspart protamine-insulin aspart (NovoLOG 70/30) 100 units/mL subcutaneous injection 15 Units, 15 Units, Subcutaneous, BID AC, Cade Rahman DO, 15 Units at 03/02/21 0852    insulin lispro (HumaLOG) 100 units/mL subcutaneous injection 1-6 Units, 1-6 Units, Subcutaneous, 4x Daily (AC & HS), 2 Units at 03/02/21 0848 **AND** Fingerstick Glucose (POCT), , , 4x Daily AC and at bedtime, Robe Welch DO    magnesium hydroxide (MILK OF MAGNESIA) oral suspension 30 mL, 30 mL, Oral, BID PRN, Robe Welch DO    morphine injection 2 mg, 2 mg, Intravenous, Q4H PRN, Cade Rahman DO    nicotine (NICODERM CQ) 7 mg/24hr TD 24 hr patch 1 patch, 1 patch, Transdermal, Daily, Cade Rahman DO, 1 patch at 03/02/21 0853    nitroglycerin (NITROSTAT) SL tablet 0 4 mg, 0 4 mg, Sublingual, Q5 Min PRN, Cade Rahman DO    ondansetron (ZOFRAN) injection 4 mg, 4 mg, Intravenous, Q6H PRN, Cade Rahman DO, 4 mg at 02/28/21 1804    oxyCODONE (ROXICODONE) IR tablet 5 mg, 5 mg, Oral, Q4H PRN, Robe Welch DO    ticagrelor (BRILINTA) tablet 90 mg, 90 mg, Oral, Q12H [facility], Mk Hudson DO, 90 mg at 03/02/21 5474      Labs/Data:        Results from last 7 days   Lab Units 03/01/21  0518 02/28/21  0106   WBC Thousand/uL 7 02 7 15   HEMOGLOBIN g/dL 12 6 13 7   HEMATOCRIT % 38 3 44 0   PLATELETS Thousands/uL 210 231     Results from last 7 days   Lab Units 03/01/21  0518 02/28/21  1809 02/28/21  1416  02/28/21  0106   POTASSIUM mmol/L 4 3  --   --   --  4 5   CHLORIDE mmol/L 103  --   --   --  101   CO2 mmol/L 26  --   --   --  24   BUN mg/dL 31*  --   --   --  25   CK TOTAL U/L  --   --   --   --  426*   TROPONIN I ng/mL 30 86* 31 55* 33 98*   < > >40 00*   CK MB INDEX %  --   --   --   --  7 3*    < > = values in this interval not displayed

## 2021-03-02 NOTE — ASSESSMENT & PLAN NOTE
· Started high-intensity statin therapy, Lipitor 80 mg given coronary artery disease and recent stent

## 2021-03-02 NOTE — PLAN OF CARE
Problem: OCCUPATIONAL THERAPY ADULT  Goal: Performs self-care activities at highest level of function for planned discharge setting  See evaluation for individualized goals  Description: Treatment Interventions: ADL retraining, Functional transfer training, UE strengthening/ROM, Endurance training, Equipment evaluation/education, Patient/family training, Fine motor coordination activities, Compensatory technique education, Activityengagement, Energy conservation, Cardiac education          See flowsheet documentation for full assessment, interventions and recommendations     Note: Limitation: Decreased ADL status, Decreased UE strength, Decreased Safe judgement during ADL, Decreased endurance, Decreased fine motor control, Decreased self-care trans, Decreased high-level ADLs  Prognosis: Fair  Assessment: see note     OT Discharge Recommendation: Post-Acute Rehabilitation Services  OT - OK to Discharge: Yes(to rehab when medically cleared)

## 2021-03-02 NOTE — PLAN OF CARE
Problem: Potential for Falls  Goal: Patient will remain free of falls  Description: INTERVENTIONS:  - Assess patient frequently for physical needs  -  Identify cognitive and physical deficits and behaviors that affect risk of falls  -  [place] fall precautions as indicated by assessment   - Educate patient/family on patient safety including physical limitations  - Instruct patient to call for assistance with activity based on assessment  - Modify environment to reduce risk of injury  - Consider OT/PT consult to assist with strengthening/mobility  Outcome: Progressing     Problem: Nutrition/Hydration-ADULT  Goal: Nutrient/Hydration intake appropriate for improving, restoring or maintaining nutritional needs  Description: Monitor and assess patient's nutrition/hydration status for malnutrition  Collaborate with interdisciplinary team and initiate plan and interventions as ordered  Monitor patient's weight and dietary intake as ordered or per policy  Utilize nutrition screening tool and intervene as necessary  Determine patient's food preferences and provide high-protein, high-caloric foods as appropriate       INTERVENTIONS:  - Monitor oral intake, urinary output, labs, and treatment plans  - Assess nutrition and hydration status and recommend course of action  - Evaluate amount of meals eaten  - Assist patient with eating if necessary   - Allow adequate time for meals  - Recommend/ encourage appropriate diets, oral nutritional supplements, and vitamin/mineral supplements  - Order, calculate, and assess calorie counts as needed  - Recommend, monitor, and adjust tube feedings and TPN/PPN based on assessed needs  - Assess need for intravenous fluids  - Provide specific nutrition/hydration education as appropriate  - Include patient/family/caregiver in decisions related to nutrition  Outcome: Progressing     Problem: PAIN - ADULT  Goal: Verbalizes/displays adequate comfort level or baseline comfort level  Description: Interventions:  - Encourage patient to monitor pain and request assistance  - Assess pain using appropriate pain scale  - Administer analgesics based on type and severity of pain and evaluate response  - Implement non-pharmacological measures as appropriate and evaluate response  - Consider cultural and social influences on pain and pain management  - Notify physician/advanced practitioner if interventions unsuccessful or patient reports new pain  Outcome: Progressing     Problem: INFECTION - ADULT  Goal: Absence or prevention of progression during hospitalization  Description: INTERVENTIONS:  - Assess and monitor for signs and symptoms of infection  - Monitor lab/diagnostic results  - Monitor all insertion sites, i e  indwelling lines, tubes, and drains  - Monitor endotracheal if appropriate and nasal secretions for changes in amount and color  - [place] appropriate cooling/warming therapies per order  - Administer medications as ordered  - Instruct and encourage patient and family to use good hand hygiene technique  - Identify and instruct in appropriate isolation precautions for identified infection/condition  Outcome: Progressing  Goal: Absence of fever/infection during neutropenic period  Description: INTERVENTIONS:  - Monitor WBC    Outcome: Progressing     Problem: SAFETY ADULT  Goal: Patient will remain free of falls  Description: INTERVENTIONS:  - Assess patient frequently for physical needs  -  Identify cognitive and physical deficits and behaviors that affect risk of falls    -  [place] fall precautions as indicated by assessment   - Educate patient/family on patient safety including physical limitations  - Instruct patient to call for assistance with activity based on assessment  - Modify environment to reduce risk of injury  - Consider OT/PT consult to assist with strengthening/mobility  Outcome: Progressing  Goal: Maintain or return to baseline ADL function  Description: INTERVENTIONS:  -  Assess patient's ability to carry out ADLs; assess patient's baseline for ADL function and identify physical deficits which impact ability to perform ADLs (bathing, care of mouth/teeth, toileting, grooming, dressing, etc )  - Assess/evaluate cause of self-care deficits   - Assess range of motion  - Assess patient's mobility; develop plan if impaired  - Assess patient's need for assistive devices and provide as appropriate  - Encourage maximum independence but intervene and supervise when necessary  - Involve family in performance of ADLs  - Assess for home care needs following discharge   - Consider OT consult to assist with ADL evaluation and planning for discharge  - Provide patient education as appropriate  Outcome: Progressing  Goal: Maintain or return mobility status to optimal level  Description: INTERVENTIONS:  - Assess patient's baseline mobility status (ambulation, transfers, stairs, etc )    - Identify cognitive and physical deficits and behaviors that affect mobility  - Identify mobility aids required to assist with transfers and/or ambulation (gait belt, sit-to-stand, lift, walker, cane, etc )  - [place] fall precautions as indicated by assessment  - Record patient progress and toleration of activity level on Mobility SBAR; progress patient to next Phase/Stage  - Instruct patient to call for assistance with activity based on assessment  - Consider rehabilitation consult to assist with strengthening/weightbearing, etc   Outcome: Progressing     Problem: DISCHARGE PLANNING  Goal: Discharge to home or other facility with appropriate resources  Description: INTERVENTIONS:  - Identify barriers to discharge w/patient and caregiver  - Arrange for needed discharge resources and transportation as appropriate  - Identify discharge learning needs (meds, wound care, etc )  - Arrange for interpretive services to assist at discharge as needed  - Refer to Case Management Department for coordinating discharge planning if the patient needs post-hospital services based on physician/advanced practitioner order or complex needs related to functional status, cognitive ability, or social support system  Outcome: Progressing     Problem: Knowledge Deficit  Goal: Patient/family/caregiver demonstrates understanding of disease process, treatment plan, medications, and discharge instructions  Description: Complete learning assessment and assess knowledge base    Interventions:  - Provide teaching at level of understanding  - Provide teaching via preferred learning methods  Outcome: Progressing

## 2021-03-02 NOTE — ASSESSMENT & PLAN NOTE
· Type 1 myocardial infarction with troponin greater than 40  · He has significant 3 vessel coronary artery disease  · Due to his comorbidities is not considered a CABG candidate  · Underwent PCI to the LAD  Severely reduced ejection fraction with a EF of 22%  Will need LifeVest at discharge, being arranged today    Will be medically cleared for discharge once life vest has been arranged  Results from last 7 days   Lab Units 03/01/21  0518 02/28/21  1809 02/28/21  1416 02/28/21  0821 02/28/21  0405 02/28/21  0106   TROPONIN I ng/mL 30 86* 31 55* 33 98* 38 91* 38 97* >40 00*       Cardiac catheterization from 03/01/2021    3V CAD, with completely occluded LCX and RCA poor distal targets for CABG  RCA culprit for MI (symptomatically 5 days ago)  Proceeded with PCI LAD with MOSHE     RECOMMENDATIONS  GDMT CAD  LCX occluded and too small distally meaningful revascularization/no target cabg   RCA culprit for MI 5 days ago(too late now in presentation consider pci)

## 2021-03-02 NOTE — PROGRESS NOTES
Progress Note - Alen Blackmon 1940, 68 y o  male MRN: [id number]    Unit/Bed#: E4 -01 Encounter: [id number]    Primary Care Provider: Charo Lanza MD   Date and time admitted to hospital: 2/28/2021  7:35 AM        Transaminitis  Assessment & Plan  · Transaminitis improved  · Hepatitis C negative  · Outpatient workup    Results from last 7 days   Lab Units 03/01/21  0518 02/28/21  0106   AST U/L 45 83*   ALT U/L 39 61   TOTAL BILIRUBIN mg/dL 0 50 1 00       Smokes a pipe  Assessment & Plan  · Patient smokes a pipe  Will be prescribed nicotine patch during hospitalization  · Counseled for greater than 11 minutes on smoking cessation    Hypertension  Assessment & Plan  · Continue metoprolol succinate    Hyperlipidemia  Assessment & Plan  · Started high-intensity statin therapy, Lipitor 80 mg given coronary artery disease and recent stent    Hyperglycemia due to type 2 diabetes mellitus [facility])  Assessment & Plan  Lab Results   Component Value Date    HGBA1C 7 5 (H) 02/28/2021     Recent Labs     03/01/21  1859 03/01/21  2036 03/02/21  0739 03/02/21  1127   POCGLU 132 176* 221* 254*     · Diabetes mellitus on 70/30, actos and glipizide  · Increased to 20 units twice a day, from 15 units twice a day  · Will continue 70/30 insulin with sliding scale    · Will need strict glycemic control    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  Assessment & Plan  · Osteoporosis on fosamax weekly with ergocalciferol    * NSTEMI (non-ST elevated myocardial infarction) ([facility] )  Assessment & Plan  · Type 1 myocardial infarction with troponin greater than 40  · He has significant 3 vessel coronary artery disease  · Due to his comorbidities is not considered a CABG candidate  · Underwent PCI to the LAD  Severely reduced ejection fraction with a EF of 22%  Will need LifeVest at discharge, being arranged today    Will be medically cleared for discharge once life vest has been arranged  Results from last 7 days   Lab Units 03/01/21  0518 02/28/21  1809 02/28/21  1416 02/28/21  0821 02/28/21  0405 02/28/21  0106   TROPONIN I ng/mL 30 86* 31 55* 33 98* 38 91* 38 97* >40 00*       Cardiac catheterization from 03/01/2021    3V CAD, with completely occluded LCX and RCA poor distal targets for CABG  RCA culprit for MI (symptomatically 5 days ago)  Proceeded with PCI LAD with MOSHE     RECOMMENDATIONS  GDMT CAD  LCX occluded and too small distally meaningful revascularization/no target cabg  RCA culprit for MI 5 days ago(too late now in presentation consider pci)    [facility] Internal Medicine Progress Note  Patient: Chen Mcknight 68 y o  male   MRN: [id number]  PCP: Santiago Crain MD  Unit/Bed#: E4 -01 Encounter: [id number]  Date Of Visit: 03/02/21    Assessment:    Principal Problem:    NSTEMI (non-ST elevated myocardial infarction) ([facility] )  Active Problems:    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    Hyperglycemia due to type 2 diabetes mellitus ([facility] )    Hyperlipidemia    Hypertension    Smokes a pipe    Transaminitis      Plan:    · Life vest fitting  · Physical therapy occupational therapy  · Hope for discharge within the next 24 hours  · Will need cardiac rehab       VTE Pharmacologic Prophylaxis:   Pharmacologic: Heparin  Mechanical VTE Prophylaxis in Place: Yes    Patient Centered Rounds: I have performed bedside rounds with nursing staff today  Discussions with Specialists or Other Care Team Provider:  Cardiology    Education and Discussions with Family / Patient:  Patient's brother contacted at 3:55 pm    Time Spent for Care: 45 minutes  More than 50% of total time spent on counseling and coordination of care as described above      Current Length of Stay: 2 day(s)    Current Patient Status: Inpatient   Certification Statement: The patient will continue to require additional inpatient hospital stay due to Treatment of myocardial infarct    Discharge Plan / Estimated Discharge Date:  Tomorrow    Code Status: Level 1 - Full Code      Subjective: Patient seen and examined, he states he feels improved  He is anxious to return back to his job at [employer] as a   He does not feel short of breath, he does have chest pain  No nausea vomiting    A complete and comprehensive 14 point organ system review has been performed and all other systems are negative other than stated above  Objective:     Vitals:   Temp (24hrs), Av 9 °F (36 6 °C), Min:97 6 °F (36 4 °C), Max:98 3 °F (36 8 °C)    Temp:  [97 6 °F (36 4 °C)-98 3 °F (36 8 °C)] 97 6 °F (36 4 °C)  HR:  [70-78] 75  Resp:  [17-18] 18  BP: (108-125)/(54-67) 116/63  SpO2:  [90 %-94 %] 90 %  There is no height or weight on file to calculate BMI  Input and Output Summary (last 24 hours):        Intake/Output Summary (Last 24 hours) at 3/2/2021 1552  Last data filed at 3/1/2021 2001  Gross per 24 hour   Intake 462 ml   Output 950 ml   Net -488 ml       Physical Exam:     General: well appearing, no acute distress  HEENT: atraumatic, PERRLA, moist mucosa, normal pharynx, normal tonsils and adenoids, normal tongue, no fluid in sinuses  Neck: Trachea midline, no carotid bruit, no masses  Respiratory: normal chest wall expansion, CTA B, no r/r/w, no rubs  Cardiovascular: RRR, no m/r/g, Normal S1 and S2  Abdomen: Soft, non-tender, non-distended, normal bowel sounds in all quadrants, no hepatosplenomegaly, no tympany  Rectal: deferred  Musculoskeletal: normal ROM in upper and lower extremities  Integumentary: warm, dry, and pink, with no rash, purpura, or petechia  Heme/Lymph: no lymphadenopathy, no bruises  Neurological: Cranial Nerves II-XII grossly intact, no tics, normal sensation to pressure and light touch  Psychiatric: cooperative with normal mood, affect, and cognition      Additional Data:     Labs:    Results from last 7 days   Lab Units 21  0518 21  0106   WBC Thousand/uL 7 02 7 15   HEMOGLOBIN g/dL 12 6 13 7   HEMATOCRIT % 38 3 44 0   PLATELETS Thousands/uL 210 231 NEUTROS PCT %  --  75   LYMPHS PCT %  --  13*   MONOS PCT %  --  11   EOS PCT %  --  1     Results from last 7 days   Lab Units 03/01/21  0518   POTASSIUM mmol/L 4 3   CHLORIDE mmol/L 103   CO2 mmol/L 26   BUN mg/dL 31*   CREATININE mg/dL 1 19   CALCIUM mg/dL 9 2   ALK PHOS U/L 99   ALT U/L 39   AST U/L 45     Results from last 7 days   Lab Units 02/28/21  0106   INR  1 17       * I Have Reviewed All Lab Data Listed Above  * Additional Pertinent Lab Tests Reviewed:  [facility] Admission Reviewed    Imaging:    Imaging Reports Reviewed Today Include:  No new imaging  Imaging Personally Reviewed by Myself Includes:  No new imaging    Recent Cultures (last 7 days):           Last 24 Hours Medication List:   Current Facility-Administered Medications   Medication Dose Route Frequency Provider Last Rate    acetaminophen  650 mg Oral Q6H PRN Lin Nicholson DO      aspirin  81 mg Oral Daily Cade Rahman DO      atorvastatin  80 mg Oral Daily With Dinner Chacorta Barrera DO      cholecalciferol  1,000 Units Oral Daily Lin Nicholson DO      vitamin B-12  1,000 mcg Oral Daily Lin Nicholson DO      heparin (porcine)  5,000 Units Subcutaneous Q8H [address] Chacorta Barrera DO      insulin aspart protamine-insulin aspart  20 Units Subcutaneous BID AC Chacorta Barrera,       insulin lispro  1-6 Units Subcutaneous 4x Daily (AC & HS) Lin Nicholson DO      magnesium hydroxide  30 mL Oral BID PRN Lin Nicholson DO      metoprolol succinate  12 5 mg Oral Daily Bruce Carney MD      morphine injection  2 mg Intravenous Q4H PRN Lin Nicholson DO      nicotine  1 patch Transdermal Daily Lin Nicholson DO      nitroglycerin  0 4 mg Sublingual Q5 Min PRN Cade Rahman DO      ondansetron  4 mg Intravenous Q6H PRN Lin Nicholson, DO      oxyCODONE  5 mg Oral Q4H PRN Lin Nicholson DO      ticagrelor  90 mg Oral Q12H [address] Susie Lopez DO          Today, Patient Was Seen By: Hollie Noonan DO    ** Please Note: This note has been constructed using a voice recognition system   **

## 2021-03-02 NOTE — OCCUPATIONAL THERAPY NOTE
Occupational Therapy Evaluation + Treatment Note     Patient Name: Miriam Gramajo  UDBIK'N Date: 3/2/2021  Problem List  Principal Problem:    NSTEMI (non-ST elevated myocardial infarction) [facility])  Active Problems:    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    Hyperglycemia due to type 2 diabetes mellitus ([facility] [address] )    Hyperlipidemia    Hypertension    Smokes a pipe    Transaminitis    Past Medical History  Past Medical History:   Diagnosis Date    Arthritis     [facility]     Hyperlipidemia     Osteoporosis      Past Surgical History  Past Surgical History:   Procedure Laterality Date    TOTAL HIP ARTHROPLASTY            03/02/21 1015   OT Last Visit   OT Visit Date 03/02/21   Note Type   Note type Evaluation   Restrictions/Precautions   Weight Bearing Precautions Per Order No   Other Precautions Cardiac/sternal;Telemetry; Fall Risk; Chair Alarm   Pain Assessment   Pain Assessment Tool 0-10   Pain Score No Pain  (some discomfort in chest however resolved before mobilizing )   Home Living   Type of Home House  ([place] style)   Home Layout Two level;Performs ADLs on one level; Able to live on main level with bedroom/bathroom; Laundry in basement;Stairs to enter with rails  (first floor setup; 3STE; FF to basement)   Bathroom Shower/Tub Tub/shower unit   Bathroom Toilet Standard   Bathroom Equipment Grab bars in shower; Shower chair   [address] Cane;Walker  (used [facility] in home and community)   Additional Comments patient sleeps on main/first floor  goes to basement to complete laundry   Prior Function   Level of [place] Independent with ADLs and functional mobility  (+ IADLs; + drives)   Lives With Alone   Receives Help From Family; Neighbor   ADL Assistance Independent   IADLs Independent   Falls in the last 6 months 1 to 4  (2; falling on L shoulder + R shoulder)   Vocational Retired   Psychosocial   Psychosocial (WDL) WDL   Subjective   Subjective "It's great that I don't feel dizzy!"   ADL   Eating Assistance 5  Supervision/Setup   Grooming Assistance 5  Supervision/Setup   UB Bathing Assistance 5  Supervision/Setup   LB Bathing Assistance 4  Minimal Assistance   UB Dressing Assistance 5  Supervision/Setup   LB Dressing Assistance 4  Minimal [address]  4  Minimal Assistance   Bed Mobility   Supine to Sit 3  Moderate assistance   Additional items Assist x 1;HOB elevated; Bedrails; Increased time required;Verbal cues   Additional Comments Arrived to patient supine in bed  Seated EOB BP 99/64, O2 varying between 90-93% on room air  Increased time and effort  Transfers   Sit to Stand 4  Minimal assistance   Additional items Assist x 1; Increased time required;Verbal cues   Stand to Sit 4  Minimal assistance   Additional items Assist x 1; Increased time required;Verbal cues   Stand pivot 4  Minimal assistance   Additional items Assist x 1; Increased time required;Verbal cues   Additional Comments transfers completed w/ SPC  Patient requires cues for hand placement, safety, and sequencing     Functional Mobility   Functional Mobility 4  Minimal assistance   Additional Comments AX1 short household distances, SPC vs RW   Additional items SPC   Balance   Static Sitting Fair +   Dynamic Sitting Fair   Static Standing Poor +   Dynamic Standing Poor   Ambulatory Poor   Activity Tolerance   Activity Tolerance Patient limited by fatigue   Medical Staff Made Aware  [facility]   Nurse Made Aware SCOTT LOPEZ Assessment   RUE Assessment WFL  (increased time + effort w/ higher deg sh flex; mmt 3+/5)   LUE Assessment   LUE Assessment WFL  (increased time + effort w/ higher deg sh flex; mmt 3+/5)   Hand Function   Gross Motor Coordination Functional   Fine Motor Coordination Functional   Sensation   Light Touch No apparent deficits   Sharp/Dull No apparent deficits   Vision-Basic Assessment   Current Vision Wears glasses all the time   Vision - Complex Assessment Additional Comments reports hx of cataracts, difficulty w/ reading up close however can read clock and paper re: call bell + safety under TV from bedside chair w/o difficulty  Cognition   Overall Cognitive Status WFL   Arousal/Participation Alert; Responsive; Cooperative   Attention Attends with cues to redirect  (slightly tangential at times)   Orientation Level Oriented X4   Memory Within functional limits   Following Commands Follows one step commands with increased time or repetition   Comments pleasant and cooperative throughout session  demos fair safety awareness and good use of call bell  patient has chair alarm on and active when patient seated OOB to chair at end of session  Assessment   Limitation Decreased ADL status; Decreased UE strength;Decreased Safe judgement during ADL;Decreased endurance;Decreased fine motor control;Decreased self-care trans;Decreased high-level ADLs   Prognosis Fair   Assessment Pt is a 68 y o  male seen for OT evaluation s/p admit to [facility] on 2/28/2021 w/ NSTEMI (non-ST elevated myocardial infarction) ([facility] [address] )  Underwent cardiac cath yesterday (3/1/2021) and shows 90% stenosis proximal LAD, mild LAD; 100% stenosis mid circumflex and distal RCA  Comorbidities affecting pt's functional performance at time of assessment include: transaminitis, smoking hx, hypertension, hyperlipidemia, hyperglycemia (due to T2DM) (please see extensive list of comorbidites)  Patient also reports multiple falls at home (has fallen on both shoulders, noted to have slow movements w/ higher degrees of shoulder flexion)  Personal factors affecting pt at time of IE include:steps to enter environment, limited home support, difficulty performing ADLS, difficulty performing IADLS , level of education, limited insight into deficits, flat affect, decreased initiation and engagement , health management  and environment   Prior to admission, pt was indep with ADLs, IADLs, and functional transfers/mobility with use of a SPC  Patient lives alone in a [address] ([facility] style) with first floor setup  Patient has 3STE and uses the basement to complete his laundry  Patient reports he works part time at [employer] 4x/wk overnight shifts, a lot of walking and being on his feet during the shifts  Upon evaluation: Patient requires S increased time for UB ADLs, min A for LB ADLs, min to mod A for functional transfers/mobility with use of SPC vs RW  At times, varying w/ mod A for transfers due to fatigue  Pt presents with the following deficits impacting occupational performance: weakness, decreased ROM, decreased strength, decreased balance, decreased tolerance, impaired attention, impaired memory, impaired sequencing, impaired problem solving, decreased safety awareness, impaired interpersonal skills and decreased coping skills  Pt to benefit from continued skilled OT tx while in the hospital to address deficits as defined above and maximize level of functional independence w ADL's and functional mobility  Occupational Performance areas to address include: eating, grooming, bathing/shower, toilet hygiene, dressing, medication management, socialization, health maintenance, functional mobility, community mobility, clothing management, cleaning, meal prep, money management, household maintenance, job performance/volunteering and social participation  From OT standpoint, recommendation at time of d/c would be post acute rehab services  The patient's raw score on the AM-PAC Daily Activity inpatient short form is 20, standardized score is 42 03, greater than 39 4  Patients at this level are likely to benefit from DC to post-acute rehabilitation services as patient lives alone  Please refer to the recommendation of the Occupational Therapist for safe DC planning  Goals   Patient Goals to follow up w/ cardiology   Long Term Goal see below   Plan   Treatment Interventions ADL retraining;Functional transfer training;UE strengthening/ROM; Endurance training;Equipment evaluation/education;Patient/family training;Fine motor coordination activities; Compensatory technique education; Activityengagement; Energy conservation;Cardiac education   Goal Expiration Date 03/14/21   OT Treatment Day 1   OT Frequency 3-5x/wk   Additional Treatment Session   Start Time 1005   End Time 1020   Treatment Assessment Pt agreeable to OT treatment following IE  Patient sitting EOB for 10 mins unsupported w/ S for sitting balance  Patient with BP of 99/64 seated EOB, no reports of dizziness  Requiring min A for functional transfers w/ SPC vs RW from EOB, mod A from bedside chair with cues for sequencing, safety and initiation of tasks  Patient no reports of dizziness throughout, BP improved w/ blocked transfer training to 120/64, O2 94% on room air  Patient completed UB dressing with S, OT assisted w/ threading telemetry box through pocket  Patient overall motivated, pleasant and cooperative during session  Required cues for RW mgmt with short household distance ambulation to safely perform ADLs and IADL tasks at home      Recommendation   OT Discharge Recommendation Post-Acute Rehabilitation Services   OT - OK to Discharge Yes  (to rehab when medically cleared)   AM-PAC Daily Activity Inpatient   Lower Body Dressing 3   Bathing 3   Toileting 3   Upper Body Dressing 3   Grooming 4   Eating 4   Daily Activity Raw Score 20   Daily Activity Standardized Score (Calc for Raw Score >=11) 42 03   AM-PAC Applied Cognition Inpatient   Following a Speech/Presentation 4   Understanding Ordinary Conversation 4   Taking Medications 4   Remembering Where Things Are Placed or Put Away 4   Remembering List of 4-5 Errands 4   Taking Care of Complicated Tasks 3   Applied Cognition Raw Score 23   Applied Cognition Standardized Score 53 08   Barthel Index   Feeding 10   Bathing 0   Grooming Score 5   Dressing Score 5   Bladder Score 10   Bowels Score 10   Toilet Use Score 5   Transfers (Bed/Chair) Score 5 Mobility (Level Surface) Score 0   Stairs Score 0   Barthel Index Score 50       Pt will achieve the following goals by d/c:    1  Pt will complete bathroom mobility Mod I   2  Pt will complete bed mobility with Mod I for seated ADLs EOB  3  Pt will complete LB ADLs with Mod I with/without adaptive techs  4  Patient will complete toileting with Mod I/I   5  Patient will increase functional/ADL transfers to Mod I level with G activity tolerance and balance  6  Continue to monitor functional cognition, safety and behaviors during I/ADL participation in efforts to decrease risk of falls/injury  7  Pt will demonstrate 30 minutes activity tolerance for ADLs  8  Pt will ID 2-3 EC techniques to utilize for inc'd performance with daily purposeful tasks  9  Pt will participate in therex HEP 1-3x/week for inc'd overall stamina/activity tolerance for purposeful tasks        Lina Katz MS, OTR/L

## 2021-03-02 NOTE — DISCHARGE INSTRUCTIONS
Please do not skip any doses of Aspirin or Brilinta  These medications are keeping your stent open  You may take them everyday at the same time with a meal  Missing doses runs the risk of having your stents close  Over-the-counter Aspirin (81 mg daily) is fine, however Brilinta (90 mg twice daily) is prescription only  We have sent scripts to your preferred pharmacy  If you have any problems at all picking up your medications please call the cardiology office right away at [phone]  Heart Attack   WHAT YOU NEED TO KNOW:   What is a heart attack? A heart attack happens when the blood vessels that supply blood to your heart are blocked  This can damage your heart or lead to an abnormal heart rhythm or heart failure  A heart attack is also called a myocardial infarction  What are the signs and symptoms of a heart attack? · Chest pain, tightness, or heaviness that can last 30 minutes or longer    · Pressure, crushing, squeezing, or burning in your chest    · Discomfort that spreads to your neck, jaw, shoulders, back, or arms    · Heartburn, abdominal pain, nausea, or vomiting    · Feeling weak, dizzy, or like you are going to faint    · Trouble catching your breath or taking a deep breath    · Feeling cold and sweaty    · Fast heartbeat    · You may not have typical symptoms if you are a woman or an older adult, or have diabetes or heart failure  You may only have shortness of breath and no other symptoms  You may have no symptoms at all  What is the difference between angina and a heart attack? Angina is chest pain, tightness, or discomfort that comes and goes  It gets worse with activity or stress  It gets better with rest, medicine called nitroglycerin, or both  Angina does not damage the heart like a heart attack does  Angina may be a warning sign that you are at risk for a heart attack  Ask your healthcare provider for more information on angina  What causes a heart attack?    · Plaque , also called fatty deposits, can build up inside one or more of your coronary arteries  This can cause the arteries to become narrow and slow or block the blood flow  Small pieces can also break off and block blood flow  · Blood clots  may form on each side of the plaque  This can slow or stop blood flow to your heart  · Heart spasm  is when a coronary artery suddenly tightens and stops blood flow to part of the heart  What increases my risk for a heart attack? · High cholesterol, diabetes, or high blood pressure    · Smoking cigarettes or chewing tobacco    · A family history of heart attack    · Being a man older than 54    · Being a woman who has gone through menopause    · Use of illegal drugs such as cocaine or methamphetamines    · Obesity or being overweight for several years    · A lack of physical activity, especially sitting for long periods every day    How is a heart attack diagnosed? Your healthcare provider will ask when your chest pain started, what it feels like  Tell the provider if anything makes the pain better or worse  He or she will ask if you took nitroglycerin or other medicines  He or she will ask you about your medical history and if you have had these signs and symptoms before  You may need the following tests:  · Blood tests  can help healthcare providers know if your heart has been damaged  · X-ray  pictures may show an enlarged heart or fluid in your lungs  · An EKG  records your heart rhythm and how fast your heart beats  It is used to check for damage to your heart  · An echocardiogram  is a type of ultrasound  Sound waves are used to show the structure and function of your heart  · Angiography  is a test used to look for blockage in your coronary arteries, such as plaque or blood clots  A thin tube called a catheter is placed into an artery, usually in your groin  Contrast liquid is put through the catheter, and x-ray pictures are taken of the blood flow   Tell healthcare providers if you have ever had an allergic reaction to contrast liquid  How is a heart attack treated? · Medicines  may be given to help the coronary arteries open so your heart can get the blood it needs  Medicine may also help decrease pain, blood pressure, or control your heart rate  You may also need medicine to help thin your blood to keep clots from forming  This medicine makes it more likely for you to bleed or bruise  After a heart attack, you may also be given medicine to decrease the amount of cholesterol and plaque in your blood  ? Do not take certain medicines without asking your healthcare provider first   These include NSAIDs, herbal or vitamin supplements, or hormones (estrogen or progestin)  · Angioplasty  is a procedure to open an artery blocked by plaque  A small tube with a balloon on the end is threaded into the blocked artery  After the tube is in the artery, the balloon is filled with liquid  As the balloon fills, it presses the plaque against the artery wall so blood can flow through the artery more easily  · Coronary intravascular stent placement  is also called coronary artery stenting  The stent is a small mesh wire that is inserted into an artery to keep it open so blood can flow through it  · Coronary artery bypass graft (CABG) surgery  is also known as heart bypass surgery or open heart surgery  CABG can improve blood flow to the heart by sending blood around a blocked part of an artery  This surgery may also decrease your risk for a heart attack in the future  What should I do if I think I am having a heart attack? If you have chest pain for 2 to 3 minutes, stop what you are doing  Call 911 if your chest pain does not go away or gets worse within 5 minutes  Sit or lie down while you wait for the ambulance  What can I do to manage my health? · Check your blood pressure at home  High blood pressure can lead to a heart attack   Sit and rest for 5 minutes before you take your blood pressure  Extend your arm and support it on a flat surface  Your arm should be at the same level as your heart  Follow the directions that came with your monitor  If possible, take at least 2 readings each time  Take your blood pressure at least 2 times each day at the same times, such as mornings and evenings  Keep a record of your readings and bring it to your follow-up visits  Ask your healthcare provider what your blood pressure should be  · Get a flu vaccine every year as soon as it is available  The vaccine will help prevent the flu  A heart attack will make it harder for you to fight off the flu virus on your own  The flu may also be worse for you than for a person who has not had a heart attack  Ask about other vaccinations you may need  What lifestyle changes may I need to make after a heart attack? · Do not smoke  Nicotine and other chemicals in cigarettes and cigars can cause lung and heart damage  Ask your healthcare provider for information if you currently smoke and need help to quit  E-cigarettes or smokeless tobacco still contain nicotine  Talk to your healthcare provider before you use these products  · Follow a heart-healthy diet  A heart-healthy diet is an eating plan low in total fat, unhealthy fats, and sodium (salt)  A heart-healthy diet helps decrease your risk for heart disease and stroke  Limit the amount of fat you eat to 25% to 35% of your total daily calories  Your healthcare provider may recommend the DASH (Dietary Approaches to Stop Hypertension) Eating Plan to help lower high blood pressure and LDL (bad) cholesterol  The plan is low in sodium, sugar, unhealthy fats, and total fat  It is high in potassium, calcium, magnesium, and fiber  Ask for more information about this plan  · Limit sodium (salt) as directed  Too much sodium can affect your fluid balance  Check labels to find low-sodium or no-salt-added foods   Some low-sodium foods use potassium salts for flavor  Too much potassium can also cause health problems  Your healthcare provider will tell you how much sodium and potassium are safe for you to have in a day  He or she may recommend that you limit sodium to 2,300 mg a day  · Exercise as directed  Ask your healthcare provider about the best exercise plan for you  Exercise makes your heart stronger, lowers blood pressure, and helps prevent a heart attack  The goal is 30 to 60 minutes a day, 5 to 7 days a week  You may have to work up to this goal  Healthcare providers can help you reach this goal, starting in cardiac rehab sessions  · Maintain a healthy weight  Ask your healthcare provider how much you should weigh  He or she can help you create a safe weight loss plan if you are overweight  · Manage stress  Stress may increase your risk for a heart attack  Learn ways to control stress, such as relaxation, deep breathing, and music  Talk to someone about things that upset you  Call your local emergency number (911 in the [address] [facility]) for any of the following:   · You have any of the following signs of a heart attack:      ? Squeezing, pressure, or pain in your chest    ? You may  also have any of the following:     § Discomfort or pain in your back, neck, jaw, stomach, or arm    § Shortness of breath    § Nausea or vomiting    § Lightheadedness or a sudden cold sweat      When should I seek immediate care? · You are tired and cannot think clearly  · Your heart is beating faster than usual     · You are bleeding from your gums or nose  · You see blood in your urine or bowel movements  · You urinate less than usual or not at all  · You have new or increased swelling in your feet or ankles  When should I call my doctor or cardiologist?   · You have trouble taking your heart medicine  · You have questions or concerns about your condition or care      CARE AGREEMENT:   You have the right to help plan your care  Learn about your health condition and how it may be treated  Discuss treatment options with your healthcare providers to decide what care you want to receive  You always have the right to refuse treatment  The above information is an  only  It is not intended as medical advice for individual conditions or treatments  Talk to your doctor, nurse or pharmacist before following any medical regimen to see if it is safe and effective for you  © Copyright [employer] Information is for End User's use only and may not be sold, redistributed or otherwise used for commercial purposes   All illustrations and images included in CareNotes® are the copyrighted property of [employer]  or [address] [place] [address] [employer]

## 2021-03-02 NOTE — SOCIAL WORK
MD reports looking to discharge tomorrow  Pt will need a Life Vest  Checked with PA-C and Life Vest was order and should be fitted today  PT now recommending IP Rehab  Will need to f/u with pt and complete open

## 2021-03-02 NOTE — PLAN OF CARE
Problem: Potential for Falls  Goal: Patient will remain free of falls  Description: INTERVENTIONS:  - Assess patient frequently for physical needs  -  Identify cognitive and physical deficits and behaviors that affect risk of falls  -  [place] fall precautions as indicated by assessment   - Educate patient/family on patient safety including physical limitations  - Instruct patient to call for assistance with activity based on assessment  - Modify environment to reduce risk of injury  - Consider OT/PT consult to assist with strengthening/mobility  Outcome: Progressing     Problem: Nutrition/Hydration-ADULT  Goal: Nutrient/Hydration intake appropriate for improving, restoring or maintaining nutritional needs  Description: Monitor and assess patient's nutrition/hydration status for malnutrition  Collaborate with interdisciplinary team and initiate plan and interventions as ordered  Monitor patient's weight and dietary intake as ordered or per policy  Utilize nutrition screening tool and intervene as necessary  Determine patient's food preferences and provide high-protein, high-caloric foods as appropriate       INTERVENTIONS:  - Monitor oral intake, urinary output, labs, and treatment plans  - Assess nutrition and hydration status and recommend course of action  - Evaluate amount of meals eaten  - Assist patient with eating if necessary   - Allow adequate time for meals  - Recommend/ encourage appropriate diets, oral nutritional supplements, and vitamin/mineral supplements  - Order, calculate, and assess calorie counts as needed  - Recommend, monitor, and adjust tube feedings and TPN/PPN based on assessed needs  - Assess need for intravenous fluids  - Provide specific nutrition/hydration education as appropriate  - Include patient/family/caregiver in decisions related to nutrition  Outcome: Progressing     Problem: PAIN - ADULT  Goal: Verbalizes/displays adequate comfort level or baseline comfort level  Description: Interventions:  - Encourage patient to monitor pain and request assistance  - Assess pain using appropriate pain scale  - Administer analgesics based on type and severity of pain and evaluate response  - Implement non-pharmacological measures as appropriate and evaluate response  - Consider cultural and social influences on pain and pain management  - Notify physician/advanced practitioner if interventions unsuccessful or patient reports new pain  Outcome: Progressing     Problem: INFECTION - ADULT  Goal: Absence or prevention of progression during hospitalization  Description: INTERVENTIONS:  - Assess and monitor for signs and symptoms of infection  - Monitor lab/diagnostic results  - Monitor all insertion sites, i e  indwelling lines, tubes, and drains  - Monitor endotracheal if appropriate and nasal secretions for changes in amount and color  - [place] appropriate cooling/warming therapies per order  - Administer medications as ordered  - Instruct and encourage patient and family to use good hand hygiene technique  - Identify and instruct in appropriate isolation precautions for identified infection/condition  Outcome: Progressing  Goal: Absence of fever/infection during neutropenic period  Description: INTERVENTIONS:  - Monitor WBC    Outcome: Progressing     Problem: SAFETY ADULT  Goal: Patient will remain free of falls  Description: INTERVENTIONS:  - Assess patient frequently for physical needs  -  Identify cognitive and physical deficits and behaviors that affect risk of falls    -  [place] fall precautions as indicated by assessment   - Educate patient/family on patient safety including physical limitations  - Instruct patient to call for assistance with activity based on assessment  - Modify environment to reduce risk of injury  - Consider OT/PT consult to assist with strengthening/mobility  Outcome: Progressing  Goal: Maintain or return to baseline ADL function  Description: INTERVENTIONS:  - Assess patient frequently for physical needs  -  Identify cognitive and physical deficits and behaviors that affect risk of falls    -  [place] fall precautions as indicated by assessment   - Educate patient/family on patient safety including physical limitations  - Instruct patient to call for assistance with activity based on assessment  - Modify environment to reduce risk of injury  - Consider OT/PT consult to assist with strengthening/mobility  Outcome: Progressing  Goal: Maintain or return mobility status to optimal level  Description: INTERVENTIONS:  -  Assess patient's ability to carry out ADLs; assess patient's baseline for ADL function and identify physical deficits which impact ability to perform ADLs (bathing, care of mouth/teeth, toileting, grooming, dressing, etc )  - Assess/evaluate cause of self-care deficits   - Assess range of motion  - Assess patient's mobility; develop plan if impaired  - Assess patient's need for assistive devices and provide as appropriate  - Encourage maximum independence but intervene and supervise when necessary  - Involve family in performance of ADLs  - Assess for home care needs following discharge   - Consider OT consult to assist with ADL evaluation and planning for discharge  - Provide patient education as appropriate  Outcome: Progressing     Problem: DISCHARGE PLANNING  Goal: Discharge to home or other facility with appropriate resources  Description: INTERVENTIONS:  - Identify barriers to discharge w/patient and caregiver  - Arrange for needed discharge resources and transportation as appropriate  - Identify discharge learning needs (meds, wound care, etc )  - Arrange for interpretive services to assist at discharge as needed  - Refer to Case Management Department for coordinating discharge planning if the patient needs post-hospital services based on physician/advanced practitioner order or complex needs related to functional status, cognitive ability, or social support system  Outcome: Progressing     Problem: Knowledge Deficit  Goal: Patient/family/caregiver demonstrates understanding of disease process, treatment plan, medications, and discharge instructions  Description: Complete learning assessment and assess knowledge base    Interventions:  - Provide teaching at level of understanding  - Provide teaching via preferred learning methods  Outcome: Progressing

## 2021-03-02 NOTE — PLAN OF CARE
Problem: PHYSICAL THERAPY ADULT  Goal: Performs mobility at highest level of function for planned discharge setting  See evaluation for individualized goals  Description: Treatment/Interventions: Functional transfer training, LE strengthening/ROM, Elevations, Therapeutic exercise, Endurance training, Patient/family training, Equipment eval/education, Bed mobility, Gait training, Compensatory technique education, Continued evaluation, Spoke to nursing, OT  Equipment Recommended: Corey Simon       See flowsheet documentation for full assessment, interventions and recommendations  Outcome: Progressing  Note: Prognosis: Good  Problem List: Decreased strength, Decreased endurance, Impaired balance, Decreased mobility, Decreased coordination  Assessment: Aniyah Christopher is a 68 y o  male with a past medical history of diabetes mellitus and osteoporosis who presents with debility and chest discomfort  NSTEMI s/p cardiac cath-PCI of LAD  Severe three-vessel CAD  Not CABG candidate  Cardiomyopathy with reduced LV systolic function and EF 59%  PT consulted  Up and OOB as tolerated  Prior to admission resides alone in [facility] style home with first floor accommodations  Stairs to laundry in basement  Ambulates with cane at baseline  Driving, working part time at [employer]  Hx of 2 falls reported  Support from brother and neighbors  Currently presents with functional limitations related to decreased activity tolerance, balance, strength, coordination, functional mobility and locomotion  Requires modA for bed mobility  Ita for transfers from bed height elevations  Ita for ambulation with use of SPC, but very unsteady with same  Gait with wide KAIDEN, decreased foot clearance and step length with steppage gait and slightly ataxic  Increased risk for falls given impairments  Vitals sitting EOB 99/64 with RA O2 sat 90-93%  After short distance ambulation seated in chair 114/71 and RA O2 sat 91-92%    No SOB or chest pain reported  Given impairments and functional decline from independence and living alone will benefit from ongoing rehab at d/c in order to progress and assist with return to baseline independence  The patient's AM-PAC Basic Mobility Inpatient Short Form Raw Score is 16, Standardized Score is 38 32  A standardized score less than 42 9 suggests the patient may benefit from discharge to post-acute rehabilitation services  Please also refer to the recommendation of the Physical Therapist for safe discharge planning  At this time STR recommended  See progress note following eval for gait training with RW   Barriers to Discharge: Inaccessible home environment, Decreased caregiver support  Barriers to Discharge Comments: lives alone, SHERIDAN home, Basement laundry with steps  PT Discharge Recommendation: (S) Post-Acute Rehabilitation Services     PT - OK to Discharge: (yes to rehab when medically stable  NO to home alone )    See flowsheet documentation for full assessment

## 2021-03-02 NOTE — PHYSICAL THERAPY NOTE
PT EVALUATION 09:45-10:00 ( 15 minutes)    68 y o     [id number]    Chest pain [R07 9]    Past Medical History:   Diagnosis Date    Arthritis     Diabetes mellitus ([address] )     Hyperlipidemia     Osteoporosis          Past Surgical History:   Procedure Laterality Date    TOTAL HIP ARTHROPLASTY        03/02/21 0945   PT Last Visit   PT Visit Date 03/02/21   Note Type   Note type Evaluation   Pain Assessment   Pain Score No Pain   Home Living   Type of Home House  ([place])   Home Layout Two level; Able to live on main level with bedroom/bathroom;Stairs to enter with rails; Laundry in basement  (3 SHERIDAN with R rail )   Bathroom Shower/Tub Tub/shower unit   Bathroom Toilet Standard   Bathroom Equipment Grab bars in shower; Shower chair   Bathroom Accessibility Accessible   Home Equipment Cane   Additional Comments resides alone in [address] [facility] style home  3 SHERIDAN  Main floor living  Driving  Local brother and neighbors for support  Prior Function   Level of [place] Independent with ADLs and functional mobility   Lives With Alone   Receives Help From Family; [address] in the last 6 months 1 to 4  (2)   Vocational Retired   Comments Reports independence with use of cane at all times  Restrictions/Precautions   Other Precautions Cardiac/sternal;Telemetry; Fall Risk   General   Additional Pertinent History Pt is 68 y o male admitted with NSTEMI s/p cardiac cath and PCI to LAD  PT consulted      Family/Caregiver Present No   Cognition   Overall Cognitive Status WFL   Arousal/Participation Alert   Orientation Level Oriented X4   Following Commands Follows all commands and directions without difficulty   Comments Pleasant   RUE Assessment   RUE Assessment WFL   LUE Assessment   LUE Assessment WFL   RLE Assessment   RLE Assessment WFL   Strength RLE   R Hip Flexion 3+/5   R Knee Extension 4-/5   R Ankle Dorsiflexion 3+/5   R Ankle Plantar Flexion 3+/5   LLE Assessment   LLE Assessment WFL   Strength LLE   L Hip Flexion 3+/5   L Ankle Dorsiflexion 3+/5   L Knee Extension 4-/5   L Ankle Plantar Flexion 3+/5   Coordination   Movements are Fluid and Coordinated 0   Coordination and Movement Description gait ataxia   Light Touch   RLE Light Touch Grossly intact   LLE Light Touch Grossly intact   Bed Mobility   Supine to Sit 3  Moderate assistance   Additional items Assist x 1; Increased time required;Verbal cues;LE management;HOB elevated; Bedrails   Additional Comments Increased time BP EOB 99/64  RA O2 sats 90-93%   Transfers   Sit to Stand 4  Minimal assistance   Additional items Assist x 1; Increased time required;Verbal cues   Stand to Sit 4  Minimal assistance   Additional items Assist x 1; Increased time required;Verbal cues;Armrests   Additional Comments cues for hand placement  Increased time  Wide stance  Ambulation/Elevation   Gait pattern Improper Weight shift;Decreased foot clearance; Steppage; Inconsistent yarelis; Wide KAIDEN; Short stride; Excessively slow  (unsteady)   Gait Assistance 4  Minimal assist   Additional items Assist x 1; Tactile cues; Verbal cues   Assistive Device [facility]   Distance Amb with SPC 8'x1   + unsteady  /71 with RA O2 sat 91-92%   Balance   Static Sitting Fair +   Dynamic Sitting Fair   Static Standing Poor +   Dynamic Standing Poor   Ambulatory Poor   Endurance Deficit   Endurance Deficit Yes   Endurance Deficit Description fatigue, weakness,deconditioning  Activity Tolerance   Activity Tolerance Patient limited by fatigue;Treatment limited secondary to medical complications (Comment)   Medical Staff Made Aware Nurse, [place] Ot-Lauren   Nurse Made Aware yes   Assessment   Prognosis Good   Problem List Decreased strength;Decreased endurance; Impaired balance;Decreased mobility; Decreased coordination   Assessment Gregory Krause is a 68 y o  male with a past medical history of diabetes mellitus and osteoporosis who presents with debility and chest discomfort  NSTEMI s/p cardiac cath-PCI of LAD  Severe three-vessel CAD  Not CABG candidate  Cardiomyopathy with reduced LV systolic function and EF 10%  PT consulted  Up and OOB as tolerated  Prior to admission resides alone in [place] [facility] style home with first floor accommodations  Stairs to laundry in basement  Ambulates with cane at baseline  Driving, working part time at [employer]  Hx of 2 falls reported  Support from brother and neighbors  Currently presents with functional limitations related to decreased activity tolerance, balance, strength, coordination, functional mobility and locomotion  Requires modA for bed mobility  Ita for transfers from bed height elevations  Ita for ambulation with use of SPC, but very unsteady with same  Gait with wide KAIDEN, decreased foot clearance and step length with steppage gait and slightly ataxic  Increased risk for falls given impairments  Vitals sitting EOB 99/64 with RA O2 sat 90-93%  After short distance ambulation seated in chair 114/71 and RA O2 sat 91-92%  No SOB or chest pain reported  Given impairments and functional decline from independence and living alone will benefit from ongoing rehab at d/c in order to progress and assist with return to baseline independence  The patient's AM-PAC Basic Mobility Inpatient Short Form Raw Score is 16, Standardized Score is 38 32  A standardized score less than 42 9 suggests the patient may benefit from discharge to post-acute rehabilitation services  Please also refer to the recommendation of the Physical Therapist for safe discharge planning  At this time STR recommended  See progress note following eval for gait training with RW    Barriers to Discharge Inaccessible home environment;Decreased caregiver support   Barriers to Discharge Comments lives alone, SHERIDAN home, Basement laundry with steps  Goals   Patient Goals to have medical follow ups near [place]     [facility] Expiration Date 03/12/21 Short Term Goal #1 10 days:1)  Pt will perform bed mobility with [place] demonstrating appropriate technique 100% of the time in order to improve function  2)  Perform all transfers with Arlin demonstrating safe and appropriate technique 100% of the time in order to improve ability to negotiate safely in home environment  3) Amb with least restrictive AD > 200'x1 with mod I in order to demonstrate ability to negotiate in home environment  4)  Improve overall strength and balance 1/2 grade in order to optimize ability to perform functional tasks and reduce fall risk  5) Increase activity tolerance to 45 minutes in order to improve endurance to functional tasks  6)  Negotiate stairs using most appropriate technique and Arlin in order to be able to negotiate safely in home environment  7) PT for ongoing patient and family/caregiver education, DME needs and d/c planning in order to promote highest level of function in least restrictive environment  PT Treatment Day 1   Plan   Treatment/Interventions Functional transfer training;LE strengthening/ROM; Elevations; Therapeutic exercise; Endurance training;Patient/family training;Equipment eval/education; Bed mobility;Gait training; Compensatory technique education;Continued evaluation;Spoke to nursing;OT   PT Frequency Other (Comment)  (4-5x/wk)   Recommendation   PT Discharge Recommendation Post-Acute Rehabilitation Services   Equipment Recommended [facility] Recommended Wheeled walker  (pt reports has RW at home  )   PT - OK to Discharge   (yes to rehab when medically stable  NO to home alone )   Additional Comments resides home alone  Curently requires increased assistance for all mobility nd will benefit from STR at d/c at this time     AM-PAC Basic Mobility Inpatient   Turning in Bed Without Bedrails 3   Lying on Back to Sitting on Edge of Flat Bed 3   Moving Bed to Chair 3   Standing Up From Chair 2   Walk in Room 3   Climb 3-5 Stairs 2   Basic Mobility Inpatient Raw Score 16   Basic Mobility Standardized Score 38 32     History: co - morbidities, fall risk, use of assistive device,  multiple lines, alone, SHERIDAN  Exam: impairments in locomotion, musculoskeletal, balance,posture, joint integrity,  cardiac  Clinical: unstable/unpredictable ( fall risk, decreased activity tolerance, ongoing cardiac management, tele)  Complexity:high      Vijaya Burt PT     Time In: 1000  Time Out:1015  Total Time: 15 minutes      S:  Agreeable to trial RW  Admits to feeling unsteady  O:  Transfers sit to stand from chair with modA, cues for hand placement  Ambulation with RW support 30'x1 with Naomi  Stand to sit with Naomi  /64 and RA O2 sat 96%  Remained OOB in chair with LE elevated in recliner and chair alarm in place  A:  Improved stability with use of RW but continues to demonstrate unsteady gait with steppage pattern, slowed pace, wide KAIDEN, short stride and inconsistent pace  Increased risk for falls  Cont to rec use of RW for improved stability and rehab to assist with return to independence  P:  PT per POC, progress as tolerated      Vijaya Burt, PT

## 2021-03-03 VITALS
RESPIRATION RATE: 18 BRPM | DIASTOLIC BLOOD PRESSURE: 55 MMHG | TEMPERATURE: 97.1 F | HEART RATE: 72 BPM | OXYGEN SATURATION: 90 % | SYSTOLIC BLOOD PRESSURE: 108 MMHG

## 2021-03-03 LAB
GLUCOSE SERPL-MCNC: 196 MG/DL (ref 65–140)
GLUCOSE SERPL-MCNC: 196 MG/DL (ref 65–140)
GLUCOSE SERPL-MCNC: 200 MG/DL (ref 65–140)
GLUCOSE SERPL-MCNC: 200 MG/DL (ref 65–140)
GLUCOSE SERPL-MCNC: 278 MG/DL (ref 65–140)
GLUCOSE SERPL-MCNC: 278 MG/DL (ref 65–140)

## 2021-03-03 PROCEDURE — 99239 HOSP IP/OBS DSCHRG MGMT >30: CPT | Performed by: INTERNAL MEDICINE

## 2021-03-03 PROCEDURE — 82948 REAGENT STRIP/BLOOD GLUCOSE: CPT

## 2021-03-03 RX ORDER — ATORVASTATIN CALCIUM 40 MG/1
40 TABLET, FILM COATED ORAL DAILY
Qty: 30 TABLET | Refills: 0 | Status: SHIPPED | OUTPATIENT
Start: 2021-03-03 | End: 2021-04-06 | Stop reason: SDUPTHER

## 2021-03-03 RX ORDER — NITROGLYCERIN 0.4 MG/1
0.4 TABLET SUBLINGUAL
Qty: 21 TABLET | Refills: 0 | Status: SHIPPED | OUTPATIENT
Start: 2021-03-03

## 2021-03-03 RX ORDER — METOPROLOL SUCCINATE 25 MG/1
12.5 TABLET, EXTENDED RELEASE ORAL DAILY
Qty: 30 TABLET | Refills: 0 | Status: SHIPPED | OUTPATIENT
Start: 2021-03-04 | End: 2021-04-06 | Stop reason: SDUPTHER

## 2021-03-03 RX ADMIN — HEPARIN SODIUM 5000 UNITS: 5000 INJECTION INTRAVENOUS; SUBCUTANEOUS at 14:43

## 2021-03-03 RX ADMIN — INSULIN LISPRO 4 UNITS: 100 INJECTION, SOLUTION INTRAVENOUS; SUBCUTANEOUS at 12:17

## 2021-03-03 RX ADMIN — ASPIRIN 81 MG CHEWABLE TABLET 81 MG: 81 TABLET CHEWABLE at 09:49

## 2021-03-03 RX ADMIN — Medication 1000 UNITS: at 09:49

## 2021-03-03 RX ADMIN — CYANOCOBALAMIN TAB 500 MCG 1000 MCG: 500 TAB at 09:49

## 2021-03-03 RX ADMIN — HEPARIN SODIUM 5000 UNITS: 5000 INJECTION INTRAVENOUS; SUBCUTANEOUS at 05:25

## 2021-03-03 RX ADMIN — INSULIN ASPART 20 UNITS: 100 INJECTION, SUSPENSION SUBCUTANEOUS at 09:49

## 2021-03-03 RX ADMIN — INSULIN LISPRO 2 UNITS: 100 INJECTION, SOLUTION INTRAVENOUS; SUBCUTANEOUS at 09:50

## 2021-03-03 RX ADMIN — TICAGRELOR 90 MG: 90 TABLET ORAL at 09:49

## 2021-03-03 RX ADMIN — NICOTINE 7 MG/24 HR DAILY TRANSDERMAL PATCH 1 PATCH: at 09:56

## 2021-03-03 RX ADMIN — METOPROLOL SUCCINATE 12.5 MG: 25 TABLET, EXTENDED RELEASE ORAL at 09:45

## 2021-03-03 NOTE — DISCHARGE INSTR - AVS FIRST PAGE
Dear Chen Mcknight,     It was our pleasure to care for you here at [facility], [facility]  It is our hope that we were always able to exceed the expected standards for your care during your stay  You were hospitalized due to Myocardial infarction (Heart attack)  You were cared for on the 4th floor by Mike Caal DO with the [facility] who covers for your primary care physician (PCP), Santiago Crain MD, while you were hospitalized  If you have any questions or concerns related to this hospitalization, you may contact us at [phone]  For follow up as well as any medication refills, we recommend that you follow up with your primary care physician  A registered nurse will reach out to you by phone within a few days after your discharge to answer any additional questions that you may have after going home  However, at this time we provide for you here, the most important instructions / recommendations at discharge:     · Notable Medication Adjustments -   · Brillinta twice a day  · Lipitor 40 mg  · Aspirin 81 mg  · Metoprolol 12 5 mg BID  · Testing Required after Discharge -   · Cardiac rehab  · Important follow up information -   · Follow up with cardiology as scheduled  · Other Instructions -   · Continue to wear your Life Vest until evaluated by cardiology  · Please review this entire after visit summary as additional general instructions including medication list, appointments, activity, diet, any pertinent wound care, and other additional recommendations from your care team that may be provided for you  Diagnosis:  1  Type I MI- ACS-NSTEMI  2  Severe 3 vessel CAD, POD 1 S/P PCI of LAD   3  Cardiomyopathy with severely reduced LV systolic function, EF 87%  4  HFrEF and grade III diastolic dysfunction  5  DM, insulin dependent  6   Ventricular tachycardia, nonsustained      · Continue dual anti-platelet therapy with aspirin and Brilinta for minimum of 1 year, stressed the importance of not missing any of these doses or he went to the risk of InStent thrombosis     · Will try to get him on a beta-blocker given multivessel CAD as well ischemic cardiomyopathy, start metoprolol succinate 12 5 mg today     · Continue high-dose atorvastatin     · Life Vest prior to discharge, ordered      · Follow-up in [place] office 3/9/21, advised out of work until seen in the office       Sincerely,     Danna Hernadez DO  And Nurse Matilde AGUILAR

## 2021-03-03 NOTE — PLAN OF CARE
Problem: Potential for Falls  Goal: Patient will remain free of falls  Description: INTERVENTIONS:  - Assess patient frequently for physical needs  -  Identify cognitive and physical deficits and behaviors that affect risk of falls  -  [place] fall precautions as indicated by assessment   - Educate patient/family on patient safety including physical limitations  - Instruct patient to call for assistance with activity based on assessment  - Modify environment to reduce risk of injury  - Consider OT/PT consult to assist with strengthening/mobility  Outcome: Progressing     Problem: Nutrition/Hydration-ADULT  Goal: Nutrient/Hydration intake appropriate for improving, restoring or maintaining nutritional needs  Description: Monitor and assess patient's nutrition/hydration status for malnutrition  Collaborate with interdisciplinary team and initiate plan and interventions as ordered  Monitor patient's weight and dietary intake as ordered or per policy  Utilize nutrition screening tool and intervene as necessary  Determine patient's food preferences and provide high-protein, high-caloric foods as appropriate       INTERVENTIONS:  - Monitor oral intake, urinary output, labs, and treatment plans  - Assess nutrition and hydration status and recommend course of action  - Evaluate amount of meals eaten  - Assist patient with eating if necessary   - Allow adequate time for meals  - Recommend/ encourage appropriate diets, oral nutritional supplements, and vitamin/mineral supplements  - Order, calculate, and assess calorie counts as needed  - Recommend, monitor, and adjust tube feedings and TPN/PPN based on assessed needs  - Assess need for intravenous fluids  - Provide specific nutrition/hydration education as appropriate  - Include patient/family/caregiver in decisions related to nutrition  Outcome: Progressing     Problem: PAIN - ADULT  Goal: Verbalizes/displays adequate comfort level or baseline comfort level  Description: Interventions:  - Encourage patient to monitor pain and request assistance  - Assess pain using appropriate pain scale  - Administer analgesics based on type and severity of pain and evaluate response  - Implement non-pharmacological measures as appropriate and evaluate response  - Consider cultural and social influences on pain and pain management  - Notify physician/advanced practitioner if interventions unsuccessful or patient reports new pain  Outcome: Progressing     Problem: INFECTION - ADULT  Goal: Absence or prevention of progression during hospitalization  Description: INTERVENTIONS:  - Assess and monitor for signs and symptoms of infection  - Monitor lab/diagnostic results  - Monitor all insertion sites, i e  indwelling lines, tubes, and drains  - Monitor endotracheal if appropriate and nasal secretions for changes in amount and color  - [place] appropriate cooling/warming therapies per order  - Administer medications as ordered  - Instruct and encourage patient and family to use good hand hygiene technique  - Identify and instruct in appropriate isolation precautions for identified infection/condition  Outcome: Progressing  Goal: Absence of fever/infection during neutropenic period  Description: INTERVENTIONS:  - Monitor WBC    Outcome: Progressing     Problem: SAFETY ADULT  Goal: Patient will remain free of falls  Description: INTERVENTIONS:  - Assess patient frequently for physical needs  -  Identify cognitive and physical deficits and behaviors that affect risk of falls    -  [place] fall precautions as indicated by assessment   - Educate patient/family on patient safety including physical limitations  - Instruct patient to call for assistance with activity based on assessment  - Modify environment to reduce risk of injury  - Consider OT/PT consult to assist with strengthening/mobility  Outcome: Progressing  Goal: Maintain or return to baseline ADL function  Description: INTERVENTIONS:  -  Assess patient's ability to carry out ADLs; assess patient's baseline for ADL function and identify physical deficits which impact ability to perform ADLs (bathing, care of mouth/teeth, toileting, grooming, dressing, etc )  - Assess/evaluate cause of self-care deficits   - Assess range of motion  - Assess patient's mobility; develop plan if impaired  - Assess patient's need for assistive devices and provide as appropriate  - Encourage maximum independence but intervene and supervise when necessary  - Involve family in performance of ADLs  - Assess for home care needs following discharge   - Consider OT consult to assist with ADL evaluation and planning for discharge  - Provide patient education as appropriate  Outcome: Progressing  Goal: Maintain or return mobility status to optimal level  Description: INTERVENTIONS:  - Assess patient's baseline mobility status (ambulation, transfers, stairs, etc )    - Identify cognitive and physical deficits and behaviors that affect mobility  - Identify mobility aids required to assist with transfers and/or ambulation (gait belt, sit-to-stand, lift, walker, cane, etc )  - [place] fall precautions as indicated by assessment  - Record patient progress and toleration of activity level on Mobility SBAR; progress patient to next Phase/Stage  - Instruct patient to call for assistance with activity based on assessment  - Consider rehabilitation consult to assist with strengthening/weightbearing, etc   Outcome: Progressing     Problem: DISCHARGE PLANNING  Goal: Discharge to home or other facility with appropriate resources  Description: INTERVENTIONS:  - Identify barriers to discharge w/patient and caregiver  - Arrange for needed discharge resources and transportation as appropriate  - Identify discharge learning needs (meds, wound care, etc )  - Arrange for interpretive services to assist at discharge as needed  - Refer to Case Management Department for coordinating discharge planning if the patient needs post-hospital services based on physician/advanced practitioner order or complex needs related to functional status, cognitive ability, or social support system  Outcome: Progressing     Problem: Knowledge Deficit  Goal: Patient/family/caregiver demonstrates understanding of disease process, treatment plan, medications, and discharge instructions  Description: Complete learning assessment and assess knowledge base    Interventions:  - Provide teaching at level of understanding  - Provide teaching via preferred learning methods  Outcome: Progressing

## 2021-03-03 NOTE — SOCIAL WORK
LOS 3 days, no bundle, no 30 days readmission and unplanned readmission score is 11  PCP is Dr Autumn Bahena  Pt lives in [facility] alone in a 2 story house with first floor set up  There are 3 steps to enter  Pt was independent with ADLs, drives a car, amb with SPC and works part-time at [employer]  DME:  RW, SPC, and shower bench  Pt is not open with any VNA  [facility]  No hx of BH or D&A  Pt has POA/Living Will which is his brother  Brother will transport home  Pt rec his Life Vest yesterday  PT is recommending IP Rehab  Pt has refused and also refused home therapy  Explained IMM, gave him a copy and copy put in bin to be scan  CM reviewed d/c planning process including the following: identifying help at home, patient preference for d/c planning needs, Discharge Lounge, [facility] Meds to Bed program, availability of treatment team to discuss questions or concerns patient and/or family may have regarding understanding medications and recognizing signs and symptoms once discharged  CM also encouraged patient to follow up with all recommended appointments after discharge  Patient advised of importance for patient and family to participate in managing patients medical well being

## 2021-03-03 NOTE — NURSING NOTE
AVS and telemetry removed form patient  AVS reviewed with patient and brother  Patient was dressed by nurse  Had life vest on when leaving hospital  Agreeable to stay with brother for a few days until VNA set up for home  Patient taken to pharmay by PCA for medication

## 2021-03-03 NOTE — CASE MANAGEMENT
MD discharge pt home today  RN reports pt is now agreeable to VNA  Made several referrals for VNA, but informed pt it is late in the day and will not hear until tomorrow  LSW will call pt tomorrow with accepting VNA and fax AVS once written  Brother stated he will check with others in the family if someone can stay with pt a couple of days  RN is aware of this  A post acute care recommendation was made by your care team for [facility] 80  Discussed Freedom of Choice with patient  List of agencies given to patient via in person  patient aware the list is custom filtered for them by preference  and that [facility] post acute providers are designated  [facility] and [facility] VNA are not able to accept due to not accepting new cases  Will advance the search for VNA  [address] reports pt has no Rx plan  They can do one month free for Brilinta, but after that the cost will be high  Informed pt of this and MD DELACRUZ needs to discuss with his Cardiologist about going on something else or on [employer]  Pt stated he gets a discount with [address] since he works there part time  Gave pt the GoodRx card to check if he can use it or not  Pt has money to pay co-pays and will get [facility] med[facility] at [facility]  Brother is aware PT was recommending IP Rehab and pt refused  Pt will stay with his brother for 3 nights and then return home  Brother would like LSW to call him [phone] tomorrow with accepting VNA

## 2021-03-04 NOTE — ASSESSMENT & PLAN NOTE
· Type 1 myocardial infarction with troponin greater than 40  · He has significant 3 vessel coronary artery disease  · Due to his comorbidities is not considered a CABG candidate  · Underwent PCI to the LAD  Severely reduced ejection fraction with a EF of 22%  Discharge with life vest    Will need to follow up with cardiologist quicker  Discharged on Brilinta, aspirin 81 mg  Discussed with case management at discharge will get 30 days of Brilinta and will need to discuss alternative anti-platelet at discharge  I have sent a message to the patient's PCP as well as cardiologist to discuss changing the patient to Plavix if cost becomes an issue  Results from last 7 days   Lab Units 03/01/21  0518 02/28/21  1809 02/28/21  1416 02/28/21  0821 02/28/21  0405 02/28/21  0106   TROPONIN I ng/mL 30 86* 31 55* 33 98* 38 91* 38 97* >40 00*       Cardiac catheterization from 03/01/2021  3V CAD, with completely occluded LCX and RCA poor distal targets for CABG  RCA culprit for MI (symptomatically 5 days ago)  Proceeded with PCI LAD with MOSHE     RECOMMENDATIONS  GDMT CAD  LCX occluded and too small distally meaningful revascularization/no target cabg   RCA culprit for MI 5 days ago(too late now in presentation consider pci)

## 2021-03-04 NOTE — ASSESSMENT & PLAN NOTE
Lab Results   Component Value Date    HGBA1C 7 5 (H) 02/28/2021     Recent Labs     03/02/21  1636 03/02/21  2045 03/03/21  0733 03/03/21  1146   POCGLU 210* 200* 196* 278*  278*     · Discharged on home regimen  · Will need strict glycemic control as an outpatient

## 2021-03-04 NOTE — DISCHARGE SUMMARY
Discharge- Ranjit Amaro 1940, 68 y o  male MRN: [id number]    Unit/Bed#: E4 -01 Encounter: [id number]    Primary Care Provider: Klaudia Centeon MD   Date and time admitted to hospital: 2/28/2021  7:35 AM  Addendum:  Patient was found to have combined systolic and diastolic congestive heart failure with  acute exacerbation  He has evidence of grade 3 diastolic dysfunction as well as reduced ejection fraction  Admitting Provider:  Petra Leiva DO  Discharge Provider:  Vamshi Cabrales DO  Admission Date: 2/28/2021       Discharge Date: 03/03/21   LOS: 3  Primary Care Physician at Discharge: Khoa Frank COURSE:  Ranjit Amaro is a 68 y o  male who presented as a transfer from the [place] [id number] with chest pain  He was found to have elevated troponin concerning for type 1 myocardial infarction  The patient was [facility]  He was evaluated in consultation by the cardiology service  He was found to have multivessel coronary artery disease and underwent cardiac stenting  The patient was found to have a new reduced ejection fraction 23-24%  He was arranged for Life Vest placement, given risk of arrhythmia in the setting of new ejection fraction  The patient did clinically improve, it was recommended that he be discharged to rehabilitation however he did decline  The patient did improve, he was discharged with a supply of Brilinta and aspirin, and will need to discussed with Cardiology any additional medication changes  At the time of discharge the patient was without acute complaint, he was tolerating oral diet and was medically cleared for discharge  All questions were answered to the patient's satisfaction, and he was in agreement with discharge plan      The patient will be discharged with his brother will watch him over the next 3 days and social work has agreed to help the patient obtain outpatient VNA services  A referral to cardiac rehabilitation was also placed given the patient's recent myocardial infarction  DISCHARGE DIAGNOSES  Transaminitis  Assessment & Plan  · Transaminitis improved  · Hepatitis C negative  · Outpatient workup    Results from last 7 days   Lab Units 03/01/21  0518 02/28/21  0106   AST U/L 45 83*   ALT U/L 39 61   TOTAL BILIRUBIN mg/dL 0 50 1 00       Smokes a pipe  Assessment & Plan  · Patient smokes a pipe  Will be prescribed nicotine patch during hospitalization  · Counseled for greater than 11 minutes on smoking cessation    Hypertension  Assessment & Plan  · Continue metoprolol succinate, 12 5 mg twice a day    Hyperlipidemia  Assessment & Plan  · Started high-intensity statin therapy  · Discharged on Lipitor    Hyperglycemia due to type 2 diabetes mellitus [facility])  Assessment & Plan  Lab Results   Component Value Date    HGBA1C 7 5 (H) 02/28/2021     Recent Labs     03/02/21  1636 03/02/21  2045 03/03/21  0733 03/03/21  1146   POCGLU 210* 200* 196* 278*  278*     · Discharged on home regimen  · Will need strict glycemic control as an outpatient    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  Assessment & Plan  · Osteoporosis on fosamax weekly with ergocalciferol    * NSTEMI (non-ST elevated myocardial infarction) ([facility] [address] )  Assessment & Plan  · Type 1 myocardial infarction with troponin greater than 40  · He has significant 3 vessel coronary artery disease  · Due to his comorbidities is not considered a CABG candidate  · Underwent PCI to the LAD  Severely reduced ejection fraction with a EF of 22%  Discharge with life vest    Will need to follow up with cardiologist quicker  Discharged on Brilinta, aspirin 81 mg  Discussed with case management at discharge will get 30 days of Brilinta and will need to discuss alternative anti-platelet at discharge  I have sent a message to the patient's PCP as well as cardiologist to discuss changing the patient to Plavix if cost becomes an issue      Results from last 7 days   Lab Units 03/01/21  0518 02/28/21  1809 02/28/21  1416 02/28/21  0821 02/28/21  0405 02/28/21  0106   TROPONIN I ng/mL 30 86* 31 55* 33 98* 38 91* 38 97* >40 00*       Cardiac catheterization from 03/01/2021  3V CAD, with completely occluded LCX and RCA poor distal targets for CABG  RCA culprit for MI (symptomatically 5 days ago)  Proceeded with PCI LAD with MOSHE     RECOMMENDATIONS  GDMT CAD  LCX occluded and too small distally meaningful revascularization/no target cabg  RCA culprit for MI 5 days ago(too late now in presentation consider pci)      CONSULTING PROVIDERS   IP CONSULT TO CARDIOLOGY    PROCEDURES PERFORMED  * No surgery found *    RADIOLOGY RESULTS  Xr Chest 1 View Portable    Result Date: 2/28/2021  Narrative: CHEST INDICATION:   SOB  COMPARISON:  5/21/2015 EXAM PERFORMED/VIEWS:  XR CHEST PORTABLE FINDINGS: Cardiomediastinal silhouette appears unremarkable  Bilateral parahilar opacities and interstitial opacities throughout both lungs  No consolidation  No evidence of a pleural effusion or pneumothorax  Osseous structures appear within normal limits for patient age  Impression: Nonspecific bilateral lung opacities, possibly pulmonary edema and/or multifocal pneumonia  Workstation performed: [id number]     Ct Head Without Contrast    Result Date: 2/28/2021  Narrative: CT BRAIN - WITHOUT CONTRAST INDICATION:   dizziness  Patient has suspected COVID-19  COMPARISON:  None  TECHNIQUE:  CT examination of the brain was performed  In addition to axial images, sagittal and coronal 2D reformatted images were created and submitted for interpretation  Radiation dose length product (DLP) for this visit:  883 88 mGy-cm   This examination, like all CT scans performed in the [facility], was performed utilizing techniques to minimize radiation dose exposure, including the use of iterative  reconstruction and automated exposure control  IMAGE QUALITY:  Diagnostic   FINDINGS: PARENCHYMA: Decreased attenuation is noted in periventricular and subcortical white matter demonstrating an appearance that is statistically most likely to represent mild microangiopathic change  No CT signs of acute infarction  No intracranial mass, mass effect or midline shift  No acute parenchymal hemorrhage  VENTRICLES AND EXTRA-AXIAL SPACES:  Normal for the patient's age  VISUALIZED ORBITS AND PARANASAL SINUSES:  No acute abnormality involving the orbits  Mild scattered sinus mucosal thickening is noted  No fluid levels are seen  CALVARIUM AND EXTRACRANIAL SOFT TISSUES:  Normal      Impression: No acute intracranial abnormality  Workstation performed: [id number]       LABS  Results from last 7 days   Lab Units 03/01/21  0518 02/28/21  0106   WBC Thousand/uL 7 02 7 15   HEMOGLOBIN g/dL 12 6 13 7   HEMATOCRIT % 38 3 44 0   MCV fL 99* 101*   PLATELETS Thousands/uL 210 231   INR   --  1 17     Results from last 7 days   Lab Units 03/01/21  0518 02/28/21  0106   SODIUM mmol/L 135* 137   POTASSIUM mmol/L 4 3 4 5   CHLORIDE mmol/L 103 101   CO2 mmol/L 26 24   BUN mg/dL 31* 25   CREATININE mg/dL 1 19 1 16   CALCIUM mg/dL 9 2 9 0   ALBUMIN g/dL 2 8* 3 4*   TOTAL BILIRUBIN mg/dL 0 50 1 00   ALK PHOS U/L 99 120*   ALT U/L 39 61   AST U/L 45 83*   EGFR ml/min/1 73sq m 58 60   GLUCOSE RANDOM mg/dL 208* 117     Results from last 7 days   Lab Units 03/01/21  0518 02/28/21  1809 02/28/21  1416  02/28/21  0106   CK TOTAL U/L  --   --   --   --  426*   TROPONIN I ng/mL 30 86* 31 55* 33 98*   < > >40 00*   CK MB INDEX %  --   --   --   --  7 3*    < > = values in this interval not displayed       Results from last 7 days   Lab Units 02/28/21  0106   NT-PRO BNP pg/mL 5,602*          Results from last 7 days   Lab Units 03/03/21  1146 03/03/21  0733 03/02/21  2045 03/02/21  1636 03/02/21  1127 03/02/21  0739 03/01/21  2036 03/01/21  1859 03/01/21  1546 03/01/21  1225   POC GLUCOSE mg/dl 278*  278* 196* 200* 210* 254* 221* 176* 132 228* 136     Results from last 7 days   Lab Units 02/28/21  1416   HEMOGLOBIN A1C % 7 5*     Results from last 7 days   Lab Units 02/28/21  1416   TSH 3RD GENERATON uIU/mL 1 587         Results from last 7 days   Lab Units 02/28/21  1416   TRIGLYCERIDES mg/dL 64   CHOLESTEROL mg/dL 141   LDL CALC mg/dL 79   HDL mg/dL 49       Cultures:   Results from last 7 days   Lab Units 02/28/21  0215   COLOR UA  Straw   CLARITY UA  Clear   SPEC GRAV UA  1 020   PH UA  5 5   LEUKOCYTES UA  Negative   NITRITE UA  Negative   GLUCOSE UA mg/dl Negative   KETONES UA mg/dl 15 (1+)*   BILIRUBIN UA  Negative   BLOOD UA  Negative           Results from last 7 days   Lab Units 02/28/21  0110   INFLUENZA A PCR  Negative       PHYSICAL EXAM:  Vitals:   Blood Pressure: 108/55 (03/03/21 0732)  Pulse: 72 (03/03/21 0732)  Temperature: (!) 97 1 °F (36 2 °C) (03/03/21 0732)  Temp Source: Temporal (03/03/21 0732)  Respirations: 18 (03/03/21 0732)  SpO2: 90 % (03/03/21 0732)      General: well appearing, no acute distress  HEENT: atraumatic, PERRLA, moist mucosa, normal pharynx, normal tonsils and adenoids, normal tongue, no fluid in sinuses  Neck: Trachea midline, no carotid bruit, no masses  Respiratory: normal chest wall expansion, CTA B, no r/r/w, no rubs  Cardiovascular: RRR, murmur present  Abdomen: Soft, non-tender, non-distended, normal bowel sounds in all quadrants, no hepatosplenomegaly, no tympany  Rectal: deferred  Musculoskeletal: normal ROM in upper and lower extremities  Integumentary: warm, dry, and pink, with no rash, purpura, or petechia  Heme/Lymph: no lymphadenopathy, no bruises  Neurological: Cranial Nerves II-XII grossly intact, no tics, normal sensation to pressure and light touch  Psychiatric: cooperative with normal mood, affect, and cognition              Discharge Disposition: [facility] with [facility]      Test Results Pending at Discharge:           Medications   · Summary of Medication Adjustments made as a result of this hospitalization:  Lipitor, Brilinta and aspirin, metoprolol  · Medication Dosing Tapers - Please refer to Discharge Medication List for details on any medication dosing tapers (if applicable to patient)  · Discharge Medication List: See after visit summary for reconciled discharge medications  Diet restrictions: Activity restrictions: No strenuous activity  Discharge Condition: fair    Outpatient Follow-Up and Discharge Instructions  See after visit summary section titled Discharge Instructions for information provided to patient and family  Code Status: Prior  Discharge Statement   I spent 35 minutes discharging the patient  This time was spent on the day of discharge  Greater than 50% of total time was spent with the patient and / or family counseling and / or coordination of care  ** Please Note: This note has been constructed using a voice recognition system   **

## 2021-03-04 NOTE — CASE MANAGEMENT
[facility] has accepted the case  Fax AVS to them at [phone] today  TC to brother and gave him the name of the VNA and their phone number  Made brother aware they will reach out before they come on Friday

## 2021-03-05 ENCOUNTER — HOSPITAL ENCOUNTER (INPATIENT)
Facility: HOSPITAL | Age: 81
LOS: 4 days | Discharge: NURSING, REHAB OR LONG-TERM CARE | DRG: 091 | End: 2021-03-09
Attending: EMERGENCY MEDICINE | Admitting: INTERNAL MEDICINE
Payer: COMMERCIAL

## 2021-03-05 ENCOUNTER — APPOINTMENT (EMERGENCY)
Dept: RADIOLOGY | Facility: HOSPITAL | Age: 81
DRG: 091 | End: 2021-03-05
Payer: COMMERCIAL

## 2021-03-05 DIAGNOSIS — R26.2 AMBULATORY DYSFUNCTION: Primary | ICD-10-CM

## 2021-03-05 DIAGNOSIS — I25.10 CORONARY ARTERY DISEASE: ICD-10-CM

## 2021-03-05 DIAGNOSIS — I25.5 ISCHEMIC CARDIOMYOPATHY: ICD-10-CM

## 2021-03-05 LAB
ANION GAP SERPL CALCULATED.3IONS-SCNC: 7 MMOL/L (ref 4–13)
APTT PPP: 29 SECONDS (ref 23–37)
BACTERIA UR QL AUTO: ABNORMAL /HPF
BASOPHILS # BLD AUTO: 0.05 THOUSANDS/ΜL (ref 0–0.1)
BASOPHILS NFR BLD AUTO: 1 % (ref 0–1)
BILIRUB UR QL STRIP: NEGATIVE
BUN SERPL-MCNC: 28 MG/DL (ref 5–25)
CALCIUM SERPL-MCNC: 8.9 MG/DL (ref 8.3–10.1)
CHLORIDE SERPL-SCNC: 102 MMOL/L (ref 100–108)
CLARITY UR: CLEAR
CO2 SERPL-SCNC: 29 MMOL/L (ref 21–32)
COLOR UR: YELLOW
CREAT SERPL-MCNC: 1 MG/DL (ref 0.6–1.3)
EOSINOPHIL # BLD AUTO: 0.41 THOUSAND/ΜL (ref 0–0.61)
EOSINOPHIL NFR BLD AUTO: 6 % (ref 0–6)
ERYTHROCYTE [DISTWIDTH] IN BLOOD BY AUTOMATED COUNT: 13.6 % (ref 11.6–15.1)
FLUAV RNA RESP QL NAA+PROBE: NEGATIVE
FLUBV RNA RESP QL NAA+PROBE: NEGATIVE
GFR SERPL CREATININE-BSD FRML MDRD: 72 ML/MIN/1.73SQ M
GLUCOSE SERPL-MCNC: 183 MG/DL (ref 65–140)
GLUCOSE SERPL-MCNC: 383 MG/DL (ref 65–140)
GLUCOSE UR STRIP-MCNC: NEGATIVE MG/DL
HCT VFR BLD AUTO: 41.3 % (ref 36.5–49.3)
HGB BLD-MCNC: 13.1 G/DL (ref 12–17)
HGB UR QL STRIP.AUTO: NEGATIVE
IMM GRANULOCYTES # BLD AUTO: 0.06 THOUSAND/UL (ref 0–0.2)
IMM GRANULOCYTES NFR BLD AUTO: 1 % (ref 0–2)
INR PPP: 1.19 (ref 0.84–1.19)
KETONES UR STRIP-MCNC: NEGATIVE MG/DL
LEUKOCYTE ESTERASE UR QL STRIP: ABNORMAL
LYMPHOCYTES # BLD AUTO: 0.64 THOUSANDS/ΜL (ref 0.6–4.47)
LYMPHOCYTES NFR BLD AUTO: 10 % (ref 14–44)
MCH RBC QN AUTO: 31.6 PG (ref 26.8–34.3)
MCHC RBC AUTO-ENTMCNC: 31.7 G/DL (ref 31.4–37.4)
MCV RBC AUTO: 100 FL (ref 82–98)
MONOCYTES # BLD AUTO: 0.79 THOUSAND/ΜL (ref 0.17–1.22)
MONOCYTES NFR BLD AUTO: 12 % (ref 4–12)
MUCOUS THREADS UR QL AUTO: ABNORMAL
NEUTROPHILS # BLD AUTO: 4.43 THOUSANDS/ΜL (ref 1.85–7.62)
NEUTS SEG NFR BLD AUTO: 70 % (ref 43–75)
NITRITE UR QL STRIP: NEGATIVE
NON-SQ EPI CELLS URNS QL MICRO: ABNORMAL /HPF
NRBC BLD AUTO-RTO: 0 /100 WBCS
PH UR STRIP.AUTO: 5.5 [PH]
PLATELET # BLD AUTO: 252 THOUSANDS/UL (ref 149–390)
PMV BLD AUTO: 11.1 FL (ref 8.9–12.7)
POTASSIUM SERPL-SCNC: 4.4 MMOL/L (ref 3.5–5.3)
PROT UR STRIP-MCNC: NEGATIVE MG/DL
PROTHROMBIN TIME: 15.1 SECONDS (ref 11.6–14.5)
RBC # BLD AUTO: 4.14 MILLION/UL (ref 3.88–5.62)
RBC #/AREA URNS AUTO: ABNORMAL /HPF
RSV RNA RESP QL NAA+PROBE: NEGATIVE
SARS-COV-2 RNA RESP QL NAA+PROBE: NEGATIVE
SODIUM SERPL-SCNC: 138 MMOL/L (ref 136–145)
SP GR UR STRIP.AUTO: 1.02 (ref 1–1.03)
UROBILINOGEN UR QL STRIP.AUTO: >=8 E.U./DL
WBC # BLD AUTO: 6.38 THOUSAND/UL (ref 4.31–10.16)
WBC #/AREA URNS AUTO: ABNORMAL /HPF

## 2021-03-05 PROCEDURE — 99285 EMERGENCY DEPT VISIT HI MDM: CPT

## 2021-03-05 PROCEDURE — 85610 PROTHROMBIN TIME: CPT | Performed by: EMERGENCY MEDICINE

## 2021-03-05 PROCEDURE — 99223 1ST HOSP IP/OBS HIGH 75: CPT | Performed by: INTERNAL MEDICINE

## 2021-03-05 PROCEDURE — 87086 URINE CULTURE/COLONY COUNT: CPT | Performed by: INTERNAL MEDICINE

## 2021-03-05 PROCEDURE — 36415 COLL VENOUS BLD VENIPUNCTURE: CPT | Performed by: EMERGENCY MEDICINE

## 2021-03-05 PROCEDURE — 85025 COMPLETE CBC W/AUTO DIFF WBC: CPT | Performed by: EMERGENCY MEDICINE

## 2021-03-05 PROCEDURE — 85730 THROMBOPLASTIN TIME PARTIAL: CPT | Performed by: EMERGENCY MEDICINE

## 2021-03-05 PROCEDURE — 80048 BASIC METABOLIC PNL TOTAL CA: CPT | Performed by: EMERGENCY MEDICINE

## 2021-03-05 PROCEDURE — 71045 X-RAY EXAM CHEST 1 VIEW: CPT

## 2021-03-05 PROCEDURE — 99285 EMERGENCY DEPT VISIT HI MDM: CPT | Performed by: EMERGENCY MEDICINE

## 2021-03-05 PROCEDURE — 82948 REAGENT STRIP/BLOOD GLUCOSE: CPT

## 2021-03-05 PROCEDURE — 0241U HB NFCT DS VIR RESP RNA 4 TRGT: CPT | Performed by: EMERGENCY MEDICINE

## 2021-03-05 PROCEDURE — 81001 URINALYSIS AUTO W/SCOPE: CPT | Performed by: INTERNAL MEDICINE

## 2021-03-05 RX ORDER — NITROGLYCERIN 0.4 MG/1
0.4 TABLET SUBLINGUAL
Status: DISCONTINUED | OUTPATIENT
Start: 2021-03-05 | End: 2021-03-09 | Stop reason: HOSPADM

## 2021-03-05 RX ORDER — ATORVASTATIN CALCIUM 40 MG/1
40 TABLET, FILM COATED ORAL DAILY
Status: DISCONTINUED | OUTPATIENT
Start: 2021-03-06 | End: 2021-03-09 | Stop reason: HOSPADM

## 2021-03-05 RX ORDER — ACETAMINOPHEN 325 MG/1
650 TABLET ORAL EVERY 6 HOURS PRN
Status: DISCONTINUED | OUTPATIENT
Start: 2021-03-05 | End: 2021-03-09 | Stop reason: HOSPADM

## 2021-03-05 RX ORDER — METOPROLOL SUCCINATE 25 MG/1
12.5 TABLET, EXTENDED RELEASE ORAL DAILY
Status: DISCONTINUED | OUTPATIENT
Start: 2021-03-06 | End: 2021-03-09 | Stop reason: HOSPADM

## 2021-03-05 RX ORDER — INSULIN ASPART 100 [IU]/ML
15 INJECTION, SUSPENSION SUBCUTANEOUS
Status: DISCONTINUED | OUTPATIENT
Start: 2021-03-06 | End: 2021-03-09 | Stop reason: HOSPADM

## 2021-03-05 RX ORDER — ASPIRIN 81 MG/1
81 TABLET ORAL DAILY
Status: DISCONTINUED | OUTPATIENT
Start: 2021-03-06 | End: 2021-03-09 | Stop reason: HOSPADM

## 2021-03-05 RX ORDER — MELATONIN
1000 DAILY
Status: DISCONTINUED | OUTPATIENT
Start: 2021-03-06 | End: 2021-03-09 | Stop reason: HOSPADM

## 2021-03-05 RX ORDER — ONDANSETRON 2 MG/ML
4 INJECTION INTRAMUSCULAR; INTRAVENOUS EVERY 6 HOURS PRN
Status: DISCONTINUED | OUTPATIENT
Start: 2021-03-05 | End: 2021-03-09 | Stop reason: HOSPADM

## 2021-03-05 RX ADMIN — TICAGRELOR 90 MG: 90 TABLET ORAL at 21:06

## 2021-03-05 RX ADMIN — INSULIN LISPRO 10 UNITS: 100 INJECTION, SOLUTION INTRAVENOUS; SUBCUTANEOUS at 21:05

## 2021-03-05 NOTE — ASSESSMENT & PLAN NOTE
Lab Results   Component Value Date    HGBA1C 7 5 (H) 02/28/2021       Recent Labs     03/02/21  2045 03/02/21  2106 03/03/21  0733 03/03/21  1146   POCGLU 200* 200* 196*  196* 278*  278*       Blood Sugar Average: Last 72 hrs:   Continue on NovoLog 70/30 15 units SC b i d   Accu-Chek a c  HS with Humalog sliding scale

## 2021-03-05 NOTE — ASSESSMENT & PLAN NOTE
Patient is brought in by the family because of ambulatory dysfunction and high risk for falls  Family discussed with  who recommended coming to ER for rehab placement    P T /OT consult  Case management consult for skilled nursing rehab placement  Fall precautions

## 2021-03-05 NOTE — CASE MANAGEMENT
Just rec call from [facility] that pt's insurance is not in net work with them  They rec out to pt and he would have a co-pay and pt refused [facility]  TC to [facility] ([phone])  to check if they can accept case and if they are in net work with insurance  Waiting to hear back  TC to pt's brother and informed him trying to find V[facility] in net work with insurance  Pt is still staying with brother at [place] [phone] [facility] and fax [facility]S to them today  TC to pt's brother and gave him the name and number of the VNA  Rec a messages from pt's niece that pt needs to be placed in IP Rehab  Brother stated his niece was in the car that called this LSW asking if pt can be placed in IP Rehab  Talked with niece and she stated they talked pt into going to IP Rehab  Pt is now agreeable to IP Rehab  They called pt's PCP and he recommend pt come back to ED to be placed  Niece stated they are running errors and plan to bring pt back this evening to [address] heads up with CC that family is planning on bringing pt back to ED in at [facility] this evening

## 2021-03-05 NOTE — H&P
H&P- Charlene Hudson 1940, 68 y o  male MRN: [id number]    Unit/Bed#: ED 11 Encounter: [id number]    Primary Care Provider: Jim Bermudez MD   Date and time admitted to hospital: 3/5/2021  4:23 PM        * Ambulatory dysfunction  Assessment & Plan  Patient is brought in by the family because of ambulatory dysfunction and high risk for falls  Family discussed with  who recommended coming to ER for rehab placement  P T /OT consult  Case management consult for skilled nursing rehab placement  Fall precautions    Non-STEMI (non-ST elevated myocardial infarction) [facility])  Assessment & Plan  Patient was recently discharged from [place] [address] on March 3rd with type 1 MI  Patient had troponin greater than 40 during that admission  Patient underwent cardiac catheterization which showed severe three-vessel CAD, s/p PCI of LAD  Cardiomyopathy with severely reduced LV systolic dysfunction ejection fraction 24% with grade 3 diastolic dysfunction  Patient was not considered a good candidate for CABG because of his comorbidities  Continue on medical management  Patient had LifeVest placed  On aspirin, Brilinta, Lipitor, Toprol-XL  Denies any chest pain or shortness of breath    Hyperglycemia due to type 2 diabetes mellitus [facility])  Assessment & Plan  Lab Results   Component Value Date    HGBA1C 7 5 (H) 02/28/2021       Recent Labs     03/02/21  2045 03/02/21  2106 03/03/21  0733 03/03/21  1146   POCGLU 200* 200* 196*  196* 278*  278*       Blood Sugar Average: Last 72 hrs:   Continue on NovoLog 70/30 15 units SC b i d   Accu-Chek a c  HS with Humalog sliding scale    Smokes a pipe  Assessment & Plan  Smoking cessation counseling given  On nicotine patch    Hypertension  Assessment & Plan  Continue on Toprol XL  Monitor vitals as per protocol    Hyperlipidemia  Assessment & Plan  On statin      Chief Complaint   Patient presents with    Gait Problem     To ED with neice for placement  States that patient was discharged from the hospital 2 days ago and is to weak and needs help  States that she was referred for patient to be placed in a rehab or admitted to the hospital by provider  Patient has no complaints and transferred to bed with one assist         HPI:  Dory Hannah is a 68 y o  male with cardiomyopathy with ejection fraction of 24%, CAD, recent non STEMI type 1, diabetes, hypertension, hyperlipidemia who was discharged to his brother's house  Patient was brought by his niece to the hospital as she states that patient is unsafe because of ambulatory dysfunction and high risk for falls  Patient was discharged from [facility] on March 3rd after he was diagnosed with non STEMI type 1 and 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  Patient states that he walks with a help of a cane but in ER patient needed 2 people assist to get to the stretcher from his wheelchair  Earlier today niece had spoken to  and PCP and was recommended to go to ED to be placed for inpatient rehab  Patient does admit to having generalized weakness  Patient denies any chest pain, shortness of breath, nausea, vomiting, abdominal pain, dizziness, urinary complaints, palpitations  Patient has a LifeVest fitted from [facility]      Historical Information   Past Medical History:   Diagnosis Date    Arthritis     Diabetes mellitus ([address] )     Hyperlipidemia     Osteoporosis      Past Surgical History:   Procedure Laterality Date    TOTAL HIP ARTHROPLASTY       Social History   Social History     Substance and Sexual Activity   Alcohol Use Yes    Frequency: 4 or more times a week    Comment: pt states he usually has a small glass of wine most evenings     Social History     Substance and Sexual Activity   Drug Use No     Social History     Tobacco Use   Smoking Status Current Every Day Smoker    Types: Pipe   Smokeless Tobacco Never Used     Family History   Problem Relation Age of Onset    No Known Problems Mother     Arthritis Father     Diabetes unspecified Brother        Meds/Allergies   Allergies   Allergen Reactions    Metformin Diarrhea       Meds:    Current Facility-Administered Medications:     acetaminophen (TYLENOL) tablet 650 mg, 650 mg, Oral, Q6H PRN, Phil Parker MD    [START ON 3/6/2021] aspirin (ECOTRIN LOW STRENGTH) EC tablet 81 mg, 81 mg, Oral, Daily, Phil Parker MD    [START ON 3/6/2021] atorvastatin (LIPITOR) tablet 40 mg, 40 mg, Oral, Daily, Phil Parker MD    [START ON 3/6/2021] cholecalciferol (VITAMIN D3) tablet 1,000 Units, 1,000 Units, Oral, Daily, MD Chase Hendricks  [START ON 3/6/2021] cyanocobalamin (VITAMIN B-12) tablet 1,000 mcg, 1,000 mcg, Oral, Daily, Phil Parker MD    [START ON 3/6/2021] enoxaparin (LOVENOX) subcutaneous injection 40 mg, 40 mg, Subcutaneous, Daily, Phil Parker MD    [START ON 3/6/2021] insulin aspart protamine-insulin aspart (NovoLOG 70/30) 100 units/mL subcutaneous injection 15 Units, 15 Units, Subcutaneous, BID AC, Phil Parker MD    insulin lispro (HumaLOG) 100 units/mL subcutaneous injection 2-12 Units, 2-12 Units, Subcutaneous, TID AC **AND** [START ON 3/6/2021] Fingerstick Glucose (POCT), , , TID AC, Phil Parker MD    [START ON 3/6/2021] metoprolol succinate (TOPROL-XL) 24 hr tablet 12 5 mg, 12 5 mg, Oral, Daily, Phil Parker MD    [START ON 3/6/2021] nicotine (NICODERM CQ) 7 mg/24hr TD 24 hr patch 1 patch, 1 patch, Transdermal, Daily, Phil Parker MD    nitroglycerin (NITROSTAT) SL tablet 0 4 mg, 0 4 mg, Sublingual, Q5 Min PRN, Phil Parker MD    ondansetron (ZOFRAN) injection 4 mg, 4 mg, Intravenous, Q6H PRN, Phil Parker MD    ticagrelor (BRILINTA) tablet 90 mg, 90 mg, Oral, Q12H [facility], Phil Parker MD    Current Outpatient Medications:     alendronate (FOSAMAX) 70 mg tablet, once a week Takes on saturdays, Disp: , Rfl:     aspirin 81 MG tablet, Take 81 mg by mouth daily , Disp: , Rfl:     atorvastatin (LIPITOR) 40 mg tablet, Take 1 tablet (40 mg total) by mouth daily, Disp: 30 tablet, Rfl: 0    cholecalciferol (VITAMIN D3) 1,000 units tablet, Take by mouth daily , Disp: , Rfl:     cyanocobalamin (VITAMIN B-12) 1,000 mcg tablet, Take 1,000 mcg by mouth daily , Disp: , Rfl:     ergocalciferol (VITAMIN D2) 50,000 units, Take 50,000 Units by mouth once a week wednesdays, Disp: , Rfl: 0    glipiZIDE (GLIPIZIDE XL) 10 mg 24 hr tablet, Take 10 mg by mouth daily , Disp: , Rfl:     insulin NPH-insulin regular (NOVOLIN 70/30 RELION) 100 units/mL subcutaneous injection, Inject 15 Units under the skin 2 (two) times a day  , Disp: , Rfl:     metoprolol succinate (TOPROL-XL) 25 mg 24 hr tablet, Take 0 5 tablets (12 5 mg total) by mouth daily, Disp: 30 tablet, Rfl: 0    nicotine (NICODERM CQ) 7 mg/24hr TD 24 hr patch, Place 1 patch on the skin daily (Patient not taking: Reported on 3/5/2021), Disp: 28 patch, Rfl: 0    nitroglycerin (NITROSTAT) 0 4 mg SL tablet, Place 1 tablet (0 4 mg total) under the tongue every 5 (five) minutes as needed for chest pain, Disp: 21 tablet, Rfl: 0    pioglitazone (ACTOS) 45 mg tablet, Take 45 mg by mouth daily , Disp: , Rfl:     ticagrelor (BRILINTA) 90 MG, Take 1 tablet (90 mg total) by mouth every 12 (twelve) hours, Disp: 60 tablet, Rfl: 0    (Not in a hospital admission)        Review of Systems   Constitutional: Positive for activity change and fatigue  HENT: Negative  Eyes: Negative  Respiratory: Negative  Cardiovascular: Negative  Gastrointestinal: Negative  Endocrine: Negative  Genitourinary: Negative  Musculoskeletal: Positive for gait problem  Skin: Negative  Allergic/Immunologic: Negative  Neurological:        Generalized weakness   Hematological: Negative  Psychiatric/Behavioral: Negative          Current Vitals:   Blood Pressure: 116/55 (03/05/21 1645)  Pulse: 73 (03/05/21 1645)  Temperature: 98 2 °F (36 8 °C) (03/05/21 1640)  Temp Source: Tympanic (03/05/21 1640)  Respirations: 20 (03/05/21 1640)  Weight - Scale: 81 2 kg (179 lb) (03/05/21 1640)  SpO2: 95 % (03/05/21 1645)  SPO2 RA Rest      ED from 3/5/2021 in  [address] Emergency Department   SpO2  95 %   SpO2 Activity  At Rest   O2 Device  None (Room air)   O2 Flow Rate  --        No intake or output data in the 24 hours ending 03/05/21 1814  Body mass index is 25 68 kg/m²  Physical Exam  Vitals signs and nursing note reviewed  Constitutional:       General: He is not in acute distress  Appearance: He is well-developed  HENT:      Head: Normocephalic and atraumatic  Eyes:      General: No scleral icterus  Conjunctiva/sclera: Conjunctivae normal       Pupils: Pupils are equal, round, and reactive to light  Neck:      Musculoskeletal: Normal range of motion and neck supple  Thyroid: No thyromegaly  Vascular: No JVD  Cardiovascular:      Rate and Rhythm: Normal rate and regular rhythm  Heart sounds: Normal heart sounds  No murmur  Pulmonary:      Effort: Pulmonary effort is normal  No respiratory distress  Breath sounds: Normal breath sounds  No wheezing or rales  Chest:      Chest wall: No tenderness  Abdominal:      General: Bowel sounds are normal  There is no distension  Palpations: Abdomen is soft  There is no mass  Tenderness: There is no abdominal tenderness  There is no guarding or rebound  Musculoskeletal: Normal range of motion  General: No tenderness or deformity  Right lower leg: No edema  Left lower leg: No edema  Skin:     General: Skin is warm  Coloration: Skin is not pale  Findings: No erythema or rash  Neurological:      General: No focal deficit present  Mental Status: He is alert and oriented to person, place, and time  Cranial Nerves: No cranial nerve deficit     Psychiatric:         Behavior: Behavior normal  Judgment: Judgment normal          Lab Results:   CBC:   Lab Results   Component Value Date    WBC 6 38 03/05/2021    HGB 13 1 03/05/2021    HCT 41 3 03/05/2021     (H) 03/05/2021     03/05/2021    MCH 31 6 03/05/2021    MCHC 31 7 03/05/2021    RDW 13 6 03/05/2021    MPV 11 1 03/05/2021    NRBC 0 03/05/2021     CMP:  Lab Results   Component Value Date     05/23/2015     03/05/2021    CL 99 06/19/2018    CO2 29 03/05/2021    CO2 25 06/19/2018    ANIONGAP 8 05/23/2015    BUN 28 (H) 03/05/2021    BUN 22 06/19/2018    CREATININE 1 00 03/05/2021    CREATININE 0 80 05/23/2015    GLUCOSE 188 (H) 05/23/2015    CALCIUM 8 9 03/05/2021    CALCIUM 7 7 (L) 05/23/2015    AST 45 03/01/2021    AST 21 06/19/2018    ALT 39 03/01/2021    ALT 23 06/19/2018    ALKPHOS 99 03/01/2021    ALKPHOS 74 05/21/2015    PROT 7 5 05/21/2015    BILITOT 0 4 05/21/2015    EGFR 72 03/05/2021     Lab Results   Component Value Date    TROPONINI 30 86 (H) 03/01/2021    CKMB 31 0 (H) 02/28/2021    CKTOTAL 426 (H) 02/28/2021     Coagulation:   Lab Results   Component Value Date    INR 1 19 03/05/2021    INR 1 00 05/21/2015    Urinalysis:  Lab Results   Component Value Date    COLORU Straw 02/28/2021    CLARITYU Clear 02/28/2021    SPECGRAV 1 020 02/28/2021    PHUR 5 5 02/28/2021    LEUKOCYTESUR Negative 02/28/2021    NITRITE Negative 02/28/2021    GLUCOSEU Negative 02/28/2021    KETONESU 15 (1+) (A) 02/28/2021    BILIRUBINUR Negative 02/28/2021    BLOODU Negative 02/28/2021      Amylase: No results found for: AMYLASE  Lipase: No results found for: LIPASE     Imaging: Xr Chest 1 View Portable    Result Date: 2/28/2021  Narrative: CHEST INDICATION:   SOB  COMPARISON:  5/21/2015 EXAM PERFORMED/VIEWS:  XR CHEST PORTABLE FINDINGS: Cardiomediastinal silhouette appears unremarkable  Bilateral parahilar opacities and interstitial opacities throughout both lungs  No consolidation  No evidence of a pleural effusion or pneumothorax   Osseous structures appear within normal limits for patient age  Impression: Nonspecific bilateral lung opacities, possibly pulmonary edema and/or multifocal pneumonia  Workstation performed: [id number]     Ct Head Without Contrast    Result Date: 2/28/2021  Narrative: CT BRAIN - WITHOUT CONTRAST INDICATION:   dizziness  Patient has suspected COVID-19  COMPARISON:  None  TECHNIQUE:  CT examination of the brain was performed  In addition to axial images, sagittal and coronal 2D reformatted images were created and submitted for interpretation  Radiation dose length product (DLP) for this visit:  883 88 mGy-cm   This examination, like all CT scans performed in the [facility], was performed utilizing techniques to minimize radiation dose exposure, including the use of iterative  reconstruction and automated exposure control  IMAGE QUALITY:  Diagnostic  FINDINGS: PARENCHYMA: Decreased attenuation is noted in periventricular and subcortical white matter demonstrating an appearance that is statistically most likely to represent mild microangiopathic change  No CT signs of acute infarction  No intracranial mass, mass effect or midline shift  No acute parenchymal hemorrhage  VENTRICLES AND EXTRA-AXIAL SPACES:  Normal for the patient's age  VISUALIZED ORBITS AND PARANASAL SINUSES:  No acute abnormality involving the orbits  Mild scattered sinus mucosal thickening is noted  No fluid levels are seen  CALVARIUM AND EXTRACRANIAL SOFT TISSUES:  Normal      Impression: No acute intracranial abnormality  Workstation performed: [id number]     EKG, Pathology, and Other Studies: I have personally reviewed the results  VTE Pharmacologic Prophylaxis: Enoxaparin (Lovenox)  VTE Mechanical Prophylaxis: sequential compression device    Code Status: Level 1 - Full Code    Counseling / Coordination of Care  Total floor / unit time spent today 75 minutes    Greater than 50% of total time was spent with the patient and / or family counseling and / or coordination of care       "This note has been constructed using a voice recognition system"      Konrad Altamirano MD  3/5/2021, 6:14 PM

## 2021-03-05 NOTE — ASSESSMENT & PLAN NOTE
Patient was recently discharged from [place] [address]  on March 3rd with type 1 MI  Patient had troponin greater than 40 during that admission  Patient underwent cardiac catheterization which showed severe three-vessel CAD, s/p PCI of LAD  Cardiomyopathy with severely reduced LV systolic dysfunction ejection fraction 24% with grade 3 diastolic dysfunction  Patient was not considered a good candidate for CABG because of his comorbidities    Continue on medical management  Patient had LifeVest placed  On aspirin, Brilinta, Lipitor, Toprol-XL  Denies any chest pain or shortness of breath

## 2021-03-05 NOTE — ED PROVIDER NOTES
History  Chief Complaint   Patient presents with    Gait Problem     To ED with issac for placement  States that patient was discharged from the hospital 2 days ago and is to weak and needs help  States that she was referred for patient to be placed in a rehab or admitted to the hospital by provider  Patient has no complaints and transferred to bed with one assist       This 53-year-old man was brought here from his brother's house by his niece today  She states that he is unsafe there and should not ever have been sent home  He was admitted to [facility] February 28th to March 3rd with chest pain and 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  Due to his comorbidities he was not considered a candidate for CABG  He had multiple stents placed  He has a new low ejection fraction of 23-24%  He wears a Life Vest now  Has history of hypertension, hyperlipidemia, diabetes type 2, osteoporosis, non ST-elevation MI and smokes a pipe  The patient says that he has not fallen at his brother's house since discharge  He gets around the house with a cane  He has been able to get to the bathroom and to the dinner table, etc  He does admit to being very unstable on his feet and indeed when we helped him get from wheelchair to stretcher he needed a lot of assistance from 2 people to get to the stretcher  He denies any recent illness and any skin breakdown  Looks well dressed, is clean and very coherent  He and his niece brought his cane and his extra Life Vest battery and   Prior to Admission Medications   Prescriptions Last Dose Informant Patient Reported? Taking?    alendronate (FOSAMAX) 70 mg tablet  Self Yes No   Sig: once a week Takes on saturdays   aspirin 81 MG tablet  Self Yes No   Sig: Take 81 mg by mouth daily    atorvastatin (LIPITOR) 40 mg tablet   No No   Sig: Take 1 tablet (40 mg total) by mouth daily   cholecalciferol (VITAMIN D3) 1,000 units tablet  Self Yes No   Sig: Take by mouth daily    cyanocobalamin (VITAMIN B-12) 1,000 mcg tablet  Self Yes No   Sig: Take 1,000 mcg by mouth daily    ergocalciferol (VITAMIN D2) 50,000 units  Self Yes No   Sig: Take 50,000 Units by mouth once a week wednesdays   glipiZIDE (GLIPIZIDE XL) 10 mg 24 hr tablet  Self Yes No   Sig: Take 10 mg by mouth daily    insulin NPH-insulin regular (NOVOLIN 70/30 RELION) 100 units/mL subcutaneous injection  Self Yes No   Sig: Inject 15 Units under the skin 2 (two) times a day     metoprolol succinate (TOPROL-XL) 25 mg 24 hr tablet   No No   Sig: Take 0 5 tablets (12 5 mg total) by mouth daily   nicotine (NICODERM CQ) 7 mg/24hr TD 24 hr patch Not Taking at Unknown time  No No   Sig: Place 1 patch on the skin daily   Patient not taking: Reported on 3/5/2021   nitroglycerin (NITROSTAT) 0 4 mg SL tablet   No No   Sig: Place 1 tablet (0 4 mg total) under the tongue every 5 (five) minutes as needed for chest pain   pioglitazone (ACTOS) 45 mg tablet  Self Yes No   Sig: Take 45 mg by mouth daily    ticagrelor (BRILINTA) 90 MG   No No   Sig: Take 1 tablet (90 mg total) by mouth every 12 (twelve) hours      Facility-Administered Medications: None       Past Medical History:   Diagnosis Date    Arthritis     Diabetes mellitus ([facility] [address] )     Hyperlipidemia     Osteoporosis        Past Surgical History:   Procedure Laterality Date    TOTAL HIP ARTHROPLASTY         Family History   Problem Relation Age of Onset    No Known Problems Mother     Arthritis Father     Diabetes unspecified Brother      I have reviewed and agree with the history as documented      E-Cigarette/Vaping    E-Cigarette Use Never User      E-Cigarette/Vaping Substances    Nicotine No     Flavoring No      Social History     Tobacco Use    Smoking status: Current Every Day Smoker     Types: Pipe    Smokeless tobacco: Never Used   Substance Use Topics    Alcohol use: Yes     Frequency: 4 or more times a week     Comment: pt states he usually has a small glass of wine most evenings    Drug use: No       Review of Systems   Constitutional: Positive for activity change  HENT: Negative  Eyes: Negative  Respiratory: Negative  Cardiovascular: Negative  Gastrointestinal: Negative  Endocrine: Negative  Genitourinary: Negative  Musculoskeletal: Negative  Skin: Negative  Allergic/Immunologic: Negative  Neurological: Positive for weakness  Negative for dizziness, seizures, syncope, facial asymmetry, speech difficulty, light-headedness and headaches  Hematological: Negative  Psychiatric/Behavioral: Negative  All other systems reviewed and are negative  Physical Exam  Physical Exam  Vitals signs and nursing note reviewed  Constitutional:       General: He is not in acute distress  Appearance: He is well-developed and normal weight  He is ill-appearing  He is not toxic-appearing or diaphoretic  HENT:      Head: Normocephalic and atraumatic  Right Ear: External ear normal       Left Ear: External ear normal       Nose: Nose normal       Mouth/Throat:      Mouth: Mucous membranes are moist       Pharynx: Oropharynx is clear  Eyes:      General: No scleral icterus  Conjunctiva/sclera: Conjunctivae normal       Pupils: Pupils are equal, round, and reactive to light  Neck:      Musculoskeletal: Normal range of motion and neck supple  No neck rigidity or muscular tenderness  Vascular: No JVD  Cardiovascular:      Rate and Rhythm: Normal rate and regular rhythm  Pulses: Normal pulses  Heart sounds: Normal heart sounds  No murmur  Comments: Life vest is in place  Low battery warning came up and battery was replaced and old battery placed into wall  by ED staff  Pulmonary:      Effort: Pulmonary effort is normal       Breath sounds: Normal breath sounds  Abdominal:      General: Bowel sounds are normal       Palpations: Abdomen is soft  There is no mass  Tenderness:  There is no abdominal tenderness  There is no guarding or rebound  Musculoskeletal: Normal range of motion  General: No tenderness or signs of injury  Right lower leg: No edema  Left lower leg: No edema  Lymphadenopathy:      Cervical: No cervical adenopathy  Skin:     General: Skin is warm and dry  Capillary Refill: Capillary refill takes less than 2 seconds  Coloration: Skin is not jaundiced or pale  Findings: No rash  Neurological:      General: No focal deficit present  Mental Status: He is alert and oriented to person, place, and time  Mental status is at baseline  Cranial Nerves: No cranial nerve deficit  Sensory: No sensory deficit  Motor: Weakness present  Coordination: Coordination normal       Gait: Gait abnormal       Deep Tendon Reflexes: Reflexes are normal and symmetric     Psychiatric:         Mood and Affect: Mood normal          Behavior: Behavior normal          Vital Signs  ED Triage Vitals [03/05/21 1640]   Temperature Pulse Respirations Blood Pressure SpO2   98 2 °F (36 8 °C) 72 20 123/70 100 %      Temp Source Heart Rate Source Patient Position - Orthostatic VS BP Location FiO2 (%)   Tympanic Monitor Lying Right arm --      Pain Score       --           Vitals:    03/05/21 1640 03/05/21 1645   BP: 123/70 116/55   Pulse: 72 73   Patient Position - Orthostatic VS: Lying          Visual Acuity      ED Medications  Medications   aspirin (ECOTRIN LOW STRENGTH) EC tablet 81 mg (has no administration in time range)   atorvastatin (LIPITOR) tablet 40 mg (has no administration in time range)   cholecalciferol (VITAMIN D3) tablet 1,000 Units (has no administration in time range)   cyanocobalamin (VITAMIN B-12) tablet 1,000 mcg (has no administration in time range)   insulin aspart protamine-insulin aspart (NovoLOG 70/30) 100 units/mL subcutaneous injection 15 Units (has no administration in time range)   metoprolol succinate (TOPROL-XL) 24 hr tablet 12 5 mg (has no administration in time range)   nitroglycerin (NITROSTAT) SL tablet 0 4 mg (has no administration in time range)   ticagrelor (BRILINTA) tablet 90 mg (has no administration in time range)   acetaminophen (TYLENOL) tablet 650 mg (has no administration in time range)   nicotine (NICODERM CQ) 7 mg/24hr TD 24 hr patch 1 patch (has no administration in time range)   ondansetron (ZOFRAN) injection 4 mg (has no administration in time range)   enoxaparin (LOVENOX) subcutaneous injection 40 mg (has no administration in time range)   insulin lispro (HumaLOG) 100 units/mL subcutaneous injection 2-12 Units (has no administration in time range)       Diagnostic Studies  Results Reviewed     Procedure Component Value Units Date/Time    UA w Reflex to Microscopic w Reflex to Culture [[id number]]     Lab Status: No result Specimen: Urine     COVID19, Influenza A/B, RSV PCR, [facility] [[id number]] Collected: 03/05/21 1727    Lab Status:  In process Specimen: Nasopharyngeal Swab Updated: 03/05/21 1731    Protime-INR [[id number]]  (Abnormal) Collected: 03/05/21 1653    Lab Status: Final result Specimen: Blood from Arm, Right Updated: 03/05/21 1720     Protime 15 1 seconds      INR 1 19    APTT [[id number]]  (Normal) Collected: 03/05/21 1653    Lab Status: Final result Specimen: Blood from Arm, Right Updated: 03/05/21 1720     PTT 29 seconds     Basic metabolic panel [[id number]]  (Abnormal) Collected: 03/05/21 1653    Lab Status: Final result Specimen: Blood from Arm, Right Updated: 03/05/21 1716     Sodium 138 mmol/L      Potassium 4 4 mmol/L      Chloride 102 mmol/L      CO2 29 mmol/L      ANION GAP 7 mmol/L      BUN 28 mg/dL      Creatinine 1 00 mg/dL      Glucose 183 mg/dL      Calcium 8 9 mg/dL      eGFR 72 ml/min/1 73sq m     Narrative:      Tony guidelines for Chronic Kidney Disease (CKD):     Stage 1 with normal or high GFR (GFR > 90 mL/min/1 73 square meters)    Stage 2 Mild CKD (GFR = 60-89 mL/min/1 73 square meters)    Stage 3A Moderate CKD (GFR = 45-59 mL/min/1 73 square meters)    Stage 3B Moderate CKD (GFR = 30-44 mL/min/1 73 square meters)    Stage 4 Severe CKD (GFR = 15-29 mL/min/1 73 square meters)    Stage 5 End Stage CKD (GFR <15 mL/min/1 73 square meters)  Note: GFR calculation is accurate only with a steady state creatinine    CBC and differential [[id number]]  (Abnormal) Collected: 03/05/21 1653    Lab Status: Final result Specimen: Blood from Arm, Right Updated: 03/05/21 1702     WBC 6 38 Thousand/uL      RBC 4 14 Million/uL      Hemoglobin 13 1 g/dL      Hematocrit 41 3 %       fL      MCH 31 6 pg      MCHC 31 7 g/dL      RDW 13 6 %      MPV 11 1 fL      Platelets 472 Thousands/uL      nRBC 0 /100 WBCs      Neutrophils Relative 70 %      Immat GRANS % 1 %      Lymphocytes Relative 10 %      Monocytes Relative 12 %      Eosinophils Relative 6 %      Basophils Relative 1 %      Neutrophils Absolute 4 43 Thousands/µL      Immature Grans Absolute 0 06 Thousand/uL      Lymphocytes Absolute 0 64 Thousands/µL      Monocytes Absolute 0 79 Thousand/µL      Eosinophils Absolute 0 41 Thousand/µL      Basophils Absolute 0 05 Thousands/µL                  XR chest 1 view portable   ED Interpretation by Greg Peguero DO (03/05 1753)   Life vest in place  No acute disease  Procedures  Procedures         ED Course  ED Course as of Mar 05 1818   Fri Mar 05, 2021   1701  Review of the epic record does not contain any correspondence from case management today  I texted the [facility]  attending regarding disposition  MDM  Number of Diagnoses or Management Options  Ambulatory dysfunction: established and worsening  Coronary artery disease:   Ischemic cardiomyopathy:   Diagnosis management comments:  Elderly frail male with multiple medical problems as ambulated Bouse dysfunction    He initially refused placement for rehab but family now is concerned that he is at risk of injuring himself living at home with his brother  PT and OT are not available at this hour at this hospital   Case discussed with internal medicine who also spoke to patient's family  They feel unsafe bringing him home  Patient will be admitted  Patient remained stable here  Amount and/or Complexity of Data Reviewed  Clinical lab tests: ordered and reviewed  Tests in the radiology section of CPT®: ordered and reviewed  Review and summarize past medical records: yes  Discuss the patient with other providers: yes        Disposition  Final diagnoses:   Ambulatory dysfunction   Coronary artery disease   Ischemic cardiomyopathy     Time reflects when diagnosis was documented in both MDM as applicable and the Disposition within this note     Time User Action Codes Description Comment    3/5/2021  5:53 PM Arliss Cones Add [R26 2] Ambulatory dysfunction     3/5/2021  5:53 PM Arliss Cones Add [I25 10] Coronary artery disease     3/5/2021  5:53 PM Arliss Cones Add [I25 5] Ischemic cardiomyopathy       ED Disposition     ED Disposition Condition Date/Time Comment    Admit Stable Fri Mar 5, 2021  5:53 PM Case was discussed with Dr Dulce Varela and the patient's admission status was agreed to be Admission Status: inpatient status to the service of Dr Dulce Varela   Follow-up Information    None         Patient's Medications   Discharge Prescriptions    No medications on file     No discharge procedures on file      PDMP Review     None          ED Provider  Electronically Signed by           Neda Barclay DO  03/05/21 7212

## 2021-03-06 PROBLEM — N39.0 UTI (URINARY TRACT INFECTION): Status: ACTIVE | Noted: 2021-03-06

## 2021-03-06 LAB
ALBUMIN SERPL BCP-MCNC: 2.4 G/DL (ref 3.5–5)
ALP SERPL-CCNC: 108 U/L (ref 46–116)
ALT SERPL W P-5'-P-CCNC: 70 U/L (ref 12–78)
ANION GAP SERPL CALCULATED.3IONS-SCNC: 7 MMOL/L (ref 4–13)
AST SERPL W P-5'-P-CCNC: 45 U/L (ref 5–45)
BASOPHILS # BLD AUTO: 0.05 THOUSANDS/ΜL (ref 0–0.1)
BASOPHILS NFR BLD AUTO: 1 % (ref 0–1)
BILIRUB SERPL-MCNC: 0.4 MG/DL (ref 0.2–1)
BUN SERPL-MCNC: 22 MG/DL (ref 5–25)
CALCIUM ALBUM COR SERPL-MCNC: 9.7 MG/DL (ref 8.3–10.1)
CALCIUM SERPL-MCNC: 8.4 MG/DL (ref 8.3–10.1)
CHLORIDE SERPL-SCNC: 104 MMOL/L (ref 100–108)
CO2 SERPL-SCNC: 27 MMOL/L (ref 21–32)
CREAT SERPL-MCNC: 0.91 MG/DL (ref 0.6–1.3)
EOSINOPHIL # BLD AUTO: 0.48 THOUSAND/ΜL (ref 0–0.61)
EOSINOPHIL NFR BLD AUTO: 8 % (ref 0–6)
ERYTHROCYTE [DISTWIDTH] IN BLOOD BY AUTOMATED COUNT: 13.4 % (ref 11.6–15.1)
GFR SERPL CREATININE-BSD FRML MDRD: 80 ML/MIN/1.73SQ M
GLUCOSE SERPL-MCNC: 172 MG/DL (ref 65–140)
GLUCOSE SERPL-MCNC: 195 MG/DL (ref 65–140)
GLUCOSE SERPL-MCNC: 219 MG/DL (ref 65–140)
GLUCOSE SERPL-MCNC: 241 MG/DL (ref 65–140)
GLUCOSE SERPL-MCNC: 301 MG/DL (ref 65–140)
HCT VFR BLD AUTO: 37.1 % (ref 36.5–49.3)
HGB BLD-MCNC: 11.9 G/DL (ref 12–17)
IMM GRANULOCYTES # BLD AUTO: 0.04 THOUSAND/UL (ref 0–0.2)
IMM GRANULOCYTES NFR BLD AUTO: 1 % (ref 0–2)
INR PPP: 1.24 (ref 0.84–1.19)
LYMPHOCYTES # BLD AUTO: 0.73 THOUSANDS/ΜL (ref 0.6–4.47)
LYMPHOCYTES NFR BLD AUTO: 12 % (ref 14–44)
MAGNESIUM SERPL-MCNC: 2 MG/DL (ref 1.6–2.6)
MCH RBC QN AUTO: 31.7 PG (ref 26.8–34.3)
MCHC RBC AUTO-ENTMCNC: 32.1 G/DL (ref 31.4–37.4)
MCV RBC AUTO: 99 FL (ref 82–98)
MONOCYTES # BLD AUTO: 0.64 THOUSAND/ΜL (ref 0.17–1.22)
MONOCYTES NFR BLD AUTO: 10 % (ref 4–12)
NEUTROPHILS # BLD AUTO: 4.24 THOUSANDS/ΜL (ref 1.85–7.62)
NEUTS SEG NFR BLD AUTO: 68 % (ref 43–75)
NRBC BLD AUTO-RTO: 0 /100 WBCS
PHOSPHATE SERPL-MCNC: 3.6 MG/DL (ref 2.3–4.1)
PLATELET # BLD AUTO: 237 THOUSANDS/UL (ref 149–390)
PMV BLD AUTO: 10.8 FL (ref 8.9–12.7)
POTASSIUM SERPL-SCNC: 4 MMOL/L (ref 3.5–5.3)
PROT SERPL-MCNC: 6.8 G/DL (ref 6.4–8.2)
PROTHROMBIN TIME: 15.6 SECONDS (ref 11.6–14.5)
RBC # BLD AUTO: 3.75 MILLION/UL (ref 3.88–5.62)
SODIUM SERPL-SCNC: 138 MMOL/L (ref 136–145)
WBC # BLD AUTO: 6.18 THOUSAND/UL (ref 4.31–10.16)

## 2021-03-06 PROCEDURE — 82948 REAGENT STRIP/BLOOD GLUCOSE: CPT

## 2021-03-06 PROCEDURE — 85025 COMPLETE CBC W/AUTO DIFF WBC: CPT | Performed by: INTERNAL MEDICINE

## 2021-03-06 PROCEDURE — 84100 ASSAY OF PHOSPHORUS: CPT | Performed by: INTERNAL MEDICINE

## 2021-03-06 PROCEDURE — 83735 ASSAY OF MAGNESIUM: CPT | Performed by: INTERNAL MEDICINE

## 2021-03-06 PROCEDURE — 80053 COMPREHEN METABOLIC PANEL: CPT | Performed by: INTERNAL MEDICINE

## 2021-03-06 PROCEDURE — 99232 SBSQ HOSP IP/OBS MODERATE 35: CPT | Performed by: INTERNAL MEDICINE

## 2021-03-06 PROCEDURE — 85610 PROTHROMBIN TIME: CPT | Performed by: INTERNAL MEDICINE

## 2021-03-06 RX ORDER — CEFTRIAXONE 1 G/50ML
1000 INJECTION, SOLUTION INTRAVENOUS EVERY 24 HOURS
Status: DISCONTINUED | OUTPATIENT
Start: 2021-03-06 | End: 2021-03-08

## 2021-03-06 RX ADMIN — ENOXAPARIN SODIUM 40 MG: 40 INJECTION SUBCUTANEOUS at 08:12

## 2021-03-06 RX ADMIN — INSULIN LISPRO 8 UNITS: 100 INJECTION, SOLUTION INTRAVENOUS; SUBCUTANEOUS at 12:13

## 2021-03-06 RX ADMIN — CEFTRIAXONE 1000 MG: 1 INJECTION, SOLUTION INTRAVENOUS at 08:20

## 2021-03-06 RX ADMIN — INSULIN LISPRO 4 UNITS: 100 INJECTION, SOLUTION INTRAVENOUS; SUBCUTANEOUS at 21:42

## 2021-03-06 RX ADMIN — ASPIRIN 81 MG: 81 TABLET, COATED ORAL at 08:13

## 2021-03-06 RX ADMIN — CYANOCOBALAMIN TAB 500 MCG 1000 MCG: 500 TAB at 08:14

## 2021-03-06 RX ADMIN — TICAGRELOR 90 MG: 90 TABLET ORAL at 21:42

## 2021-03-06 RX ADMIN — INSULIN ASPART 15 UNITS: 100 INJECTION, SUSPENSION SUBCUTANEOUS at 08:12

## 2021-03-06 RX ADMIN — INSULIN LISPRO 4 UNITS: 100 INJECTION, SOLUTION INTRAVENOUS; SUBCUTANEOUS at 16:26

## 2021-03-06 RX ADMIN — INSULIN ASPART 15 UNITS: 100 INJECTION, SUSPENSION SUBCUTANEOUS at 16:26

## 2021-03-06 RX ADMIN — INSULIN LISPRO 2 UNITS: 100 INJECTION, SOLUTION INTRAVENOUS; SUBCUTANEOUS at 08:12

## 2021-03-06 RX ADMIN — NICOTINE 7 MG/24 HR DAILY TRANSDERMAL PATCH 1 PATCH: at 08:15

## 2021-03-06 RX ADMIN — ATORVASTATIN CALCIUM 40 MG: 40 TABLET, FILM COATED ORAL at 08:14

## 2021-03-06 RX ADMIN — Medication 1000 UNITS: at 08:15

## 2021-03-06 RX ADMIN — TICAGRELOR 90 MG: 90 TABLET ORAL at 08:14

## 2021-03-06 NOTE — UTILIZATION REVIEW
Initial Clinical Review    Admission: Date/Time/Statement:   Admission Orders (From admission, onward)     Ordered        03/05/21 1754  Inpatient Admission  Once                   Orders Placed This Encounter   Procedures    Inpatient Admission     Standing Status:   Standing     Number of Occurrences:   1     Order Specific Question:   Level of Care     Answer:   Med Surg [16]     Order Specific Question:   Estimated length of stay     Answer:   More than 2 Midnights     Order Specific Question:   Certification     Answer:   I certify that inpatient services are medically necessary for this patient for a duration of greater than two midnights  See H&P and MD Progress Notes for additional information about the patient's course of treatment  ED Arrival Information     Expected Arrival Acuity Means of Arrival Escorted By Service Admission Type    - 3/5/2021 16:20 Less Urgent Wheelchair Family Member General Medicine Urgent    Arrival Complaint    gait  dysfunction        Chief Complaint   Patient presents with    Gait Problem     To ED with neice for placement  States that patient was discharged from the hospital 2 days ago and is to weak and needs help  States that she was referred for patient to be placed in a rehab or admitted to the hospital by provider  Patient has no complaints and transferred to bed with one assist      Assessment/Plan: 69 yo m with cardiomyopathy ef 24%, CAD recent non STEMI type 1, he has a life vest,  DM, HTN, HLD  He had a recent admission to [facility]( 2/28-3/3 )   was discharged  to his brother's home  He is brought to the ED by his niece because he is unsafe due to ambulatory dysfunction and high risk for falls, she spoke to his PC and a  and was advised to bring him to the ED for rehab placement  He required an assist of 2  to get to the stretcher from his wheelchair  He is admitted inpatient due to ambulatory dysfunction,           ED Triage Vitals   Temperature Pulse Respirations Blood Pressure SpO2   03/05/21 1640 03/05/21 1640 03/05/21 1640 03/05/21 1640 03/05/21 1640   98 2 °F (36 8 °C) 72 20 123/70 100 %      Temp Source Heart Rate Source Patient Position - Orthostatic VS BP Location FiO2 (%)   03/05/21 1640 03/05/21 1640 03/05/21 1640 03/05/21 1640 --   Tympanic Monitor Lying Right arm       Pain Score       03/05/21 2014       No Pain          Wt Readings from Last 1 Encounters:   03/06/21 82 6 kg (182 lb 1 6 oz)     Additional Vital Signs:   Date/Time  Temp  Pulse  Resp  BP  MAP (mmHg)  SpO2  O2 Device  Patient Position - Orthostatic VS   03/06/21 0815  --  --  --  --  --  --  None (Room air)  --   03/06/21 08:13:25  --  74  --  100/50  67  95 %  --  --   03/06/21 07:34:29  98 8 °F (37 1 °C)  83  18  109/48Abnormal   68  94 %  --  --   03/05/21 21:04:49  97 6 °F (36 4 °C)  72  --  121/65  84  98 %  --  --   03/05/21 2014  --  --  --  --  --  --  None (Room air)  --   03/05/21 20:02:47  --  73  --  --  --  96 %  --  --   03/05/21 19:45:13  97 1 °F (36 2 °C)Abnormal   73  16  124/66  85  --  --  --   03/05/21 19:44:21  --  --  --  124/66  85  --  --  --   03/05/21 1645  --  73  --  116/55  79  95 %  --               Pertinent Labs/Diagnostic Test Results:   Results from last 7 days   Lab Units 03/05/21  1727 02/28/21  0110   SARS-COV-2  Negative Negative     Results from last 7 days   Lab Units 03/06/21  0554 03/05/21  1653 03/01/21  0518 02/28/21  0106   WBC Thousand/uL 6 18 6 38 7 02 7 15   HEMOGLOBIN g/dL 11 9* 13 1 12 6 13 7   HEMATOCRIT % 37 1 41 3 38 3 44 0   PLATELETS Thousands/uL 237 252 210 231   NEUTROS ABS Thousands/µL 4 24 4 43  --  5 29         Results from last 7 days   Lab Units 03/06/21  0554 03/05/21  1653 03/01/21  0518 02/28/21  0106   SODIUM mmol/L 138 138 135* 137   POTASSIUM mmol/L 4 0 4 4 4 3 4 5   CHLORIDE mmol/L 104 102 103 101   CO2 mmol/L 27 29 26 24   ANION GAP mmol/L 7 7 6 12   BUN mg/dL 22 28* 31* 25   CREATININE mg/dL 0 91 1 00 1 19 1 16 EGFR ml/min/1 73sq m 80 72 58 60   CALCIUM mg/dL 8 4 8 9 9 2 9 0   MAGNESIUM mg/dL 2 0  --   --   --    PHOSPHORUS mg/dL 3 6  --   --   --      Results from last 7 days   Lab Units 03/06/21  0554 03/01/21  0518 02/28/21  0106   AST U/L 45 45 83*   ALT U/L 70 39 61   ALK PHOS U/L 108 99 120*   TOTAL PROTEIN g/dL 6 8 6 9 7 9   ALBUMIN g/dL 2 4* 2 8* 3 4*   TOTAL BILIRUBIN mg/dL 0 40 0 50 1 00     Results from last 7 days   Lab Units 03/06/21  1150 03/06/21  0733 03/05/21  2101 03/03/21  1146 03/03/21  0733 03/02/21  2106 03/02/21  2045 03/02/21  1636 03/02/21  1127 03/02/21  0739 03/01/21  2036 03/01/21  1859   POC GLUCOSE mg/dl 301* 195* 383* 278*  278* 196*  196* 200* 200* 210* 254* 221* 176* 132     Results from last 7 days   Lab Units 03/06/21  0554 03/05/21  1653 03/01/21  0518 02/28/21  0106   GLUCOSE RANDOM mg/dL 172* 183* 208* 117         Results from last 7 days   Lab Units 02/28/21  1416   HEMOGLOBIN A1C % 7 5*   EAG mg/dl 169         Results from last 7 days   Lab Units 02/28/21  0106   CK TOTAL U/L 426*   CK MB INDEX % 7 3*   CK MB ng/mL 31 0*     Results from last 7 days   Lab Units 03/01/21  0518 02/28/21  1809 02/28/21  1416 02/28/21  0821 02/28/21  0405 02/28/21  0106   TROPONIN I ng/mL 30 86* 31 55* 33 98* 38 91* 38 97* >40 00*         Results from last 7 days   Lab Units 03/06/21  0554 03/05/21  1653 03/02/21  0716 03/01/21  0518  02/28/21  0106   PROTIME seconds 15 6* 15 1*  --   --   --  14 9*   INR  1 24* 1 19  --   --   --  1 17   PTT seconds  --  29 27 65*   < > 27    < > = values in this interval not displayed       Results from last 7 days   Lab Units 02/28/21  1416   TSH 3RD GENERATON uIU/mL 1 587       Results from last 7 days   Lab Units 02/28/21  0106   NT-PRO BNP pg/mL 5,602*     Results from last 7 days   Lab Units 02/28/21  1416   HEP C AB  Non-reactive       Results from last 7 days   Lab Units 03/05/21  2046 02/28/21  0215   CLARITY UA  Clear Clear   COLOR UA  Yellow Straw   SPEC GRAV UA  1 020 1 020   PH UA  5 5 5 5   GLUCOSE UA mg/dl Negative Negative   KETONES UA mg/dl Negative 15 (1+)*   BLOOD UA  Negative Negative   PROTEIN UA mg/dl Negative Negative   NITRITE UA  Negative Negative   BILIRUBIN UA  Negative Negative   UROBILINOGEN UA E U /dl >=8 0* 0 2   LEUKOCYTES UA  Trace* Negative   WBC UA /hpf 10-20*  --    RBC UA /hpf None Seen  --    BACTERIA UA /hpf Moderate*  --    EPITHELIAL CELLS WET PREP /hpf Occasional  --    MUCUS THREADS  None Seen  --      Results from last 7 days   Lab Units 03/05/21  1727 02/28/21  0110   INFLUENZA A PCR  Negative Negative   INFLUENZA B PCR  Negative Negative   RSV PCR  Negative Negative           Past Medical History:   Diagnosis Date    Arthritis     Diabetes mellitus ([place] [address] )     Hyperlipidemia     NSTEMI (non-ST elevated myocardial infarction) ([place] [address] )     Osteoporosis      Present on Admission:   Hyperlipidemia   Hyperglycemia due to type 2 diabetes mellitus ([place] [address] )   Hypertension   Smokes a pipe      Admitting Diagnosis: Coronary artery disease [I25 10]  Ischemic cardiomyopathy [I25 5]  Difficulty walking [R26 2]  Ambulatory dysfunction [R26 2]  Age/Sex: 68 y o  male       Admission Orders:  Scheduled Medications:  aspirin, 81 mg, Oral, Daily  atorvastatin, 40 mg, Oral, Daily  cefTRIAXone, 1,000 mg, Intravenous, Q24H  cholecalciferol, 1,000 Units, Oral, Daily  vitamin B-12, 1,000 mcg, Oral, Daily  enoxaparin, 40 mg, Subcutaneous, Daily  insulin aspart protamine-insulin aspart, 15 Units, Subcutaneous, BID AC  insulin lispro, 2-12 Units, Subcutaneous, TID AC  metoprolol succinate, 12 5 mg, Oral, Daily  nicotine, 1 patch, Transdermal, Daily  ticagrelor, 90 mg, Oral, Q12H ROEL      Continuous IV Infusions:     PRN Meds:  acetaminophen, 650 mg, Oral, Q6H PRN  nitroglycerin, 0 4 mg, Sublingual, Q5 Min PRN  ondansetron, 4 mg, Intravenous, Q6H PRN    Nursing Orders -  VS - I & O q shift - daily weights - SCD's to le's-  Diet cons carb - PT/OT eval Network Utilization Review Department  ATTENTION: Please call with any questions or concerns to [phone] and carefully listen to the prompts so that you are directed to the right person  All voicemails are confidential   Jennifer Ivory all requests for admission clinical reviews, approved or denied determinations and any other requests to dedicated fax number below belonging to the campus where the patient is receiving treatment   List of dedicated fax numbers for the Facilities:  [address] DENIALS (Administrative/Medical Necessity) [phone]   [address] (Maternity/NICU/Pediatrics) [phone]   [address] [place] [address] Dr Inez Schaefer [id number] (  Con [address] "Sanjana" 103) [address] [address] Arlette Jules 1481 [address] [place] [id number] [phone]

## 2021-03-06 NOTE — ASSESSMENT & PLAN NOTE
Lab Results   Component Value Date    HGBA1C 7 5 (H) 02/28/2021       Recent Labs     03/03/21  0733 03/03/21  1146 03/05/21  2101   POCGLU 196*  196* 278*  278* 383*       Blood Sugar Average: Last 72 hrs:  (P) 383 Continue on NovoLog 70/30 15 units SC b i d   Accu-Chek a c  HS with Humalog sliding scale

## 2021-03-06 NOTE — ASSESSMENT & PLAN NOTE
Patient was recently discharged from [place] [address] on March 3rd with type 1 MI  Patient had troponin greater than 40 during that admission  Patient underwent cardiac catheterization which showed severe three-vessel CAD, s/p PCI of LAD  Cardiomyopathy with severely reduced LV systolic dysfunction ejection fraction 24% with grade 3 diastolic dysfunction  Patient was not considered a good candidate for CABG because of his comorbidities    Continue on medical management  Patient had LifeVest placed  On aspirin, Brilinta, Lipitor, Toprol-XL  Denies any chest pain or shortness of breath

## 2021-03-06 NOTE — PLAN OF CARE
Problem: Potential for Falls  Goal: Patient will remain free of falls  Description: INTERVENTIONS:  - Assess patient frequently for physical needs  -  Identify cognitive and physical deficits and behaviors that affect risk of falls    -  [place] fall precautions as indicated by assessment   - Educate patient/family on patient safety including physical limitations  - Instruct patient to call for assistance with activity based on assessment  - Modify environment to reduce risk of injury  - Consider OT/PT consult to assist with strengthening/mobility  Outcome: Progressing     Problem: Prexisting or High Potential for Compromised Skin Integrity  Goal: Skin integrity is maintained or improved  Description: INTERVENTIONS:  - Identify patients at risk for skin breakdown  - Assess and monitor skin integrity  - Assess and monitor nutrition and hydration status  - Monitor labs   - Assess for incontinence   - Turn and reposition patient  - Assist with mobility/ambulation  - Relieve pressure over bony prominences  - Avoid friction and shearing  - Provide appropriate hygiene as needed including keeping skin clean and dry  - Evaluate need for skin moisturizer/barrier cream  - Collaborate with interdisciplinary team   - Patient/family teaching  - Consider wound care consult   Outcome: Progressing     Problem: CARDIOVASCULAR - ADULT  Goal: Maintains optimal cardiac output and hemodynamic stability  Description: INTERVENTIONS:  - Monitor I/O, vital signs and rhythm  - Monitor for S/S and trends of decreased cardiac output  - Administer and titrate ordered vasoactive medications to optimize hemodynamic stability  - Assess quality of pulses, skin color and temperature  - Assess for signs of decreased coronary artery perfusion  - Instruct patient to report change in severity of symptoms  Outcome: Progressing     Problem: RESPIRATORY - ADULT  Goal: Achieves optimal ventilation and oxygenation  Description: INTERVENTIONS:  - Assess for changes in respiratory status  - Assess for changes in mentation and behavior  - Position to facilitate oxygenation and minimize respiratory effort  - Oxygen administered by appropriate delivery if ordered  - Initiate smoking cessation education as indicated  - Encourage broncho-pulmonary hygiene including cough, deep breathe, Incentive Spirometry  - Assess the need for suctioning and aspirate as needed  - Assess and instruct to report SOB or any respiratory difficulty  - Respiratory Therapy support as indicated  Outcome: Progressing     Problem: MUSCULOSKELETAL - ADULT  Goal: Maintain or return mobility to safest level of function  Description: INTERVENTIONS:  - Assess patient's ability to carry out ADLs; assess patient's baseline for ADL function and identify physical deficits which impact ability to perform ADLs (bathing, care of mouth/teeth, toileting, grooming, dressing, etc )  - Assess/evaluate cause of self-care deficits   - Assess range of motion  - Assess patient's mobility  - Assess patient's need for assistive devices and provide as appropriate  - Encourage maximum independence but intervene and supervise when necessary  - Involve family in performance of ADLs  - Assess for home care needs following discharge   - Consider OT consult to assist with ADL evaluation and planning for discharge  - Provide patient education as appropriate  Outcome: Progressing     Problem: PAIN - ADULT  Goal: Verbalizes/displays adequate comfort level or baseline comfort level  Description: Interventions:  - Encourage patient to monitor pain and request assistance  - Assess pain using appropriate pain scale  - Administer analgesics based on type and severity of pain and evaluate response  - Implement non-pharmacological measures as appropriate and evaluate response  - Consider cultural and social influences on pain and pain management  - Notify physician/advanced practitioner if interventions unsuccessful or patient reports new pain  Outcome: Progressing     Problem: SAFETY ADULT  Goal: Patient will remain free of falls  Description: INTERVENTIONS:  - Assess patient frequently for physical needs  -  Identify cognitive and physical deficits and behaviors that affect risk of falls    -  [place] fall precautions as indicated by assessment   - Educate patient/family on patient safety including physical limitations  - Instruct patient to call for assistance with activity based on assessment  - Modify environment to reduce risk of injury  - Consider OT/PT consult to assist with strengthening/mobility  Outcome: Progressing  Goal: Maintain or return to baseline ADL function  Description: INTERVENTIONS:  -  Assess patient's ability to carry out ADLs; assess patient's baseline for ADL function and identify physical deficits which impact ability to perform ADLs (bathing, care of mouth/teeth, toileting, grooming, dressing, etc )  - Assess/evaluate cause of self-care deficits   - Assess range of motion  - Assess patient's mobility; develop plan if impaired  - Assess patient's need for assistive devices and provide as appropriate  - Encourage maximum independence but intervene and supervise when necessary  - Involve family in performance of ADLs  - Assess for home care needs following discharge   - Consider OT consult to assist with ADL evaluation and planning for discharge  - Provide patient education as appropriate  Outcome: Progressing  Goal: Maintain or return mobility status to optimal level  Description: INTERVENTIONS:  - Assess patient's baseline mobility status (ambulation, transfers, stairs, etc )    - Identify cognitive and physical deficits and behaviors that affect mobility  - Identify mobility aids required to assist with transfers and/or ambulation (gait belt, sit-to-stand, lift, walker, cane, etc )  - [place] fall precautions as indicated by assessment  - Record patient progress and toleration of activity level on Mobility SBAR; progress patient to next Phase/Stage  - Instruct patient to call for assistance with activity based on assessment  - Consider rehabilitation consult to assist with strengthening/weightbearing, etc   Outcome: Progressing     Problem: DISCHARGE PLANNING  Goal: Discharge to home or other facility with appropriate resources  Description: INTERVENTIONS:  - Identify barriers to discharge w/patient and caregiver  - Arrange for needed discharge resources and transportation as appropriate  - Identify discharge learning needs (meds, wound care, etc )  - Arrange for interpretive services to assist at discharge as needed  - Refer to Case Management Department for coordinating discharge planning if the patient needs post-hospital services based on physician/advanced practitioner order or complex needs related to functional status, cognitive ability, or social support system  Outcome: Progressing     Problem: Knowledge Deficit  Goal: Patient/family/caregiver demonstrates understanding of disease process, treatment plan, medications, and discharge instructions  Description: Complete learning assessment and assess knowledge base    Interventions:  - Provide teaching at level of understanding  - Provide teaching via preferred learning methods  Outcome: Progressing

## 2021-03-06 NOTE — PLAN OF CARE
Problem: Potential for Falls  Goal: Patient will remain free of falls  Description: INTERVENTIONS:  - Assess patient frequently for physical needs  -  Identify cognitive and physical deficits and behaviors that affect risk of falls    -  [place] fall precautions as indicated by assessment   - Educate patient/family on patient safety including physical limitations  - Instruct patient to call for assistance with activity based on assessment  - Modify environment to reduce risk of injury  - Consider OT/PT consult to assist with strengthening/mobility  Outcome: Progressing     Problem: Prexisting or High Potential for Compromised Skin Integrity  Goal: Skin integrity is maintained or improved  Description: INTERVENTIONS:  - Identify patients at risk for skin breakdown  - Assess and monitor skin integrity  - Assess and monitor nutrition and hydration status  - Monitor labs   - Assess for incontinence   - Turn and reposition patient  - Assist with mobility/ambulation  - Relieve pressure over bony prominences  - Avoid friction and shearing  - Provide appropriate hygiene as needed including keeping skin clean and dry  - Evaluate need for skin moisturizer/barrier cream  - Collaborate with interdisciplinary team   - Patient/family teaching  - Consider wound care consult   Outcome: Progressing     Problem: CARDIOVASCULAR - ADULT  Goal: Maintains optimal cardiac output and hemodynamic stability  Description: INTERVENTIONS:  - Monitor I/O, vital signs and rhythm  - Monitor for S/S and trends of decreased cardiac output  - Administer and titrate ordered vasoactive medications to optimize hemodynamic stability  - Assess quality of pulses, skin color and temperature  - Assess for signs of decreased coronary artery perfusion  - Instruct patient to report change in severity of symptoms  Outcome: Progressing     Problem: RESPIRATORY - ADULT  Goal: Achieves optimal ventilation and oxygenation  Description: INTERVENTIONS:  - Assess for changes in respiratory status  - Assess for changes in mentation and behavior  - Position to facilitate oxygenation and minimize respiratory effort  - Oxygen administered by appropriate delivery if ordered  - Initiate smoking cessation education as indicated  - Encourage broncho-pulmonary hygiene including cough, deep breathe, Incentive Spirometry  - Assess the need for suctioning and aspirate as needed  - Assess and instruct to report SOB or any respiratory difficulty  - Respiratory Therapy support as indicated  Outcome: Progressing     Problem: MUSCULOSKELETAL - ADULT  Goal: Maintain or return mobility to safest level of function  Description: INTERVENTIONS:  - Assess patient's ability to carry out ADLs; assess patient's baseline for ADL function and identify physical deficits which impact ability to perform ADLs (bathing, care of mouth/teeth, toileting, grooming, dressing, etc )  - Assess/evaluate cause of self-care deficits   - Assess range of motion  - Assess patient's mobility  - Assess patient's need for assistive devices and provide as appropriate  - Encourage maximum independence but intervene and supervise when necessary  - Involve family in performance of ADLs  - Assess for home care needs following discharge   - Consider OT consult to assist with ADL evaluation and planning for discharge  - Provide patient education as appropriate  Outcome: Progressing     Problem: PAIN - ADULT  Goal: Verbalizes/displays adequate comfort level or baseline comfort level  Description: Interventions:  - Encourage patient to monitor pain and request assistance  - Assess pain using appropriate pain scale  - Administer analgesics based on type and severity of pain and evaluate response  - Implement non-pharmacological measures as appropriate and evaluate response  - Consider cultural and social influences on pain and pain management  - Notify physician/advanced practitioner if interventions unsuccessful or patient reports new pain  Outcome: Progressing     Problem: SAFETY ADULT  Goal: Patient will remain free of falls  Description: INTERVENTIONS:  - Assess patient frequently for physical needs  -  Identify cognitive and physical deficits and behaviors that affect risk of falls    -  [place] fall precautions as indicated by assessment   - Educate patient/family on patient safety including physical limitations  - Instruct patient to call for assistance with activity based on assessment  - Modify environment to reduce risk of injury  - Consider OT/PT consult to assist with strengthening/mobility  Outcome: Progressing  Goal: Maintain or return to baseline ADL function  Description: INTERVENTIONS:  -  Assess patient's ability to carry out ADLs; assess patient's baseline for ADL function and identify physical deficits which impact ability to perform ADLs (bathing, care of mouth/teeth, toileting, grooming, dressing, etc )  - Assess/evaluate cause of self-care deficits   - Assess range of motion  - Assess patient's mobility; develop plan if impaired  - Assess patient's need for assistive devices and provide as appropriate  - Encourage maximum independence but intervene and supervise when necessary  - Involve family in performance of ADLs  - Assess for home care needs following discharge   - Consider OT consult to assist with ADL evaluation and planning for discharge  - Provide patient education as appropriate  Outcome: Progressing  Goal: Maintain or return mobility status to optimal level  Description: INTERVENTIONS:  - Assess patient's baseline mobility status (ambulation, transfers, stairs, etc )    - Identify cognitive and physical deficits and behaviors that affect mobility  - Identify mobility aids required to assist with transfers and/or ambulation (gait belt, sit-to-stand, lift, walker, cane, etc )  - [place] fall precautions as indicated by assessment  - Record patient progress and toleration of activity level on Mobility SBAR; progress patient to next Phase/Stage  - Instruct patient to call for assistance with activity based on assessment  - Consider rehabilitation consult to assist with strengthening/weightbearing, etc   Outcome: Progressing     Problem: DISCHARGE PLANNING  Goal: Discharge to home or other facility with appropriate resources  Description: INTERVENTIONS:  - Identify barriers to discharge w/patient and caregiver  - Arrange for needed discharge resources and transportation as appropriate  - Identify discharge learning needs (meds, wound care, etc )  - Arrange for interpretive services to assist at discharge as needed  - Refer to Case Management Department for coordinating discharge planning if the patient needs post-hospital services based on physician/advanced practitioner order or complex needs related to functional status, cognitive ability, or social support system  Outcome: Progressing     Problem: Knowledge Deficit  Goal: Patient/family/caregiver demonstrates understanding of disease process, treatment plan, medications, and discharge instructions  Description: Complete learning assessment and assess knowledge base    Interventions:  - Provide teaching at level of understanding  - Provide teaching via preferred learning methods  Outcome: Progressing

## 2021-03-06 NOTE — PROGRESS NOTES
Progress Note - Melissa Mar 1940, 68 y o  male MRN: [id number]    Unit/Bed#: -01 Encounter: [id number]    Primary Care Provider: Jodi Fernandes MD   Date and time admitted to hospital: 3/5/2021  4:23 PM        Ambulatory dysfunction  Assessment & Plan  Patient is brought in by the family because of ambulatory dysfunction and high risk for falls  Family discussed with  who recommended coming to ER for rehab placement  P T /OT consult  Case management consult for skilled nursing rehab placement  Fall precautions    * UTI (urinary tract infection)  Assessment & Plan  Positive UA  Will start on Rocephin  Follow-up urine culture  Trend WBC and fever curve    Non-STEMI (non-ST elevated myocardial infarction) [facility])  Assessment & Plan  Patient was recently discharged from [place] [address]  on March 3rd with type 1 MI  Patient had troponin greater than 40 during that admission  Patient underwent cardiac catheterization which showed severe three-vessel CAD, s/p PCI of LAD  Cardiomyopathy with severely reduced LV systolic dysfunction ejection fraction 24% with grade 3 diastolic dysfunction  Patient was not considered a good candidate for CABG because of his comorbidities    Continue on medical management  Patient had LifeVest placed  On aspirin, Brilinta, Lipitor, Toprol-XL  Denies any chest pain or shortness of breath    Hyperglycemia due to type 2 diabetes mellitus [facility])  Assessment & Plan  Lab Results   Component Value Date    HGBA1C 7 5 (H) 02/28/2021       Recent Labs     03/03/21  0733 03/03/21  1146 03/05/21  2101   POCGLU 196*  196* 278*  278* 383*       Blood Sugar Average: Last 72 hrs:  (P) 383 Continue on NovoLog 70/30 15 units SC b i d   Accu-Chek a c  HS with Humalog sliding scale    Smokes a pipe  Assessment & Plan  Smoking cessation counseling given  On nicotine patch    Hypertension  Assessment & Plan  Continue on Toprol XL  Monitor vitals as per protocol    Hyperlipidemia  Assessment & Plan  On statin      Labs & Imaging: I have personally reviewed pertinent reports  VTE Pharmacologic Prophylaxis: Enoxaparin (Lovenox)  VTE Mechanical Prophylaxis: sequential compression device    Code Status:   Level 1 - Full Code    Patient Centered Rounds: I have performed bedside rounds with nursing staff today  Discussions with Specialists or Other Care Team Provider: CM    Education and Discussions with Family / Patient:  Dariela Hamm    Current Length of Stay: 1 day(s)    Current Patient Status: Inpatient   Certification Statement: The patient will continue to require additional inpatient hospital stay due to see my assessment and plan  Subjective:   Patient is seen and examined at bedside  Denies any new complaints  Afebrile  Denies any chest pain, shortness of breath or palpitations  LifeVest in place  All other ROS are negative  Objective:    Vitals: Blood pressure 121/65, pulse 72, temperature 97 6 °F (36 4 °C), resp  rate 16, height 5' 10" (1 778 m), weight 82 6 kg (182 lb 1 6 oz), SpO2 98 %  ,Body mass index is 26 13 kg/m²  SPO2 RA Rest      ED to Hosp-Admission (Current) from 3/5/2021 in [address] Med Surg Unit   SpO2  98 %   SpO2 Activity  At Rest   O2 Device  None (Room air)   O2 Flow Rate  --        I&O: No intake or output data in the 24 hours ending 03/06/21 0703    Physical Exam:    General- Alert, lying comfortably in bed  Not in any acute distress  Neck- Supple, No JVD  CVS- regular, S1 and S2 normal   Chest- Bilateral Air entry, No rhochi, crackles or wheezing present  Abdomen- soft, nontender, not distended, no guarding or rigidity, BS+  Extremities-  No pedal edema, No calf tenderness  Moving all 4 extremities  CNS-   Alert, awake and orientedx3  No focal deficits present      Invasive Devices     Peripheral Intravenous Line            Peripheral IV 03/05/21 Left Forearm less than 1 day Social History  reviewed  Family History   Problem Relation Age of Onset    No Known Problems Mother     Arthritis Father     Diabetes unspecified Brother     reviewed    Meds:  Current Facility-Administered Medications   Medication Dose Route Frequency Provider Last Rate Last Admin    acetaminophen (TYLENOL) tablet 650 mg  650 mg Oral Q6H PRN Ziggy Major MD        aspirin (ECOTRIN LOW STRENGTH) EC tablet 81 mg  81 mg Oral Daily Ziggy Major MD        atorvastatin (LIPITOR) tablet 40 mg  40 mg Oral Daily Ziggy Major MD        cefTRIAXone (ROCEPHIN) IVPB (premix in dextrose) 1,000 mg 50 mL  1,000 mg Intravenous Q24H Ziggy Major MD        cholecalciferol (VITAMIN D3) tablet 1,000 Units  1,000 Units Oral Daily Ziggy Major MD        cyanocobalamin (VITAMIN B-12) tablet 1,000 mcg  1,000 mcg Oral Daily Ziggy Major MD        enoxaparin (LOVENOX) subcutaneous injection 40 mg  40 mg Subcutaneous Daily Ziggy Major MD        insulin aspart protamine-insulin aspart (NovoLOG 70/30) 100 units/mL subcutaneous injection 15 Units  15 Units Subcutaneous BID AC Ziggy Major MD        insulin lispro (HumaLOG) 100 units/mL subcutaneous injection 2-12 Units  2-12 Units Subcutaneous TID AC Ziggy Major MD   10 Units at 03/05/21 2105    metoprolol succinate (TOPROL-XL) 24 hr tablet 12 5 mg  12 5 mg Oral Daily Ziggy Major MD        nicotine (NICODERM CQ) 7 mg/24hr TD 24 hr patch 1 patch  1 patch Transdermal Daily Ziggy Major MD        nitroglycerin (NITROSTAT) SL tablet 0 4 mg  0 4 mg Sublingual Q5 Min PRN Ziggy Major MD        ondansetron (ZOFRAN) injection 4 mg  4 mg Intravenous Q6H PRN Ziggy Major MD        ticagrelor (BRILINTA) tablet 90 mg  90 mg Oral Q12H ROEL Ziggy Major MD   90 mg at 03/05/21 2106      Medications Prior to Admission   Medication    alendronate (FOSAMAX) 70 mg tablet    aspirin 81 MG tablet    atorvastatin (LIPITOR) 40 mg tablet    cholecalciferol (VITAMIN D3) 1,000 units tablet    cyanocobalamin (VITAMIN B-12) 1,000 mcg tablet    ergocalciferol (VITAMIN D2) 50,000 units    glipiZIDE (GLIPIZIDE XL) 10 mg 24 hr tablet    insulin NPH-insulin regular (NOVOLIN 70/30 RELION) 100 units/mL subcutaneous injection    metoprolol succinate (TOPROL-XL) 25 mg 24 hr tablet    nicotine (NICODERM CQ) 7 mg/24hr TD 24 hr patch    nitroglycerin (NITROSTAT) 0 4 mg SL tablet    pioglitazone (ACTOS) 45 mg tablet    ticagrelor (BRILINTA) 90 MG       Labs:  Results from last 7 days   Lab Units 03/06/21  0554 03/05/21  1653 03/01/21  0518 02/28/21  0106   WBC Thousand/uL 6 18 6 38 7 02 7 15   HEMOGLOBIN g/dL 11 9* 13 1 12 6 13 7   HEMATOCRIT % 37 1 41 3 38 3 44 0   PLATELETS Thousands/uL 237 252 210 231   NEUTROS PCT % 68 70  --  75   LYMPHS PCT % 12* 10*  --  13*   MONOS PCT % 10 12  --  11   EOS PCT % 8* 6  --  1     Results from last 7 days   Lab Units 03/06/21  0554 03/05/21  1653 03/01/21  0518 02/28/21  0106   POTASSIUM mmol/L 4 0 4 4 4 3 4 5   CHLORIDE mmol/L 104 102 103 101   CO2 mmol/L 27 29 26 24   BUN mg/dL 22 28* 31* 25   CREATININE mg/dL 0 91 1 00 1 19 1 16   CALCIUM mg/dL 8 4 8 9 9 2 9 0   ALK PHOS U/L 108  --  99 120*   ALT U/L 70  --  39 61   AST U/L 45  --  45 83*     Lab Results   Component Value Date    TROPONINI 30 86 (H) 03/01/2021    TROPONINI 31 55 (H) 02/28/2021    TROPONINI 33 98 (H) 02/28/2021    CKMB 31 0 (H) 02/28/2021    CKTOTAL 426 (H) 02/28/2021     Results from last 7 days   Lab Units 03/06/21  0554 03/05/21  1653 02/28/21  0106   INR  1 24* 1 19 1 17     No results found for: [place]ice [place], SPUTUMCULTUR      Imaging:  Results for orders placed during the hospital encounter of 03/05/21   XR chest 1 view portable    Narrative CHEST     INDICATION:   weakness      COMPARISON:  None    EXAM PERFORMED/VIEWS:  XR CHEST PORTABLE      FINDINGS:  Multiple defibrillator overlies the chest and limits evaluation  Cardiomediastinal silhouette appears unremarkable  The lungs are clear  No pneumothorax or pleural effusion  Osseous structures appear within normal limits for patient age  Impression No focal consolidation  Workstation performed: [id number]       No results found for this or any previous visit  Last 24 Hours Medication List:   Current Facility-Administered Medications   Medication Dose Route Frequency Provider Last Rate    acetaminophen  650 mg Oral Q6H PRN Chuck Sexton MD      aspirin  81 mg Oral Daily Chuck Sexton MD      atorvastatin  40 mg Oral Daily Chuck Sexton MD      cefTRIAXone  1,000 mg Intravenous Q24H Chuck Sexton MD      cholecalciferol  1,000 Units Oral Daily Chuck Sexton MD      vitamin B-12  1,000 mcg Oral Daily Chuck Sexton MD      enoxaparin  40 mg Subcutaneous Daily Chuck Sexton MD      insulin aspart protamine-insulin aspart  15 Units Subcutaneous BID AC Chuck Sexton MD      insulin lispro  2-12 Units Subcutaneous TID AC Chuck Sexton MD      metoprolol succinate  12 5 mg Oral Daily Chuck Sexton MD      nicotine  1 patch Transdermal Daily Chuck Sexton MD      nitroglycerin  0 4 mg Sublingual Q5 Min PRN Chuck Sexton MD      ondansetron  4 mg Intravenous Q6H PRN Chuck Sexton MD      ticagrelor  90 mg Oral Q12H Анна Perez MD          Today, Patient Was Seen By: Chuck Sexton MD    ** Please Note: Dictation voice to text software may have been used in the creation of this document   **

## 2021-03-07 LAB
BACTERIA UR CULT: NORMAL
BASOPHILS # BLD AUTO: 0.05 THOUSANDS/ΜL (ref 0–0.1)
BASOPHILS NFR BLD AUTO: 1 % (ref 0–1)
EOSINOPHIL # BLD AUTO: 0.38 THOUSAND/ΜL (ref 0–0.61)
EOSINOPHIL NFR BLD AUTO: 6 % (ref 0–6)
ERYTHROCYTE [DISTWIDTH] IN BLOOD BY AUTOMATED COUNT: 13.3 % (ref 11.6–15.1)
GLUCOSE SERPL-MCNC: 173 MG/DL (ref 65–140)
GLUCOSE SERPL-MCNC: 189 MG/DL (ref 65–140)
GLUCOSE SERPL-MCNC: 203 MG/DL (ref 65–140)
GLUCOSE SERPL-MCNC: 208 MG/DL (ref 65–140)
HCT VFR BLD AUTO: 36.9 % (ref 36.5–49.3)
HGB BLD-MCNC: 11.7 G/DL (ref 12–17)
IMM GRANULOCYTES # BLD AUTO: 0.05 THOUSAND/UL (ref 0–0.2)
IMM GRANULOCYTES NFR BLD AUTO: 1 % (ref 0–2)
LYMPHOCYTES # BLD AUTO: 0.82 THOUSANDS/ΜL (ref 0.6–4.47)
LYMPHOCYTES NFR BLD AUTO: 13 % (ref 14–44)
MCH RBC QN AUTO: 31.6 PG (ref 26.8–34.3)
MCHC RBC AUTO-ENTMCNC: 31.7 G/DL (ref 31.4–37.4)
MCV RBC AUTO: 100 FL (ref 82–98)
MONOCYTES # BLD AUTO: 0.85 THOUSAND/ΜL (ref 0.17–1.22)
MONOCYTES NFR BLD AUTO: 13 % (ref 4–12)
NEUTROPHILS # BLD AUTO: 4.42 THOUSANDS/ΜL (ref 1.85–7.62)
NEUTS SEG NFR BLD AUTO: 66 % (ref 43–75)
NRBC BLD AUTO-RTO: 0 /100 WBCS
PLATELET # BLD AUTO: 236 THOUSANDS/UL (ref 149–390)
PMV BLD AUTO: 11.3 FL (ref 8.9–12.7)
RBC # BLD AUTO: 3.7 MILLION/UL (ref 3.88–5.62)
WBC # BLD AUTO: 6.57 THOUSAND/UL (ref 4.31–10.16)

## 2021-03-07 PROCEDURE — 82948 REAGENT STRIP/BLOOD GLUCOSE: CPT

## 2021-03-07 PROCEDURE — 99232 SBSQ HOSP IP/OBS MODERATE 35: CPT | Performed by: INTERNAL MEDICINE

## 2021-03-07 PROCEDURE — 85025 COMPLETE CBC W/AUTO DIFF WBC: CPT | Performed by: INTERNAL MEDICINE

## 2021-03-07 RX ADMIN — INSULIN LISPRO 2 UNITS: 100 INJECTION, SOLUTION INTRAVENOUS; SUBCUTANEOUS at 22:35

## 2021-03-07 RX ADMIN — CYANOCOBALAMIN TAB 500 MCG 1000 MCG: 500 TAB at 08:48

## 2021-03-07 RX ADMIN — ATORVASTATIN CALCIUM 40 MG: 40 TABLET, FILM COATED ORAL at 08:49

## 2021-03-07 RX ADMIN — ASPIRIN 81 MG: 81 TABLET, COATED ORAL at 08:48

## 2021-03-07 RX ADMIN — INSULIN ASPART 15 UNITS: 100 INJECTION, SUSPENSION SUBCUTANEOUS at 09:02

## 2021-03-07 RX ADMIN — TICAGRELOR 90 MG: 90 TABLET ORAL at 22:35

## 2021-03-07 RX ADMIN — INSULIN LISPRO 4 UNITS: 100 INJECTION, SOLUTION INTRAVENOUS; SUBCUTANEOUS at 12:23

## 2021-03-07 RX ADMIN — NICOTINE 7 MG/24 HR DAILY TRANSDERMAL PATCH 1 PATCH: at 08:54

## 2021-03-07 RX ADMIN — ENOXAPARIN SODIUM 40 MG: 40 INJECTION SUBCUTANEOUS at 08:49

## 2021-03-07 RX ADMIN — INSULIN LISPRO 4 UNITS: 100 INJECTION, SOLUTION INTRAVENOUS; SUBCUTANEOUS at 16:54

## 2021-03-07 RX ADMIN — INSULIN ASPART 15 UNITS: 100 INJECTION, SUSPENSION SUBCUTANEOUS at 16:54

## 2021-03-07 RX ADMIN — INSULIN LISPRO 2 UNITS: 100 INJECTION, SOLUTION INTRAVENOUS; SUBCUTANEOUS at 08:58

## 2021-03-07 RX ADMIN — Medication 1000 UNITS: at 08:49

## 2021-03-07 RX ADMIN — TICAGRELOR 90 MG: 90 TABLET ORAL at 08:48

## 2021-03-07 RX ADMIN — CEFTRIAXONE 1000 MG: 1 INJECTION, SOLUTION INTRAVENOUS at 08:57

## 2021-03-07 NOTE — ASSESSMENT & PLAN NOTE
Lab Results   Component Value Date    HGBA1C 7 5 (H) 02/28/2021       Recent Labs     03/06/21  0733 03/06/21  1150 03/06/21  1608 03/06/21  2109   POCGLU 195* 301* 241* 219*       Blood Sugar Average: Last 72 hrs:  (P) 267 8 Continue on NovoLog 70/30 15 units SC b i d   Accu-Chek a c  HS with Humalog sliding scale

## 2021-03-07 NOTE — ASSESSMENT & PLAN NOTE
Patient was recently discharged from [place] [address] on March 3rd with type 1 MI  Patient had troponin greater than 40 during that admission  Patient underwent cardiac catheterization which showed severe three-vessel CAD, s/p PCI of LAD  Cardiomyopathy with severely reduced LV systolic dysfunction ejection fraction 24% with grade 3 diastolic dysfunction  Patient was not considered a good candidate for CABG because of his comorbidities    Continue on medical management  Patient had LifeVest placed  On aspirin, Brilinta, Lipitor, Toprol-XL  Denies any chest pain or shortness of breath

## 2021-03-07 NOTE — PROGRESS NOTES
Progress Note - Dwight Harm 1940, 68 y o  male MRN: [id number]    Unit/Bed#: -01 Encounter: [id number]    Primary Care Provider: Miriam Delgado MD   Date and time admitted to hospital: 3/5/2021  4:23 PM        Ambulatory dysfunction  Assessment & Plan  Patient is brought in by the family because of ambulatory dysfunction and high risk for falls  Family discussed with  who recommended coming to ER for rehab placement  P T /OT consult  Case management consult for skilled nursing rehab placement  Fall precautions    * UTI (urinary tract infection)  Assessment & Plan  Positive UA  Continue on Rocephin  Follow-up urine culture  Trend WBC and fever curve    Non-STEMI (non-ST elevated myocardial infarction) [facility])  Assessment & Plan  Patient was recently discharged from [place] [address] on March 3rd with type 1 MI  Patient had troponin greater than 40 during that admission  Patient underwent cardiac catheterization which showed severe three-vessel CAD, s/p PCI of LAD  Cardiomyopathy with severely reduced LV systolic dysfunction ejection fraction 24% with grade 3 diastolic dysfunction  Patient was not considered a good candidate for CABG because of his comorbidities    Continue on medical management  Patient had LifeVest placed  On aspirin, Brilinta, Lipitor, Toprol-XL  Denies any chest pain or shortness of breath    Hyperglycemia due to type 2 diabetes mellitus [facility])  Assessment & Plan  Lab Results   Component Value Date    HGBA1C 7 5 (H) 02/28/2021       Recent Labs     03/06/21  0733 03/06/21  1150 03/06/21  1608 03/06/21  2109   POCGLU 195* 301* 241* 219*       Blood Sugar Average: Last 72 hrs:  (P) 267 8 Continue on NovoLog 70/30 15 units SC b i d   Accu-Chek a c  HS with Humalog sliding scale    Smokes a pipe  Assessment & Plan  Smoking cessation counseling given  On nicotine patch    Hypertension  Assessment & Plan  Continue on Toprol XL  Monitor vitals as per protocol    Hyperlipidemia  Assessment & Plan  On statin      Labs & Imaging: I have personally reviewed pertinent reports  VTE Pharmacologic Prophylaxis: Enoxaparin (Lovenox)  VTE Mechanical Prophylaxis: sequential compression device    Code Status:   Level 1 - Full Code    Patient Centered Rounds: I have performed bedside rounds with nursing staff today  Discussions with Specialists or Other Care Team Provider:     Education and Discussions with Family / Patient:  Laviniaarvin Frankju    Current Length of Stay: 2 day(s)    Current Patient Status: Inpatient   Certification Statement: The patient will continue to require additional inpatient hospital stay due to see my assessment and plan  Subjective:   Patient is seen and examined at bedside  No new complaints  Afebrile  All other ROS are negative  Objective:    Vitals: Blood pressure (!) 104/47, pulse 73, temperature 98 6 °F (37 °C), resp  rate 18, height 5' 10" (1 778 m), weight 83 kg (182 lb 15 7 oz), SpO2 92 %  ,Body mass index is 26 26 kg/m²  SPO2 RA Rest      ED to Hosp-Admission (Current) from 3/5/2021 in [address] Med Surg Unit   SpO2  92 %   SpO2 Activity  At Rest   O2 Device  None (Room air)   O2 Flow Rate  --        I&O:     Intake/Output Summary (Last 24 hours) at 3/7/2021 0648  Last data filed at 3/7/2021 0401  Gross per 24 hour   Intake 660 ml   Output 950 ml   Net -290 ml       Physical Exam:    General- Alert, lying comfortably in bed  Not in any acute distress  Neck- Supple, No JVD  CVS- regular, S1 and S2 normal   Life Vest in place  Chest- Bilateral Air entry, No rhochi, crackles or wheezing present  Abdomen- soft, nontender, not distended, no guarding or rigidity, BS+  Extremities-  No pedal edema, No calf tenderness  Moving all 4 extremities  CNS-   Alert, awake and orientedx3  No focal deficits present      Invasive Devices     Peripheral Intravenous Line            Peripheral IV 03/05/21 Left Forearm 1 day Social History  reviewed  Family History   Problem Relation Age of Onset    No Known Problems Mother     Arthritis Father     Diabetes unspecified Brother     reviewed    Meds:  Current Facility-Administered Medications   Medication Dose Route Frequency Provider Last Rate Last Admin    acetaminophen (TYLENOL) tablet 650 mg  650 mg Oral Q6H PRN Frank Villafuerte MD        aspirin (ECOTRIN LOW STRENGTH) EC tablet 81 mg  81 mg Oral Daily Frank Villafuerte MD   81 mg at 03/06/21 0813    atorvastatin (LIPITOR) tablet 40 mg  40 mg Oral Daily Frank Villafuerte MD   40 mg at 03/06/21 0814    cefTRIAXone (ROCEPHIN) IVPB (premix in dextrose) 1,000 mg 50 mL  1,000 mg Intravenous Q24H Frank Villafuerte  mL/hr at 03/06/21 0820 1,000 mg at 03/06/21 0820    cholecalciferol (VITAMIN D3) tablet 1,000 Units  1,000 Units Oral Daily Frank Villafuerte MD   1,000 Units at 03/06/21 0815    cyanocobalamin (VITAMIN B-12) tablet 1,000 mcg  1,000 mcg Oral Daily Frank Villafuerte MD   1,000 mcg at 03/06/21 0814    enoxaparin (LOVENOX) subcutaneous injection 40 mg  40 mg Subcutaneous Daily Frank Villafuerte MD   40 mg at 03/06/21 0496    insulin aspart protamine-insulin aspart (NovoLOG 70/30) 100 units/mL subcutaneous injection 15 Units  15 Units Subcutaneous BID AC Frank Villafuerte MD   15 Units at 03/06/21 1626    insulin lispro (HumaLOG) 100 units/mL subcutaneous injection 2-12 Units  2-12 Units Subcutaneous HS Nora Corea PA-C   4 Units at 03/06/21 2142    insulin lispro (HumaLOG) 100 units/mL subcutaneous injection 2-12 Units  2-12 Units Subcutaneous TID AC Essie Santos PA-C        metoprolol succinate (TOPROL-XL) 24 hr tablet 12 5 mg  12 5 mg Oral Daily Frank Villafuerte MD        nicotine (NICODERM CQ) 7 mg/24hr TD 24 hr patch 1 patch  1 patch Transdermal Daily Frank Villafuerte MD   1 patch at 03/06/21 0815    nitroglycerin (NITROSTAT) SL tablet 0 4 mg  0 4 mg Sublingual Q5 Min PRN Davidkodi Hathaway MD        ondansetron (ZOFRAN) injection 4 mg  4 mg Intravenous Q6H PRN Chuck Sexton MD        ticagrelor [facility]) tablet 90 mg  90 mg Oral Q12H [address] Chuck Sexton MD   90 mg at 03/06/21 2142      Medications Prior to Admission   Medication    alendronate (FOSAMAX) 70 mg tablet    aspirin 81 MG tablet    atorvastatin (LIPITOR) 40 mg tablet    cholecalciferol (VITAMIN D3) 1,000 units tablet    cyanocobalamin (VITAMIN B-12) 1,000 mcg tablet    ergocalciferol (VITAMIN D2) 50,000 units    glipiZIDE (GLIPIZIDE XL) 10 mg 24 hr tablet    insulin NPH-insulin regular (NOVOLIN 70/30 RELION) 100 units/mL subcutaneous injection    metoprolol succinate (TOPROL-XL) 25 mg 24 hr tablet    nicotine (NICODERM CQ) 7 mg/24hr TD 24 hr patch    nitroglycerin (NITROSTAT) 0 4 mg SL tablet    pioglitazone (ACTOS) 45 mg tablet    ticagrelor (BRILINTA) 90 MG       Labs:  Results from last 7 days   Lab Units 03/07/21  0447 03/06/21  0554 03/05/21  1653   WBC Thousand/uL 6 57 6 18 6 38   HEMOGLOBIN g/dL 11 7* 11 9* 13 1   HEMATOCRIT % 36 9 37 1 41 3   PLATELETS Thousands/uL 236 237 252   NEUTROS PCT % 66 68 70   LYMPHS PCT % 13* 12* 10*   MONOS PCT % 13* 10 12   EOS PCT % 6 8* 6     Results from last 7 days   Lab Units 03/06/21  0554 03/05/21  1653 03/01/21  0518   POTASSIUM mmol/L 4 0 4 4 4 3   CHLORIDE mmol/L 104 102 103   CO2 mmol/L 27 29 26   BUN mg/dL 22 28* 31*   CREATININE mg/dL 0 91 1 00 1 19   CALCIUM mg/dL 8 4 8 9 9 2   ALK PHOS U/L 108  --  99   ALT U/L 70  --  39   AST U/L 45  --  45     Lab Results   Component Value Date    TROPONINI 30 86 (H) 03/01/2021    TROPONINI 31 55 (H) 02/28/2021    TROPONINI 33 98 (H) 02/28/2021    CKMB 31 0 (H) 02/28/2021    CKTOTAL 426 (H) 02/28/2021     Results from last 7 days   Lab Units 03/06/21  0554 03/05/21  1653   INR  1 24* 1 19     No results found for: Casandra Panning, SPUTUMCULTUR      Imaging:  Results for orders placed during the hospital encounter of 03/05/21   XR chest 1 view portable    Narrative CHEST     INDICATION:   weakness  COMPARISON:  None    EXAM PERFORMED/VIEWS:  XR CHEST PORTABLE      FINDINGS:  Multiple defibrillator overlies the chest and limits evaluation  Cardiomediastinal silhouette appears unremarkable  The lungs are clear  No pneumothorax or pleural effusion  Osseous structures appear within normal limits for patient age  Impression No focal consolidation  Workstation performed: [id number]       No results found for this or any previous visit  Last 24 Hours Medication List:   Current Facility-Administered Medications   Medication Dose Route Frequency Provider Last Rate    acetaminophen  650 mg Oral Q6H PRN Xiomara Huerta MD      aspirin  81 mg Oral Daily Xiomara Huerta MD      atorvastatin  40 mg Oral Daily Xiomara Huerta MD      cefTRIAXone  1,000 mg Intravenous Q24H Xiomara Huerta MD 1,000 mg (03/06/21 0820)    cholecalciferol  1,000 Units Oral Daily Xiomara Huerta MD      vitamin B-12  1,000 mcg Oral Daily Xiomara Huerta MD      enoxaparin  40 mg Subcutaneous Daily Xiomara Huerta MD      insulin aspart protamine-insulin aspart  15 Units Subcutaneous BID AC Xiomara Huerta MD      insulin lispro  2-12 Units Subcutaneous HS Josh Kruse PA-C      insulin lispro  2-12 Units Subcutaneous TID AC Essie Santos PA-C      metoprolol succinate  12 5 mg Oral Daily Xiomara Huerta MD      nicotine  1 patch Transdermal Daily Xiomara Huerta MD      nitroglycerin  0 4 mg Sublingual Q5 Min PRN Xiomara Huerta MD      ondansetron  4 mg Intravenous Q6H PRN Xiomara Huerta MD      ticagrelor  90 mg Oral Q12H Minh Rawls MD          Today, Patient Was Seen By: Xiomara Huerta MD    ** Please Note: Dictation voice to text software may have been used in the creation of this document   **

## 2021-03-07 NOTE — PLAN OF CARE
Problem: Potential for Falls  Goal: Patient will remain free of falls  Description: INTERVENTIONS:  - Assess patient frequently for physical needs  -  Identify cognitive and physical deficits and behaviors that affect risk of falls    -  [place] fall precautions as indicated by assessment   - Educate patient/family on patient safety including physical limitations  - Instruct patient to call for assistance with activity based on assessment  - Modify environment to reduce risk of injury  - Consider OT/PT consult to assist with strengthening/mobility  Outcome: Progressing     Problem: Prexisting or High Potential for Compromised Skin Integrity  Goal: Skin integrity is maintained or improved  Description: INTERVENTIONS:  - Identify patients at risk for skin breakdown  - Assess and monitor skin integrity  - Assess and monitor nutrition and hydration status  - Monitor labs   - Assess for incontinence   - Turn and reposition patient  - Assist with mobility/ambulation  - Relieve pressure over bony prominences  - Avoid friction and shearing  - Provide appropriate hygiene as needed including keeping skin clean and dry  - Evaluate need for skin moisturizer/barrier cream  - Collaborate with interdisciplinary team   - Patient/family teaching  - Consider wound care consult   Outcome: Progressing     Problem: CARDIOVASCULAR - ADULT  Goal: Maintains optimal cardiac output and hemodynamic stability  Description: INTERVENTIONS:  - Monitor I/O, vital signs and rhythm  - Monitor for S/S and trends of decreased cardiac output  - Administer and titrate ordered vasoactive medications to optimize hemodynamic stability  - Assess quality of pulses, skin color and temperature  - Assess for signs of decreased coronary artery perfusion  - Instruct patient to report change in severity of symptoms  Outcome: Progressing     Problem: RESPIRATORY - ADULT  Goal: Achieves optimal ventilation and oxygenation  Description: INTERVENTIONS:  - Assess for changes in respiratory status  - Assess for changes in mentation and behavior  - Position to facilitate oxygenation and minimize respiratory effort  - Oxygen administered by appropriate delivery if ordered  - Initiate smoking cessation education as indicated  - Encourage broncho-pulmonary hygiene including cough, deep breathe, Incentive Spirometry  - Assess the need for suctioning and aspirate as needed  - Assess and instruct to report SOB or any respiratory difficulty  - Respiratory Therapy support as indicated  Outcome: Progressing     Problem: MUSCULOSKELETAL - ADULT  Goal: Maintain or return mobility to safest level of function  Description: INTERVENTIONS:  - Assess patient's ability to carry out ADLs; assess patient's baseline for ADL function and identify physical deficits which impact ability to perform ADLs (bathing, care of mouth/teeth, toileting, grooming, dressing, etc )  - Assess/evaluate cause of self-care deficits   - Assess range of motion  - Assess patient's mobility  - Assess patient's need for assistive devices and provide as appropriate  - Encourage maximum independence but intervene and supervise when necessary  - Involve family in performance of ADLs  - Assess for home care needs following discharge   - Consider OT consult to assist with ADL evaluation and planning for discharge  - Provide patient education as appropriate  Outcome: Progressing     Problem: PAIN - ADULT  Goal: Verbalizes/displays adequate comfort level or baseline comfort level  Description: Interventions:  - Encourage patient to monitor pain and request assistance  - Assess pain using appropriate pain scale  - Administer analgesics based on type and severity of pain and evaluate response  - Implement non-pharmacological measures as appropriate and evaluate response  - Consider cultural and social influences on pain and pain management  - Notify physician/advanced practitioner if interventions unsuccessful or patient reports new pain  Outcome: Progressing     Problem: SAFETY ADULT  Goal: Patient will remain free of falls  Description: INTERVENTIONS:  - Assess patient frequently for physical needs  -  Identify cognitive and physical deficits and behaviors that affect risk of falls    -  [place] fall precautions as indicated by assessment   - Educate patient/family on patient safety including physical limitations  - Instruct patient to call for assistance with activity based on assessment  - Modify environment to reduce risk of injury  - Consider OT/PT consult to assist with strengthening/mobility  Outcome: Progressing  Goal: Maintain or return to baseline ADL function  Description: INTERVENTIONS:  -  Assess patient's ability to carry out ADLs; assess patient's baseline for ADL function and identify physical deficits which impact ability to perform ADLs (bathing, care of mouth/teeth, toileting, grooming, dressing, etc )  - Assess/evaluate cause of self-care deficits   - Assess range of motion  - Assess patient's mobility; develop plan if impaired  - Assess patient's need for assistive devices and provide as appropriate  - Encourage maximum independence but intervene and supervise when necessary  - Involve family in performance of ADLs  - Assess for home care needs following discharge   - Consider OT consult to assist with ADL evaluation and planning for discharge  - Provide patient education as appropriate  Outcome: Progressing  Goal: Maintain or return mobility status to optimal level  Description: INTERVENTIONS:  - Assess patient's baseline mobility status (ambulation, transfers, stairs, etc )    - Identify cognitive and physical deficits and behaviors that affect mobility  - Identify mobility aids required to assist with transfers and/or ambulation (gait belt, sit-to-stand, lift, walker, cane, etc )  - [place] fall precautions as indicated by assessment  - Record patient progress and toleration of activity level on Mobility SBAR; progress patient to next Phase/Stage  - Instruct patient to call for assistance with activity based on assessment  - Consider rehabilitation consult to assist with strengthening/weightbearing, etc   Outcome: Progressing     Problem: DISCHARGE PLANNING  Goal: Discharge to home or other facility with appropriate resources  Description: INTERVENTIONS:  - Identify barriers to discharge w/patient and caregiver  - Arrange for needed discharge resources and transportation as appropriate  - Identify discharge learning needs (meds, wound care, etc )  - Arrange for interpretive services to assist at discharge as needed  - Refer to Case Management Department for coordinating discharge planning if the patient needs post-hospital services based on physician/advanced practitioner order or complex needs related to functional status, cognitive ability, or social support system  Outcome: Progressing     Problem: Knowledge Deficit  Goal: Patient/family/caregiver demonstrates understanding of disease process, treatment plan, medications, and discharge instructions  Description: Complete learning assessment and assess knowledge base    Interventions:  - Provide teaching at level of understanding  - Provide teaching via preferred learning methods  Outcome: Progressing

## 2021-03-08 LAB
ANION GAP SERPL CALCULATED.3IONS-SCNC: 10 MMOL/L (ref 4–13)
BASOPHILS # BLD AUTO: 0.06 THOUSANDS/ΜL (ref 0–0.1)
BASOPHILS NFR BLD AUTO: 1 % (ref 0–1)
BUN SERPL-MCNC: 20 MG/DL (ref 5–25)
CALCIUM SERPL-MCNC: 8.5 MG/DL (ref 8.3–10.1)
CHLORIDE SERPL-SCNC: 103 MMOL/L (ref 100–108)
CO2 SERPL-SCNC: 24 MMOL/L (ref 21–32)
CREAT SERPL-MCNC: 0.88 MG/DL (ref 0.6–1.3)
EOSINOPHIL # BLD AUTO: 0.47 THOUSAND/ΜL (ref 0–0.61)
EOSINOPHIL NFR BLD AUTO: 7 % (ref 0–6)
ERYTHROCYTE [DISTWIDTH] IN BLOOD BY AUTOMATED COUNT: 13.2 % (ref 11.6–15.1)
GFR SERPL CREATININE-BSD FRML MDRD: 82 ML/MIN/1.73SQ M
GLUCOSE SERPL-MCNC: 130 MG/DL (ref 65–140)
GLUCOSE SERPL-MCNC: 142 MG/DL (ref 65–140)
GLUCOSE SERPL-MCNC: 157 MG/DL (ref 65–140)
GLUCOSE SERPL-MCNC: 192 MG/DL (ref 65–140)
GLUCOSE SERPL-MCNC: 227 MG/DL (ref 65–140)
GLUCOSE SERPL-MCNC: 232 MG/DL (ref 65–140)
HCT VFR BLD AUTO: 35.6 % (ref 36.5–49.3)
HGB BLD-MCNC: 11.7 G/DL (ref 12–17)
IMM GRANULOCYTES # BLD AUTO: 0.05 THOUSAND/UL (ref 0–0.2)
IMM GRANULOCYTES NFR BLD AUTO: 1 % (ref 0–2)
LYMPHOCYTES # BLD AUTO: 0.86 THOUSANDS/ΜL (ref 0.6–4.47)
LYMPHOCYTES NFR BLD AUTO: 12 % (ref 14–44)
MAGNESIUM SERPL-MCNC: 1.9 MG/DL (ref 1.6–2.6)
MCH RBC QN AUTO: 32 PG (ref 26.8–34.3)
MCHC RBC AUTO-ENTMCNC: 32.9 G/DL (ref 31.4–37.4)
MCV RBC AUTO: 97 FL (ref 82–98)
MONOCYTES # BLD AUTO: 0.85 THOUSAND/ΜL (ref 0.17–1.22)
MONOCYTES NFR BLD AUTO: 12 % (ref 4–12)
NEUTROPHILS # BLD AUTO: 4.9 THOUSANDS/ΜL (ref 1.85–7.62)
NEUTS SEG NFR BLD AUTO: 67 % (ref 43–75)
NRBC BLD AUTO-RTO: 0 /100 WBCS
PHOSPHATE SERPL-MCNC: 3.4 MG/DL (ref 2.3–4.1)
PLATELET # BLD AUTO: 235 THOUSANDS/UL (ref 149–390)
PMV BLD AUTO: 11 FL (ref 8.9–12.7)
POTASSIUM SERPL-SCNC: 4.2 MMOL/L (ref 3.5–5.3)
RBC # BLD AUTO: 3.66 MILLION/UL (ref 3.88–5.62)
SODIUM SERPL-SCNC: 137 MMOL/L (ref 136–145)
WBC # BLD AUTO: 7.19 THOUSAND/UL (ref 4.31–10.16)

## 2021-03-08 PROCEDURE — 97167 OT EVAL HIGH COMPLEX 60 MIN: CPT

## 2021-03-08 PROCEDURE — 82948 REAGENT STRIP/BLOOD GLUCOSE: CPT

## 2021-03-08 PROCEDURE — 97163 PT EVAL HIGH COMPLEX 45 MIN: CPT

## 2021-03-08 PROCEDURE — 84100 ASSAY OF PHOSPHORUS: CPT | Performed by: INTERNAL MEDICINE

## 2021-03-08 PROCEDURE — 85025 COMPLETE CBC W/AUTO DIFF WBC: CPT | Performed by: INTERNAL MEDICINE

## 2021-03-08 PROCEDURE — 83735 ASSAY OF MAGNESIUM: CPT | Performed by: INTERNAL MEDICINE

## 2021-03-08 PROCEDURE — 80048 BASIC METABOLIC PNL TOTAL CA: CPT | Performed by: INTERNAL MEDICINE

## 2021-03-08 PROCEDURE — 99232 SBSQ HOSP IP/OBS MODERATE 35: CPT | Performed by: INTERNAL MEDICINE

## 2021-03-08 RX ADMIN — METOPROLOL SUCCINATE 12.5 MG: 25 TABLET, FILM COATED, EXTENDED RELEASE ORAL at 08:01

## 2021-03-08 RX ADMIN — TICAGRELOR 90 MG: 90 TABLET ORAL at 08:02

## 2021-03-08 RX ADMIN — ASPIRIN 81 MG: 81 TABLET, COATED ORAL at 08:00

## 2021-03-08 RX ADMIN — INSULIN ASPART 15 UNITS: 100 INJECTION, SUSPENSION SUBCUTANEOUS at 08:03

## 2021-03-08 RX ADMIN — CEFTRIAXONE 1000 MG: 1 INJECTION, SOLUTION INTRAVENOUS at 08:05

## 2021-03-08 RX ADMIN — CYANOCOBALAMIN TAB 500 MCG 1000 MCG: 500 TAB at 08:02

## 2021-03-08 RX ADMIN — INSULIN LISPRO 2 UNITS: 100 INJECTION, SOLUTION INTRAVENOUS; SUBCUTANEOUS at 12:13

## 2021-03-08 RX ADMIN — ENOXAPARIN SODIUM 40 MG: 40 INJECTION SUBCUTANEOUS at 08:03

## 2021-03-08 RX ADMIN — INSULIN LISPRO 5 UNITS: 100 INJECTION, SOLUTION INTRAVENOUS; SUBCUTANEOUS at 14:06

## 2021-03-08 RX ADMIN — Medication 1000 UNITS: at 08:01

## 2021-03-08 RX ADMIN — INSULIN ASPART 15 UNITS: 100 INJECTION, SUSPENSION SUBCUTANEOUS at 17:16

## 2021-03-08 RX ADMIN — INSULIN LISPRO 5 UNITS: 100 INJECTION, SOLUTION INTRAVENOUS; SUBCUTANEOUS at 17:16

## 2021-03-08 RX ADMIN — NICOTINE 7 MG/24 HR DAILY TRANSDERMAL PATCH 1 PATCH: at 08:04

## 2021-03-08 RX ADMIN — INSULIN LISPRO 4 UNITS: 100 INJECTION, SOLUTION INTRAVENOUS; SUBCUTANEOUS at 21:49

## 2021-03-08 RX ADMIN — INSULIN LISPRO 2 UNITS: 100 INJECTION, SOLUTION INTRAVENOUS; SUBCUTANEOUS at 17:16

## 2021-03-08 RX ADMIN — ATORVASTATIN CALCIUM 40 MG: 40 TABLET, FILM COATED ORAL at 08:01

## 2021-03-08 RX ADMIN — TICAGRELOR 90 MG: 90 TABLET ORAL at 20:32

## 2021-03-08 NOTE — PLAN OF CARE
Problem: OCCUPATIONAL THERAPY ADULT  Goal: Performs self-care activities at highest level of function for planned discharge setting  See evaluation for individualized goals  Outcome: Progressing  Note: Limitation: Decreased ADL status, Decreased Safe judgement during ADL, Decreased endurance, Decreased self-care trans, Decreased high-level ADLs  Prognosis: Good  Assessment: Pt is a 68 y o  male seen for OT evaluation at [address] [place] [address], admitted 3/5/2021 w/ UTI (urinary tract infection)  OT completed extensive review of pt's medical and social history  Comorbidities affecting pt's functional performance at time of assessment include: hyperglycemia, HTN, Non-STEMI, ambulatory dysfunction, UTI, osteoporosis  Personal factors affecting pt at time of IE include:steps to enter environment, limited home support, behavioral pattern, difficulty performing ADLS, difficulty performing IADLS , limited insight into deficits, decreased initiation and engagement  and health management   Prior to admission, pt was living alone in [facility], 1st fl setup, 3STE, full flight to basement laundry and was independent with ADL/IADL, driving  Pt had recent admission to [facility] and has since been staying with his brother, where he was struggling to perform self care and ambulation  Upon evaluation, pt presents to OT below baseline due to the following performance deficits: weakness, decreased strength, decreased balance, decreased tolerance, impaired problem solving and decreased safety awareness  Pt to benefit from continued skilled OT tx while in the hospital to address deficits as defined above and maximize level of functional independence w ADL's and functional mobility  Occupational Performance areas to address include: bathing/shower, toilet hygiene, dressing, functional mobility and functional transfers, bed mobility   The patient's raw score on the AM-PAC Daily Activity inpatient short form is 18, standardized score is 38 66, less than 39 4  Patients at this level are likely to benefit from DC to post-acute rehabilitation services  Based on findings, pt is of high complexity  At this time, OT recommendations at time of discharge are short term rehab       OT Discharge Recommendation: Post-Acute Rehabilitation Services  OT - OK to Discharge: Yes(to STR when medically stable)

## 2021-03-08 NOTE — PROGRESS NOTES
Progress Note - Alen Blackmon 1940, 68 y o  male MRN: [id number]    Unit/Bed#: -01 Encounter: [id number]    Primary Care Provider: Charo Lanza MD   Date and time admitted to hospital: 3/5/2021  4:23 PM        * Ambulatory dysfunction  Assessment & Plan  Patient with ambulatory dysfunction and at high risk for falls  Family discussed with  who recommended coming to ER for rehab placement  P T /OT consulted - they recommend rehab  Case management on board, looking for placement  Medically cleared for discharge    UTI (urinary tract infection)  Assessment & Plan  Urinalysis-  trace leucocytes, 10-20wbc and moderate bacteriuria  Urine cultures - No growth - UTI ruled out  DC IV Rocephin  Blood cultures negative till date    Smokes a pipe  Assessment & Plan  Smoking cessation counseling given  On nicotine patch    Non-STEMI (non-ST elevated myocardial infarction) [facility])  Assessment & Plan  Patient was recently discharged from [place] [id number] on March 3rd with type 1 MI  Patient had troponin greater than 40 during that admission  Patient underwent cardiac catheterization which showed severe three-vessel CAD, s/p PCI of LAD  Cardiomyopathy with severely reduced LV systolic dysfunction ejection fraction 24% with grade 3 diastolic dysfunction  Patient was not considered a good candidate for CABG because of his comorbidities    Continue on medical management  Patient had LifeVest placed  On aspirin, Brilinta, Lipitor, Toprol-XL  Denies any chest pain or shortness of breath    Hypertension  Assessment & Plan  Continue on Toprol XL  Monitor vitals as per protocol    Hyperlipidemia  Assessment & Plan  On statin    Hyperglycemia due to type 2 diabetes mellitus [facility])  Assessment & Plan  Lab Results   Component Value Date    HGBA1C 7 5 (H) 02/28/2021       Recent Labs     03/07/21  1606 03/07/21  2121 03/08/21  0746 03/08/21  1113   POCGLU 203* 189* 142* 192*       Blood Sugar Average: Last 72 hrs:  (P) 280 [id number] Continue on NovoLog 70/30 15 units SC b i d   Accu-Chek a c  HS with Humalog sliding scale      VTE Pharmacologic Prophylaxis:   Pharmacologic: Enoxaparin (Lovenox)  Mechanical VTE Prophylaxis in Place: Yes    Patient Centered Rounds: I have performed bedside rounds with nursing staff today  Discussions with Specialists or Other Care Team Provider:     Education and Discussions with Family / Patient: natyerAria    Time Spent for Care: 30 minutes  More than 50% of total time spent on counseling and coordination of care as described above  Current Length of Stay: 3 day(s)    Current Patient Status: Inpatient   Certification Statement: The patient will continue to require additional inpatient hospital stay due to as above    Discharge Plan: pending rehab placment    Code Status: Level 1 - Full Code      Subjective:   No overnight events, no complaints this am    Objective:     Vitals:   Temp (24hrs), Av 1 °F (36 7 °C), Min:97 3 °F (36 3 °C), Max:98 7 °F (37 1 °C)    Temp:  [97 3 °F (36 3 °C)-98 7 °F (37 1 °C)] 98 7 °F (37 1 °C)  HR:  [70-75] 72  BP: (124-126)/(60-62) 124/62  SpO2:  [93 %-98 %] 93 %  Body mass index is 26 19 kg/m²  Input and Output Summary (last 24 hours): Intake/Output Summary (Last 24 hours) at 3/8/2021 1334  Last data filed at 3/8/2021 1101  Gross per 24 hour   Intake 460 ml   Output 1750 ml   Net -1290 ml       Physical Exam:     Physical Exam  Vitals signs and nursing note reviewed  Constitutional:       General: He is not in acute distress  Appearance: Normal appearance  He is not toxic-appearing  Cardiovascular:      Rate and Rhythm: Normal rate and regular rhythm  Pulses: Normal pulses  Heart sounds: Murmur present  Pulmonary:      Effort: Pulmonary effort is normal  No respiratory distress  Breath sounds: Normal breath sounds  No wheezing or rales     Abdominal:      General: Bowel sounds are normal  There is no distension  Palpations: Abdomen is soft  Tenderness: There is no abdominal tenderness  There is no right CVA tenderness, left CVA tenderness or guarding  Musculoskeletal: Normal range of motion  General: No swelling or deformity  Right lower leg: No edema  Left lower leg: No edema  Skin:     General: Skin is warm and dry  Capillary Refill: Capillary refill takes less than 2 seconds  Coloration: Skin is not jaundiced  Findings: No bruising, erythema, lesion or rash  Neurological:      General: No focal deficit present  Mental Status: He is alert  Mental status is at baseline  Cranial Nerves: No cranial nerve deficit  Psychiatric:         Mood and Affect: Mood normal          Thought Content: Thought content normal          Additional Data:     Labs:    Results from last 7 days   Lab Units 03/08/21  0504   WBC Thousand/uL 7 19   HEMOGLOBIN g/dL 11 7*   HEMATOCRIT % 35 6*   PLATELETS Thousands/uL 235   NEUTROS PCT % 67   LYMPHS PCT % 12*   MONOS PCT % 12   EOS PCT % 7*     Results from last 7 days   Lab Units 03/08/21  0504 03/06/21  0554   SODIUM mmol/L 137 138   POTASSIUM mmol/L 4 2 4 0   CHLORIDE mmol/L 103 104   CO2 mmol/L 24 27   BUN mg/dL 20 22   CREATININE mg/dL 0 88 0 91   ANION GAP mmol/L 10 7   CALCIUM mg/dL 8 5 8 4   ALBUMIN g/dL  --  2 4*   TOTAL BILIRUBIN mg/dL  --  0 40   ALK PHOS U/L  --  108   ALT U/L  --  70   AST U/L  --  45   GLUCOSE RANDOM mg/dL 130 172*     Results from last 7 days   Lab Units 03/06/21  0554   INR  1 24*     Results from last 7 days   Lab Units 03/08/21  1113 03/08/21  0746 03/07/21  2121 03/07/21  1606 03/07/21  1128 03/07/21  0720 03/06/21  2109 03/06/21  1608 03/06/21  1150 03/06/21  0733 03/05/21  2101 03/03/21  1146   POC GLUCOSE mg/dl 192* 142* 189* 203* 208* 173* 219* 241* 301* 195* 383* 278*  278*                   * I Have Reviewed All Lab Data Listed Above    * Additional Pertinent Lab Tests Reviewed: All Labs Within Last 24 Hours Reviewed    Imaging:    Imaging Reports Reviewed Today Include:   Imaging Personally Reviewed by Myself Includes:      Recent Cultures (last 7 days):     Results from last 7 days   Lab Units 03/05/21 2046   URINE CULTURE  No Growth <1000 cfu/mL       Last 24 Hours Medication List:   Current Facility-Administered Medications   Medication Dose Route Frequency Provider Last Rate    acetaminophen  650 mg Oral Q6H PRN Erven Mail, MD      aspirin  81 mg Oral Daily Erven Mail, MD      atorvastatin  40 mg Oral Daily Erven Mail, MD      cholecalciferol  1,000 Units Oral Daily Erven Mail, MD      vitamin B-12  1,000 mcg Oral Daily Erven Mail, MD      enoxaparin  40 mg Subcutaneous Daily Erven Mail, MD      insulin aspart protamine-insulin aspart  15 Units Subcutaneous BID AC Erven Alfredo, MD      insulin lispro  2-12 Units Subcutaneous HS Essie Santos PA-C      insulin lispro  2-12 Units Subcutaneous TID AC Essie Santos PA-C      insulin lispro  5 Units Subcutaneous TID With Meals Alissa Agee MD      metoprolol succinate  12 5 mg Oral Daily Erven Mail, MD      nicotine  1 patch Transdermal Daily Erven Mail, MD      nitroglycerin  0 4 mg Sublingual Q5 Min PRN Erven Mail, MD      ondansetron  4 mg Intravenous Q6H PRN Erven Mail, MD      ticagrelor  90 mg Oral Q12H Abe Combs MD          Today, Patient Was Seen By: Alissa Agee MD    ** Please Note: Dictation voice to text software may have been used in the creation of this document   **

## 2021-03-08 NOTE — OCCUPATIONAL THERAPY NOTE
Occupational Therapy Evaluation      Alen Blackmon    3/8/2021    Principal Problem:    UTI (urinary tract infection)  Active Problems:    Hyperglycemia due to type 2 diabetes mellitus (HCC)    Hyperlipidemia    Hypertension    Non-STEMI (non-ST elevated myocardial infarction) ([facility] [address] )    Smokes a pipe    Ambulatory dysfunction      Past Medical History:   Diagnosis Date    Arthritis     Diabetes mellitus ([facility] [address] )     Hyperlipidemia     NSTEMI (non-ST elevated myocardial infarction) ([facility] )     Osteoporosis        Past Surgical History:   Procedure Laterality Date    TOTAL HIP ARTHROPLASTY          03/08/21 0858   OT Last Visit   OT Visit Date 03/08/21   Note Type   Note type Evaluation   Restrictions/Precautions   Weight Bearing Precautions Per Order No   Other Precautions Fall Risk;Multiple lines   Pain Assessment   Pain Assessment Tool 0-10   Pain Score No Pain   Home Living   Type of [address] [place] [address] Two level; Able to live on main level with bedroom/bathroom; Laundry in basement  (3STE, full flight to laundry)   Bathroom Shower/Tub Tub/shower unit   [employer] bars in shower; [address]   Additional Comments Was staying at [facility] D/C from [facility] with 1STE   Prior Function   Level of [place] Independent with ADLs and functional mobility   Lives With Alone   ADL Assistance Independent   IADLs Independent   Comments Pt was independent prior to admission to [facility], but has had decline in function since and was struggling to care for self at [facility]     Psychosocial   Psychosocial (WDL) WDL   ADL   Eating Assistance 7  Independent   Grooming Assistance 5  Supervision/Setup   UB Bathing Assistance 5  Supervision/Setup   LB Bathing Assistance 3  Moderate Assistance   UB Dressing Assistance 4  Minimal Assistance   LB Dressing Assistance 3  Moderate [address]  4  Minimal Assistance   Bed Mobility   Additional Comments Received OOB in recliner chair   Transfers   Sit to Stand 4  Minimal assistance   Additional items Assist x 2   Stand to Sit 4  Minimal assistance   Additional items Assist x 2   Stand pivot 4  Minimal assistance   Additional items Assist x 1  (RW, assist of a 2nd to manage IV pole)   Functional Mobility   Functional Mobility 4  Minimal assistance   Additional Comments x1, assist of a 2nd for IV pole   Additional items Rolling walker   Activity Tolerance   Activity Tolerance Patient limited by fatigue   Medical Staff Made Aware PT Cynthia   Nurse Made Aware SCOTT Holland   RUJASON Assessment   RUE Assessment WFL   LUE Assessment   LUE Assessment WFL   Cognition   Overall Cognitive Status WFL   Arousal/Participation Alert   Attention Within functional limits   Orientation Level Oriented X4   Memory Within functional limits   Following Commands Follows all commands and directions without difficulty   Comments Poor safety awareness   Assessment   Limitation Decreased ADL status; Decreased Safe judgement during ADL;Decreased endurance;Decreased self-care trans;Decreased high-level ADLs   Prognosis Good   Assessment Pt is a 68 y o  male seen for OT evaluation at [address] [place] [address], admitted 3/5/2021 w/ UTI (urinary tract infection)  OT completed extensive review of pt's medical and social history  Comorbidities affecting pt's functional performance at time of assessment include: hyperglycemia, HTN, Non-STEMI, ambulatory dysfunction, UTI, osteoporosis  Personal factors affecting pt at time of IE include:steps to enter environment, limited home support, behavioral pattern, difficulty performing ADLS, difficulty performing IADLS , limited insight into deficits, decreased initiation and engagement  and health management   Prior to admission, pt was living alone in [facility], 1st fl setup, 3STE, full flight to basement laundry and was independent with ADL/IADL, driving   Pt had recent admission to [facility] and has since been staying with his brother, where he was struggling to perform self care and ambulation  Upon evaluation, pt presents to OT below baseline due to the following performance deficits: weakness, decreased strength, decreased balance, decreased tolerance, impaired problem solving and decreased safety awareness  Pt to benefit from continued skilled OT tx while in the hospital to address deficits as defined above and maximize level of functional independence w ADL's and functional mobility  Occupational Performance areas to address include: bathing/shower, toilet hygiene, dressing, functional mobility and functional transfers, bed mobility  The patient's raw score on the AM-PAC Daily Activity inpatient short form is 18, standardized score is 38 66, less than 39 4  Patients at this level are likely to benefit from DC to post-acute rehabilitation services  Based on findings, pt is of high complexity  At this time, OT recommendations at time of discharge are short term rehab  Goals   Patient Goals Get stronger   Plan   Treatment Interventions ADL retraining;Functional transfer training;UE strengthening/ROM; Endurance training;Cognitive reorientation;Patient/family training;Equipment evaluation/education; Compensatory technique education;Continued evaluation; Energy conservation   Goal Expiration Date 03/18/21   OT Frequency 3-5x/wk   Recommendation   OT Discharge Recommendation Post-Acute Rehabilitation Services   OT - OK to Discharge Yes  (to STR when medically stable)   AM-PAC Daily Activity Inpatient   Lower Body Dressing 2   Bathing 2   Toileting 3   Upper Body Dressing 3   Grooming 4   Eating 4   Daily Activity Raw Score 18   Daily Activity Standardized Score (Calc for Raw Score >=11) 38 66   AM-[facility] Applied Cognition Inpatient   Following a Speech/Presentation 4   Understanding Ordinary Conversation 4   Taking Medications 3   Remembering Where Things Are Placed or Put Away 3   Remembering List of 4-5 Errands 3   Taking Care of Complicated Tasks 3   Applied Cognition Raw Score 20   Applied Cognition Standardized Score 41 76     Pt will achieve the following goals within 10 days  *Pt will complete grooming with independence  *Pt will complete UB bathing and dressing with independence  *Pt will complete LB bathing and dressing with independence   *Pt will complete toileting (hygiene and clothing management) with independence    *Pt will complete bed mobility with independence, with bed flat and no side rail to prep for purposeful tasks    *Pt will perform functional transfers with modified independence in order to complete ADL routine  *Pt will increase standing tolerance to 5+ minutes in order to complete sinkside ADL  *Pt will complete item retrieval and light home management with supervision while demonstrating good safety  *Pt will demonstrate increased activity tolerance in order to complete ADL routine  *Pt will participate in cognitive assessment to determine level of safety for returning home    *Pt will participate in UE therapeutic exercise in order to maximize strength for ADL transfers  *Pt will identify 3-5 fall risks to ensure safety upon discharge      [employer], MS, OTR/L

## 2021-03-08 NOTE — ASSESSMENT & PLAN NOTE
Lab Results   Component Value Date    HGBA1C 7 5 (H) 02/28/2021       Recent Labs     03/07/21  1606 03/07/21  2121 03/08/21  0746 03/08/21  1113   POCGLU 203* 189* 142* 192*       Blood Sugar Average: Last 72 hrs:  (P) 782 [id number] Continue on NovoLog 70/30 15 units SC b i d   Accu-Chek a c  HS with Humalog sliding scale

## 2021-03-08 NOTE — PLAN OF CARE
Problem: Potential for Falls  Goal: Patient will remain free of falls  Description: INTERVENTIONS:  - Assess patient frequently for physical needs  -  Identify cognitive and physical deficits and behaviors that affect risk of falls    -  [place] fall precautions as indicated by assessment   - Educate patient/family on patient safety including physical limitations  - Instruct patient to call for assistance with activity based on assessment  - Modify environment to reduce risk of injury  - Consider OT/PT consult to assist with strengthening/mobility  Outcome: Progressing     Problem: Prexisting or High Potential for Compromised Skin Integrity  Goal: Skin integrity is maintained or improved  Description: INTERVENTIONS:  - Identify patients at risk for skin breakdown  - Assess and monitor skin integrity  - Assess and monitor nutrition and hydration status  - Monitor labs   - Assess for incontinence   - Turn and reposition patient  - Assist with mobility/ambulation  - Relieve pressure over bony prominences  - Avoid friction and shearing  - Provide appropriate hygiene as needed including keeping skin clean and dry  - Evaluate need for skin moisturizer/barrier cream  - Collaborate with interdisciplinary team   - Patient/family teaching  - Consider wound care consult   Outcome: Progressing     Problem: CARDIOVASCULAR - ADULT  Goal: Maintains optimal cardiac output and hemodynamic stability  Description: INTERVENTIONS:  - Monitor I/O, vital signs and rhythm  - Monitor for S/S and trends of decreased cardiac output  - Administer and titrate ordered vasoactive medications to optimize hemodynamic stability  - Assess quality of pulses, skin color and temperature  - Assess for signs of decreased coronary artery perfusion  - Instruct patient to report change in severity of symptoms  Outcome: Progressing     Problem: RESPIRATORY - ADULT  Goal: Achieves optimal ventilation and oxygenation  Description: INTERVENTIONS:  - Assess for changes in respiratory status  - Assess for changes in mentation and behavior  - Position to facilitate oxygenation and minimize respiratory effort  - Oxygen administered by appropriate delivery if ordered  - Initiate smoking cessation education as indicated  - Encourage broncho-pulmonary hygiene including cough, deep breathe, Incentive Spirometry  - Assess the need for suctioning and aspirate as needed  - Assess and instruct to report SOB or any respiratory difficulty  - Respiratory Therapy support as indicated  Outcome: Progressing     Problem: MUSCULOSKELETAL - ADULT  Goal: Maintain or return mobility to safest level of function  Description: INTERVENTIONS:  - Assess patient's ability to carry out ADLs; assess patient's baseline for ADL function and identify physical deficits which impact ability to perform ADLs (bathing, care of mouth/teeth, toileting, grooming, dressing, etc )  - Assess/evaluate cause of self-care deficits   - Assess range of motion  - Assess patient's mobility  - Assess patient's need for assistive devices and provide as appropriate  - Encourage maximum independence but intervene and supervise when necessary  - Involve family in performance of ADLs  - Assess for home care needs following discharge   - Consider OT consult to assist with ADL evaluation and planning for discharge  - Provide patient education as appropriate  Outcome: Progressing     Problem: PAIN - ADULT  Goal: Verbalizes/displays adequate comfort level or baseline comfort level  Description: Interventions:  - Encourage patient to monitor pain and request assistance  - Assess pain using appropriate pain scale  - Administer analgesics based on type and severity of pain and evaluate response  - Implement non-pharmacological measures as appropriate and evaluate response  - Consider cultural and social influences on pain and pain management  - Notify physician/advanced practitioner if interventions unsuccessful or patient reports new pain  Outcome: Progressing     Problem: SAFETY ADULT  Goal: Patient will remain free of falls  Description: INTERVENTIONS:  - Assess patient frequently for physical needs  -  Identify cognitive and physical deficits and behaviors that affect risk of falls    -  [place] fall precautions as indicated by assessment   - Educate patient/family on patient safety including physical limitations  - Instruct patient to call for assistance with activity based on assessment  - Modify environment to reduce risk of injury  - Consider OT/PT consult to assist with strengthening/mobility  Outcome: Progressing  Goal: Maintain or return to baseline ADL function  Description: INTERVENTIONS:  -  Assess patient's ability to carry out ADLs; assess patient's baseline for ADL function and identify physical deficits which impact ability to perform ADLs (bathing, care of mouth/teeth, toileting, grooming, dressing, etc )  - Assess/evaluate cause of self-care deficits   - Assess range of motion  - Assess patient's mobility; develop plan if impaired  - Assess patient's need for assistive devices and provide as appropriate  - Encourage maximum independence but intervene and supervise when necessary  - Involve family in performance of ADLs  - Assess for home care needs following discharge   - Consider OT consult to assist with ADL evaluation and planning for discharge  - Provide patient education as appropriate  Outcome: Progressing  Goal: Maintain or return mobility status to optimal level  Description: INTERVENTIONS:  - Assess patient's baseline mobility status (ambulation, transfers, stairs, etc )    - Identify cognitive and physical deficits and behaviors that affect mobility  - Identify mobility aids required to assist with transfers and/or ambulation (gait belt, sit-to-stand, lift, walker, cane, etc )  - [place] fall precautions as indicated by assessment  - Record patient progress and toleration of activity level on Mobility SBAR; progress patient to next Phase/Stage  - Instruct patient to call for assistance with activity based on assessment  - Consider rehabilitation consult to assist with strengthening/weightbearing, etc   Outcome: Progressing     Problem: DISCHARGE PLANNING  Goal: Discharge to home or other facility with appropriate resources  Description: INTERVENTIONS:  - Identify barriers to discharge w/patient and caregiver  - Arrange for needed discharge resources and transportation as appropriate  - Identify discharge learning needs (meds, wound care, etc )  - Arrange for interpretive services to assist at discharge as needed  - Refer to Case Management Department for coordinating discharge planning if the patient needs post-hospital services based on physician/advanced practitioner order or complex needs related to functional status, cognitive ability, or social support system  Outcome: Progressing     Problem: Knowledge Deficit  Goal: Patient/family/caregiver demonstrates understanding of disease process, treatment plan, medications, and discharge instructions  Description: Complete learning assessment and assess knowledge base    Interventions:  - Provide teaching at level of understanding  - Provide teaching via preferred learning methods  Outcome: Progressing

## 2021-03-08 NOTE — PHYSICAL THERAPY NOTE
PHYSICAL THERAPY EVAL       03/08/21 0900   PT Last Visit   PT Visit Date 03/08/21   Note Type   Note type Evaluation   Pain Assessment   Pain Assessment Tool 0-10   Pain Score No Pain   Home Living   Type of Home House   Home Layout Two level  (3STE, 1st floor setup  Full flight to laundry)   [address]   [facility]   Prior Function   Level of [place] Independent with ADLs and functional mobility   Lives With Alone   ADL Assistance Independent   IADLs Independent   Comments Was ind prior to first hospitilization at [facility]   Restrictions/Precautions   Weight Bearing Precautions Per Order No   Other Precautions Fall Risk;Multiple lines   General   Additional Pertinent History Recent MI   Family/Caregiver Present No   Cognition   Overall Cognitive Status WFL   Arousal/Participation Alert   Orientation Level Oriented X4   Memory Within functional limits   Following Commands Follows all commands and directions without difficulty   Comments Decreased safety awareness   RLE Assessment   RLE Assessment WFL   LLE Assessment   LLE Assessment WFL   Coordination   Sensation WFL   Light Touch   RLE Light Touch Grossly intact   LLE Light Touch Grossly intact   Bed Mobility   Additional Comments Received OOB in chair   Transfers   Sit to Stand 4  Minimal assistance   Additional items Assist x 2;Armrests; Increased time required;Verbal cues   Stand to Sit 4  Minimal assistance   Additional items Assist x 2;Armrests; Increased time required;Verbal cues   Ambulation/Elevation   Gait pattern Short stride; Foward flexed; Shuffling   Gait Assistance 4  Minimal assist   Additional items Assist x 1;Verbal cues; Tactile cues   Assistive Device Rolling walker   Distance 40ft x 2   Balance   Static Sitting Normal   Dynamic Sitting Good   Static Standing Fair +   Dynamic Standing [address]   Ambulatory Fair Endurance Deficit   Endurance Deficit Yes   Activity Tolerance   Activity Tolerance Patient limited by fatigue   Medical Staff Made Aware OTTO banks   Nurse Made Aware SCOTT Holland   Assessment   Prognosis Good   Problem List Decreased strength;Decreased endurance; Impaired balance;Decreased mobility; Decreased safety awareness   Assessment Patient is a 79y/o M who presented from home with ambulatory dysfunction and fall risk  Found to have a UTI  Recent discharge from [address] march 3rd with MI and s/p PCI of LAD  PMH significant for MI, DM, smoker, HTN, HLD  Lives alone in a home with steps to enter and a first floor setup  Was ind but recently was staying at brothers in a single story home with 1STE  Uses a cane  Current medical status includes fall risk, fatigue, poor safety awareness, decreased strength, balance endurance and mobility  Performed all mobility with assistance  Unsteady gait without use of RW  Slow gait with shuffling steps  Fatigued easily  He is deconditioned and not at his functional baseline  Recommending rehab  The patient's AM-PAC Basic Mobility Inpatient Short Form Raw Score is 16, Standardized Score is 38 32  A standardized score less than 42 9 suggests the patient may benefit from discharge to post-acute rehabilitation services  Please also refer to the recommendation of the Physical Therapist for safe discharge planning  Barriers to Discharge Inaccessible home environment;Decreased caregiver support   Goals   Patient Goals To get stronger   STG Expiration Date 03/22/21   Short Term Goal #1 1  Perform supine<>sit with HOB flat without the use of bedrails supervision level 2  Perform sit< stand transfers with use of UE's supervision 3  Amb 300ft with SPC supervision level 4  Ascend/descend 3 steps with railing supervision   Plan   Treatment/Interventions Functional transfer training;LE strengthening/ROM; Elevations; Therapeutic exercise; Endurance training;Equipment eval/education; Bed mobility;Gait training;Spoke to nursing;OT   PT Frequency Other (Comment)  (3-5x/wk)   Recommendation   PT Discharge Recommendation Post-Acute Rehabilitation Services   Equipment Recommended Walker   PT - OK to Discharge Yes   Additional Comments When medically stable to rehab   Scarlett Hutchinson 435   Turning in Bed Without Bedrails 3   Lying on Back to Sitting on Edge of Flat Bed 3   Moving Bed to Chair 3   Standing Up From Chair 2   Walk in Room 3   Climb 3-5 Stairs 2   Basic Mobility Inpatient Raw Score 16   Basic Mobility Standardized Score 38 32   Jennifer Rodriguez, PT            Patient Name: Wade Jarvis  CSXIG'V Date: 3/8/2021

## 2021-03-08 NOTE — CASE MANAGEMENT
LOS 3  Readmission  [facility] 2/28-3/3/21  Not a bundle  Green readmission score of 16     Met with patient to explain CM role and discuss DCP  Pt [facility] alone  [facility] [place] [facility] 3 SHERIDAN and first floor set up  P/t admission to [facility] on 2/28 pt was independent w/ ADL's and ambulation with a cane  Pt had been driving and working part-time at [employer]  During last admission therapy had recommending inpt rehab but pt had refused same and home care-now readmitted w/ ambulatory dysfunction  Pt is alert and appropriate  No h/o VNa  DME: cane, RW shower chair and shower chair  A post acute care recommendation was made by your care team for STR  Discussed Freedom of Choice with patient  List of facilities given to patient via in person  patient aware the list is custom filtered for them by zip code location and that [facility] post acute providers are designated  Pt is now agreeable to inpt rehab  His first choice is [facility] as he has been there in past  His second choice is [facility] and third choice [facility]  This CM explained that referrals will be sent but need to make sure facility will accept Life Vest  Referrals placed in [employer]  Primary contact and POA is Prasad little [phone]  Pt has LW    PC[facility]: RU[facility]  Denies h/o mental health and substance abuse treatment in past  Will follow

## 2021-03-08 NOTE — UTILIZATION REVIEW
Initial Clinical Review    Admission: Date/Time/Statement:   Admission Orders (From admission, onward)     Ordered        03/05/21 1754  Inpatient Admission  Once                   Orders Placed This Encounter   Procedures    Inpatient Admission     Standing Status:   Standing     Number of Occurrences:   1     Order Specific Question:   Level of Care     Answer:   Med Surg [16]     Order Specific Question:   Estimated length of stay     Answer:   More than 2 Midnights     Order Specific Question:   Certification     Answer:   I certify that inpatient services are medically necessary for this patient for a duration of greater than two midnights  See H&P and MD Progress Notes for additional information about the patient's course of treatment  ED Arrival Information     Expected Arrival Acuity Means of Arrival Escorted By Service Admission Type    - 3/5/2021 16:20 Less Urgent Wheelchair Family Member General Medicine Urgent    Arrival Complaint    gait  dysfunction        Chief Complaint   Patient presents with    Gait Problem     To ED with neice for placement  States that patient was discharged from the hospital 2 days ago and is to weak and needs help  States that she was referred for patient to be placed in a rehab or admitted to the hospital by provider  Patient has no complaints and transferred to bed with one assist      Assessment/Plan: 67 yo m with cardiomyopathy ef 24%, CAD recent non STEMI type 1, he has a life vest,  DM, HTN, HLD  He had a recent admission to [facility]( 2/28-3/3 )   was discharged  to his brother's home  He is brought to the ED by his niece because he is unsafe due to ambulatory dysfunction and high risk for falls, she spoke to his PC and a  and was advised to bring him to the ED for rehab placement  He required an assist of 2  to get to the stretcher from his wheelchair  He is admitted inpatient due to ambulatory dysfunction,           ED Triage Vitals   Temperature Pulse Respirations Blood Pressure SpO2   03/05/21 1640 03/05/21 1640 03/05/21 1640 03/05/21 1640 03/05/21 1640   98 2 °F (36 8 °C) 72 20 123/70 100 %      Temp Source Heart Rate Source Patient Position - Orthostatic VS BP Location FiO2 (%)   03/05/21 1640 03/05/21 1640 03/05/21 1640 03/05/21 1640 --   Tympanic Monitor Lying Right arm       Pain Score       03/05/21 2014       No Pain          Wt Readings from Last 1 Encounters:   03/08/21 82 8 kg (182 lb 8 7 oz)     Additional Vital Signs:   Date/Time  Temp  Pulse  Resp  BP  MAP (mmHg)  SpO2  O2 Device  Patient Position - Orthostatic VS   03/06/21 0815  --  --  --  --  --  --  None (Room air)  --   03/06/21 08:13:25  --  74  --  100/50  67  95 %  --  --   03/06/21 07:34:29  98 8 °F (37 1 °C)  83  18  109/48Abnormal   68  94 %  --  --   03/05/21 21:04:49  97 6 °F (36 4 °C)  72  --  121/65  84  98 %  --  --   03/05/21 2014  --  --  --  --  --  --  None (Room air)  --   03/05/21 20:02:47  --  73  --  --  --  96 %  --  --   03/05/21 19:45:13  97 1 °F (36 2 °C)Abnormal   73  16  124/66  85  --  --  --   03/05/21 19:44:21  --  --  --  124/66  85  --  --  --   03/05/21 1645  --  73  --  116/55  79  95 %  --               Pertinent Labs/Diagnostic Test Results:   Results from last 7 days   Lab Units 03/05/21  1727   SARS-COV-2  Negative     Results from last 7 days   Lab Units 03/08/21  0504 03/07/21  0447 03/06/21  0554 03/05/21  1653   WBC Thousand/uL 7 19 6 57 6 18 6 38   HEMOGLOBIN g/dL 11 7* 11 7* 11 9* 13 1   HEMATOCRIT % 35 6* 36 9 37 1 41 3   PLATELETS Thousands/uL 235 236 237 252   NEUTROS ABS Thousands/µL 4 90 4 42 4 24 4 43         Results from last 7 days   Lab Units 03/08/21  0504 03/06/21  0554 03/05/21  1653   SODIUM mmol/L 137 138 138   POTASSIUM mmol/L 4 2 4 0 4 4   CHLORIDE mmol/L 103 104 102   CO2 mmol/L 24 27 29   ANION GAP mmol/L 10 7 7   BUN mg/dL 20 22 28*   CREATININE mg/dL 0 88 0 91 1 00   EGFR ml/min/1 73sq m 82 80 72   CALCIUM mg/dL 8 5 8 4 8 9 MAGNESIUM mg/dL 1 9 2 0  --    PHOSPHORUS mg/dL 3 4 3 6  --      Results from last 7 days   Lab Units 03/06/21  0554   AST U/L 45   ALT U/L 70   ALK PHOS U/L 108   TOTAL PROTEIN g/dL 6 8   ALBUMIN g/dL 2 4*   TOTAL BILIRUBIN mg/dL 0 40     Results from last 7 days   Lab Units 03/08/21  0746 03/07/21  2121 03/07/21  1606 03/07/21  1128 03/07/21  0720 03/06/21  2109 03/06/21  1608 03/06/21  1150 03/06/21  0733 03/05/21  2101 03/03/21  1146 03/03/21  0733   POC GLUCOSE mg/dl 142* 189* 203* 208* 173* 219* 241* 301* 195* 383* 278*  278* 196*  196*     Results from last 7 days   Lab Units 03/08/21  0504 03/06/21  0554 03/05/21  1653   GLUCOSE RANDOM mg/dL 130 172* 183*                             Results from last 7 days   Lab Units 03/06/21  0554 03/05/21  1653 03/02/21  0716   PROTIME seconds 15 6* 15 1*  --    INR  1 24* 1 19  --    PTT seconds  --  29 27                     Results from last 7 days   Lab Units 03/05/21  2046   CLARITY UA  Clear   COLOR UA  Yellow   SPEC GRAV UA  1 020   PH UA  5 5   GLUCOSE UA mg/dl Negative   KETONES UA mg/dl Negative   BLOOD UA  Negative   PROTEIN UA mg/dl Negative   NITRITE UA  Negative   BILIRUBIN UA  Negative   UROBILINOGEN UA E U /dl >=8 0*   LEUKOCYTES UA  Trace*   WBC UA /hpf 10-20*   RBC UA /hpf None Seen   BACTERIA UA /hpf Moderate*   EPITHELIAL CELLS WET PREP /hpf Occasional   MUCUS THREADS  None Seen     Results from last 7 days   Lab Units 03/05/21  1727   INFLUENZA A PCR  Negative   INFLUENZA B PCR  Negative   RSV PCR  Negative           Past Medical History:   Diagnosis Date    Arthritis     Diabetes mellitus ([facility] )     Hyperlipidemia     NSTEMI (non-ST elevated myocardial infarction) ([place] [address] )     Osteoporosis      Present on Admission:   Hyperlipidemia   Hyperglycemia due to type 2 diabetes mellitus ([facility] )   Hypertension   Smokes a pipe      Admitting Diagnosis: Coronary artery disease [I25 10]  Ischemic cardiomyopathy [I25 5]  Difficulty walking [R26 2]  Ambulatory dysfunction [R26 2]  Age/Sex: 68 y o  male       Admission Orders:  Scheduled Medications:  aspirin, 81 mg, Oral, Daily  atorvastatin, 40 mg, Oral, Daily  cefTRIAXone, 1,000 mg, Intravenous, Q24H  cholecalciferol, 1,000 Units, Oral, Daily  vitamin B-12, 1,000 mcg, Oral, Daily  enoxaparin, 40 mg, Subcutaneous, Daily  insulin aspart protamine-insulin aspart, 15 Units, Subcutaneous, BID AC  insulin lispro, 2-12 Units, Subcutaneous, TID AC  metoprolol succinate, 12 5 mg, Oral, Daily  nicotine, 1 patch, Transdermal, Daily  ticagrelor, 90 mg, Oral, Q12H ROEL      Continuous IV Infusions:     PRN Meds:  acetaminophen, 650 mg, Oral, Q6H PRN  nitroglycerin, 0 4 mg, Sublingual, Q5 Min PRN  ondansetron, 4 mg, Intravenous, Q6H PRN    Nursing Orders -  VS - I & O q shift - daily weights - SCD's to le's-  Diet cons carb - PT/OT eval     Network Utilization Review Department  ATTENTION: Please call with any questions or concerns to [phone] and carefully listen to the prompts so that you are directed to the right person  All voicemails are confidential   Duane Tyler all requests for admission clinical reviews, approved or denied determinations and any other requests to dedicated fax number below belonging to the campus where the patient is receiving treatment   List of dedicated fax numbers for the Facilities:  [address] DENIALS (Administrative/Medical Necessity) [phone]   [address] (Maternity/NICU/Pediatrics) [phone]   [address] Inez Schaefer 00[address] (  [facility]) [address] [place] [address] [phone]     [address] [phone]   [place] [address] [phone]

## 2021-03-08 NOTE — ASSESSMENT & PLAN NOTE
Urinalysis-  trace leucocytes, 10-20wbc and moderate bacteriuria  Urine cultures - No growth - UTI ruled out  DC IV Rocephin  Blood cultures negative till date

## 2021-03-08 NOTE — ASSESSMENT & PLAN NOTE
Patient with ambulatory dysfunction and at high risk for falls  Family discussed with  who recommended coming to ER for rehab placement    P T /OT consulted - they recommend rehab  Case management on board, looking for placement  Medically cleared for discharge

## 2021-03-08 NOTE — PLAN OF CARE
Problem: PHYSICAL THERAPY ADULT  Goal: Performs mobility at highest level of function for planned discharge setting  See evaluation for individualized goals  Description: Treatment/Interventions: Functional transfer training, LE strengthening/ROM, Elevations, Therapeutic exercise, Endurance training, Equipment eval/education, Bed mobility, Gait training, Spoke to nursing, OT  Equipment Recommended: Angelo Craven       See flowsheet documentation for full assessment, interventions and recommendations  Note: Prognosis: Good  Problem List: Decreased strength, Decreased endurance, Impaired balance, Decreased mobility, Decreased safety awareness  Assessment: Patient is a 81y/o M who presented from home with ambulatory dysfunction and fall risk  Found to have a UTI  Recent discharge from [address] march 3rd with MI and s/p PCI of LAD  PMH significant for MI, DM, smoker, HTN, HLD  Lives alone in a home with steps to enter and a first floor setup  Was ind but recently was staying at brothers in a single story home with 1STE  Uses a cane  Current medical status includes fall risk, fatigue, poor safety awareness, decreased strength, balance endurance and mobility  Performed all mobility with assistance  Unsteady gait without use of RW  Slow gait with shuffling steps  Fatigued easily  He is deconditioned and not at his functional baseline  Recommending rehab  The patient's AM-PAC Basic Mobility Inpatient Short Form Raw Score is 16, Standardized Score is 38 32  A standardized score less than 42 9 suggests the patient may benefit from discharge to post-acute rehabilitation services  Please also refer to the recommendation of the Physical Therapist for safe discharge planning  Barriers to Discharge: Inaccessible home environment, Decreased caregiver support     PT Discharge Recommendation: [address]     PT - OK to Discharge: Yes    See flowsheet documentation for full assessment

## 2021-03-08 NOTE — CASE MANAGEMENT
Alejandro arguelles, [facility] and [facility] unable to take pt with [address] and [facility] can accept  [facility] as he prefers to stay closer to home  They are submitting for auth   Pt will need WC van    **addendum [phone]  This CM left message with rep from Summer Solomon to let him know pt will be going to  for rehab  [phone]

## 2021-03-09 VITALS
SYSTOLIC BLOOD PRESSURE: 112 MMHG | BODY MASS INDEX: 26.32 KG/M2 | WEIGHT: 183.86 LBS | TEMPERATURE: 98.5 F | HEIGHT: 70 IN | HEART RATE: 72 BPM | RESPIRATION RATE: 18 BRPM | DIASTOLIC BLOOD PRESSURE: 56 MMHG | OXYGEN SATURATION: 95 %

## 2021-03-09 LAB
ANION GAP SERPL CALCULATED.3IONS-SCNC: 9 MMOL/L (ref 4–13)
BUN SERPL-MCNC: 20 MG/DL (ref 5–25)
CALCIUM SERPL-MCNC: 8.3 MG/DL (ref 8.3–10.1)
CHLORIDE SERPL-SCNC: 102 MMOL/L (ref 100–108)
CO2 SERPL-SCNC: 26 MMOL/L (ref 21–32)
CREAT SERPL-MCNC: 0.89 MG/DL (ref 0.6–1.3)
ERYTHROCYTE [DISTWIDTH] IN BLOOD BY AUTOMATED COUNT: 13.5 % (ref 11.6–15.1)
FLUAV RNA RESP QL NAA+PROBE: NEGATIVE
FLUBV RNA RESP QL NAA+PROBE: NEGATIVE
GFR SERPL CREATININE-BSD FRML MDRD: 82 ML/MIN/1.73SQ M
GLUCOSE SERPL-MCNC: 156 MG/DL (ref 65–140)
GLUCOSE SERPL-MCNC: 167 MG/DL (ref 65–140)
GLUCOSE SERPL-MCNC: 172 MG/DL (ref 65–140)
HCT VFR BLD AUTO: 38.1 % (ref 36.5–49.3)
HGB BLD-MCNC: 12.1 G/DL (ref 12–17)
MCH RBC QN AUTO: 31.1 PG (ref 26.8–34.3)
MCHC RBC AUTO-ENTMCNC: 31.8 G/DL (ref 31.4–37.4)
MCV RBC AUTO: 98 FL (ref 82–98)
PLATELET # BLD AUTO: 284 THOUSANDS/UL (ref 149–390)
PMV BLD AUTO: 10.9 FL (ref 8.9–12.7)
POTASSIUM SERPL-SCNC: 4.2 MMOL/L (ref 3.5–5.3)
RBC # BLD AUTO: 3.89 MILLION/UL (ref 3.88–5.62)
RSV RNA RESP QL NAA+PROBE: NEGATIVE
SARS-COV-2 RNA RESP QL NAA+PROBE: NEGATIVE
SODIUM SERPL-SCNC: 137 MMOL/L (ref 136–145)
WBC # BLD AUTO: 6.84 THOUSAND/UL (ref 4.31–10.16)

## 2021-03-09 PROCEDURE — 99239 HOSP IP/OBS DSCHRG MGMT >30: CPT | Performed by: INTERNAL MEDICINE

## 2021-03-09 PROCEDURE — 0241U HB NFCT DS VIR RESP RNA 4 TRGT: CPT | Performed by: INTERNAL MEDICINE

## 2021-03-09 PROCEDURE — 80048 BASIC METABOLIC PNL TOTAL CA: CPT | Performed by: INTERNAL MEDICINE

## 2021-03-09 PROCEDURE — 82948 REAGENT STRIP/BLOOD GLUCOSE: CPT

## 2021-03-09 PROCEDURE — 85027 COMPLETE CBC AUTOMATED: CPT | Performed by: INTERNAL MEDICINE

## 2021-03-09 RX ADMIN — NICOTINE 7 MG/24 HR DAILY TRANSDERMAL PATCH 1 PATCH: at 08:13

## 2021-03-09 RX ADMIN — INSULIN LISPRO 2 UNITS: 100 INJECTION, SOLUTION INTRAVENOUS; SUBCUTANEOUS at 08:14

## 2021-03-09 RX ADMIN — INSULIN ASPART 15 UNITS: 100 INJECTION, SUSPENSION SUBCUTANEOUS at 08:12

## 2021-03-09 RX ADMIN — CYANOCOBALAMIN TAB 500 MCG 1000 MCG: 500 TAB at 08:12

## 2021-03-09 RX ADMIN — INSULIN LISPRO 5 UNITS: 100 INJECTION, SOLUTION INTRAVENOUS; SUBCUTANEOUS at 12:05

## 2021-03-09 RX ADMIN — Medication 1000 UNITS: at 08:12

## 2021-03-09 RX ADMIN — ENOXAPARIN SODIUM 40 MG: 40 INJECTION SUBCUTANEOUS at 08:13

## 2021-03-09 RX ADMIN — TICAGRELOR 90 MG: 90 TABLET ORAL at 08:13

## 2021-03-09 RX ADMIN — INSULIN LISPRO 2 UNITS: 100 INJECTION, SOLUTION INTRAVENOUS; SUBCUTANEOUS at 12:05

## 2021-03-09 RX ADMIN — ATORVASTATIN CALCIUM 40 MG: 40 TABLET, FILM COATED ORAL at 08:13

## 2021-03-09 RX ADMIN — ASPIRIN 81 MG: 81 TABLET, COATED ORAL at 08:13

## 2021-03-09 RX ADMIN — INSULIN LISPRO 5 UNITS: 100 INJECTION, SOLUTION INTRAVENOUS; SUBCUTANEOUS at 08:14

## 2021-03-09 RX ADMIN — METOPROLOL SUCCINATE 12.5 MG: 25 TABLET, FILM COATED, EXTENDED RELEASE ORAL at 08:12

## 2021-03-09 NOTE — CASE MANAGEMENT
LOS 4 DAYS  Pt is medically clear for DC to [facility] today  RADHA Bustillo) obtained the [employer]  Call to [facility] scheduled for  at 36 today; Pt paid over the phone with credit card  Informed Nursing  Informed Dr Nely Rodriguez  Informed Pt  Informed Pt's brother (at Pt's request)  Informed Stacy Duke at [facility]  No other CM needs identified at this time  CM dept will continue to follow to DC

## 2021-03-09 NOTE — ASSESSMENT & PLAN NOTE
Patient with ambulatory dysfunction and at high risk for falls  Family discussed with  who recommended coming to ER for rehab placement    P T /OT consulted - they recommend rehab  Case management on board, placement gotten for [facility]  Medically cleared for discharge

## 2021-03-09 NOTE — ASSESSMENT & PLAN NOTE
Lab Results   Component Value Date    HGBA1C 7 5 (H) 02/28/2021       Recent Labs     03/08/21  1611 03/08/21 2031 03/08/21 2113 03/09/21  0731   POCGLU 157* 227* 232* 156*       Blood Sugar Average: Last 72 hrs:  (P) 202 5 Continue on NovoLog 70/30 15 units SC b i d   Accu-Chek a c  HS with Humalog sliding scale

## 2021-03-09 NOTE — ASSESSMENT & PLAN NOTE
Patient was recently discharged from [place] [address] on March 3rd with type 1 MI  Patient had troponin greater than 40 during that admission  Patient underwent cardiac catheterization which showed severe three-vessel CAD, s/p PCI of LAD  Cardiomyopathy with severely reduced LV systolic dysfunction ejection fraction 24% with grade 3 diastolic dysfunction  Patient was not considered a good candidate for CABG because of his comorbidities      continue with LifeVest placed  On aspirin, Brilinta, Lipitor, Toprol-XL  Denies any chest pain or shortness of breath

## 2021-03-09 NOTE — NURSING NOTE
Pt discharged [facility] Sherry Degree  Report called to receiving facility

## 2021-03-09 NOTE — DISCHARGE SUMMARY
Discharge- Alen Blackmon 1940, 68 y o  male MRN: [id number]    Unit/Bed#: -01 Encounter: [id number]    Primary Care Provider: Charo Lanza MD   Date and time admitted to hospital: 3/5/2021  4:23 PM        * Ambulatory dysfunction  Assessment & Plan  Patient with ambulatory dysfunction and at high risk for falls  Family discussed with  who recommended coming to ER for rehab placement  P T /OT consulted - they recommend rehab  Case management on board, [facility]  Medically cleared for discharge    UTI (urinary tract infection)  Assessment & Plan  Urinalysis-  trace leucocytes, 10-20wbc and moderate bacteriuria  Urine cultures - No growth - UTI ruled out  DC IV Rocephin  Blood cultures negative till date    Smokes a pipe  Assessment & Plan  Smoking cessation counseling given  On nicotine patch    Non-STEMI (non-ST elevated myocardial infarction) [facility])  Assessment & Plan  Patient was recently discharged from [place] [address] on March 3rd with type 1 MI  Patient had troponin greater than 40 during that admission  Patient underwent cardiac catheterization which showed severe three-vessel CAD, s/p PCI of LAD  Cardiomyopathy with severely reduced LV systolic dysfunction ejection fraction 24% with grade 3 diastolic dysfunction  Patient was not considered a good candidate for CABG because of his comorbidities      continue with LifeVest placed  On aspirin, Brilinta, Lipitor, Toprol-XL  Denies any chest pain or shortness of breath    Hypertension  Assessment & Plan  Continue on Toprol XL  Monitor vitals as per protocol    Hyperlipidemia  Assessment & Plan  On statin    Hyperglycemia due to type 2 diabetes mellitus [facility])  Assessment & Plan  Lab Results   Component Value Date    HGBA1C 7 5 (H) 02/28/2021       Recent Labs     03/08/21  1611 03/08/21  2031 03/08/21  2113 03/09/21  0731   POCGLU 157* 227* 232* 156*       Blood Sugar Average: Last 72 hrs:  (P) 202 5 Continue on NovoLog 70/30 15 units SC b i d   Accu-Chek a c  HS with Humalog sliding scale        Discharging Physician / Practitioner: Marcy Middleton MD  PCP: Viridiana Lee MD  Admission Date:   Admission Orders (From admission, onward)     Ordered        03/05/21 1754  Inpatient Admission  Once                   Discharge Date: 03/09/21    Resolved Problems  Date Reviewed: 3/8/2021    None          Consultations During Hospital Stay:  ·     Procedures Performed:   ·     Significant Findings / Test Results:   · 3/521 Chest Xray     FINDINGS:  Multiple defibrillator overlies the chest and limits evaluation      Cardiomediastinal silhouette appears unremarkable      The lungs are clear  No pneumothorax or pleural effusion      Osseous structures appear within normal limits for patient age      IMPRESSION:     No focal consolidation  Incidental Findings:   ·      Test Results Pending at Discharge (will require follow up):   ·      Outpatient Tests Requested:  ·     Complications:      Reason for Admission: Gait problem    Hospital Course:     Wade Jarvis is a 68 y o  male patient with PMH of CMP EF 24% on lifevest who originally presented to the hospital on 3/5/2021 due to ambulatory dysfunction  On presentation, urinalysis showed trace leucocytes, 10-20wbc and moderate bacteria, and he was placed on IV rocephin  Urine cultures returned with no growth and antibiotics were discontinued  HE was evaluated by PT and they recommended rehab  He is being discharged to [facility] rehab  Please see above list of diagnoses and related plan for additional information       Condition at Discharge: stable     Discharge Day Visit / Exam:     Subjective:  No overnight events, no complaints today  Vitals: Blood Pressure: 112/56 (03/09/21 0730)  Pulse: 72 (03/09/21 0730)  Temperature: 98 5 °F (36 9 °C) (03/09/21 0730)  Temp Source: Oral (03/05/21 1945)  Respirations: 18 (03/09/21 0730)  Height: 5' 10" (177 8 cm) (03/05/21 1945)  Weight - Scale: 83 4 kg (183 lb 13 8 oz) (03/09/21 0600)  SpO2: 95 % (03/09/21 0730)  Exam:   Physical Exam  Vitals signs and nursing note reviewed  Constitutional:       General: He is not in acute distress  Appearance: Normal appearance  He is not ill-appearing, toxic-appearing or diaphoretic  Cardiovascular:      Rate and Rhythm: Normal rate and regular rhythm  Pulses: Normal pulses  Heart sounds: Murmur present  Pulmonary:      Effort: Pulmonary effort is normal  No respiratory distress  Breath sounds: Normal breath sounds  No stridor  No wheezing, rhonchi or rales  Chest:      Chest wall: No tenderness  Abdominal:      General: Bowel sounds are normal  There is no distension  Palpations: Abdomen is soft  Tenderness: There is no abdominal tenderness  There is no right CVA tenderness, left CVA tenderness, guarding or rebound  Musculoskeletal: Normal range of motion  General: No swelling, tenderness, deformity or signs of injury  Right lower leg: No edema  Left lower leg: No edema  Skin:     General: Skin is warm and dry  Capillary Refill: Capillary refill takes less than 2 seconds  Coloration: Skin is not jaundiced or pale  Findings: No bruising, erythema, lesion or rash  Neurological:      General: No focal deficit present  Mental Status: He is alert  Mental status is at baseline  Cranial Nerves: No cranial nerve deficit  Psychiatric:         Mood and Affect: Mood normal          Thought Content: Thought content normal      Discussion with Family: Patient declined family update given no new medical changes, opted to update his brother  Discharge instructions/Information to patient and family:   See after visit summary for information provided to patient and family        Provisions for Follow-Up Care:  See after visit summary for information related to follow-up care and any pertinent home health orders  Disposition:     Other [place] at [address] [place] [address] to [facility]:   · Not Applicable to this Patient - Not Applicable to this Patient    Planned Readmission: No     Discharge Statement:  I spent 45 minutes discharging the patient  This time was spent on the day of discharge  I had direct contact with the patient on the day of discharge  Greater than 50% of the total time was spent examining patient, answering all patient questions, arranging and discussing plan of care with patient as well as directly providing post-discharge instructions  Additional time then spent on discharge activities  Discharge Medications:  See after visit summary for reconciled discharge medications provided to patient and family        ** Please Note: This note has been constructed using a voice recognition system **

## 2021-03-09 NOTE — PLAN OF CARE
Problem: Potential for Falls  Goal: Patient will remain free of falls  Description: INTERVENTIONS:  - Assess patient frequently for physical needs  -  Identify cognitive and physical deficits and behaviors that affect risk of falls    -  Gabriels fall precautions as indicated by assessment   - Educate patient/family on patient safety including physical limitations  - Instruct patient to call for assistance with activity based on assessment  - Modify environment to reduce risk of injury  - Consider OT/PT consult to assist with strengthening/mobility  Outcome: Progressing     Problem: Prexisting or High Potential for Compromised Skin Integrity  Goal: Skin integrity is maintained or improved  Description: INTERVENTIONS:  - Identify patients at risk for skin breakdown  - Assess and monitor skin integrity  - Assess and monitor nutrition and hydration status  - Monitor labs   - Assess for incontinence   - Turn and reposition patient  - Assist with mobility/ambulation  - Relieve pressure over bony prominences  - Avoid friction and shearing  - Provide appropriate hygiene as needed including keeping skin clean and dry  - Evaluate need for skin moisturizer/barrier cream  - Collaborate with interdisciplinary team   - Patient/family teaching  - Consider wound care consult   Outcome: Progressing     Problem: CARDIOVASCULAR - ADULT  Goal: Maintains optimal cardiac output and hemodynamic stability  Description: INTERVENTIONS:  - Monitor I/O, vital signs and rhythm  - Monitor for S/S and trends of decreased cardiac output  - Administer and titrate ordered vasoactive medications to optimize hemodynamic stability  - Assess quality of pulses, skin color and temperature  - Assess for signs of decreased coronary artery perfusion  - Instruct patient to report change in severity of symptoms  Outcome: Progressing     Problem: RESPIRATORY - ADULT  Goal: Achieves optimal ventilation and oxygenation  Description: INTERVENTIONS:  - Assess for changes in respiratory status  - Assess for changes in mentation and behavior  - Position to facilitate oxygenation and minimize respiratory effort  - Oxygen administered by appropriate delivery if ordered  - Initiate smoking cessation education as indicated  - Encourage broncho-pulmonary hygiene including cough, deep breathe, Incentive Spirometry  - Assess the need for suctioning and aspirate as needed  - Assess and instruct to report SOB or any respiratory difficulty  - Respiratory Therapy support as indicated  Outcome: Progressing     Problem: MUSCULOSKELETAL - ADULT  Goal: Maintain or return mobility to safest level of function  Description: INTERVENTIONS:  - Assess patient's ability to carry out ADLs; assess patient's baseline for ADL function and identify physical deficits which impact ability to perform ADLs (bathing, care of mouth/teeth, toileting, grooming, dressing, etc )  - Assess/evaluate cause of self-care deficits   - Assess range of motion  - Assess patient's mobility  - Assess patient's need for assistive devices and provide as appropriate  - Encourage maximum independence but intervene and supervise when necessary  - Involve family in performance of ADLs  - Assess for home care needs following discharge   - Consider OT consult to assist with ADL evaluation and planning for discharge  - Provide patient education as appropriate  Outcome: Progressing     Problem: PAIN - ADULT  Goal: Verbalizes/displays adequate comfort level or baseline comfort level  Description: Interventions:  - Encourage patient to monitor pain and request assistance  - Assess pain using appropriate pain scale  - Administer analgesics based on type and severity of pain and evaluate response  - Implement non-pharmacological measures as appropriate and evaluate response  - Consider cultural and social influences on pain and pain management  - Notify physician/advanced practitioner if interventions unsuccessful or patient reports new pain  Outcome: Progressing     Problem: SAFETY ADULT  Goal: Patient will remain free of falls  Description: INTERVENTIONS:  - Assess patient frequently for physical needs  -  Identify cognitive and physical deficits and behaviors that affect risk of falls    -  Steen fall precautions as indicated by assessment   - Educate patient/family on patient safety including physical limitations  - Instruct patient to call for assistance with activity based on assessment  - Modify environment to reduce risk of injury  - Consider OT/PT consult to assist with strengthening/mobility  Outcome: Progressing  Goal: Maintain or return to baseline ADL function  Description: INTERVENTIONS:  -  Assess patient's ability to carry out ADLs; assess patient's baseline for ADL function and identify physical deficits which impact ability to perform ADLs (bathing, care of mouth/teeth, toileting, grooming, dressing, etc )  - Assess/evaluate cause of self-care deficits   - Assess range of motion  - Assess patient's mobility; develop plan if impaired  - Assess patient's need for assistive devices and provide as appropriate  - Encourage maximum independence but intervene and supervise when necessary  - Involve family in performance of ADLs  - Assess for home care needs following discharge   - Consider OT consult to assist with ADL evaluation and planning for discharge  - Provide patient education as appropriate  Outcome: Progressing  Goal: Maintain or return mobility status to optimal level  Description: INTERVENTIONS:  - Assess patient's baseline mobility status (ambulation, transfers, stairs, etc )    - Identify cognitive and physical deficits and behaviors that affect mobility  - Identify mobility aids required to assist with transfers and/or ambulation (gait belt, sit-to-stand, lift, walker, cane, etc )  - Steen fall precautions as indicated by assessment  - Record patient progress and toleration of activity level on Mobility SBAR; progress patient to next Phase/Stage  - Instruct patient to call for assistance with activity based on assessment  - Consider rehabilitation consult to assist with strengthening/weightbearing, etc   Outcome: Progressing     Problem: DISCHARGE PLANNING  Goal: Discharge to home or other facility with appropriate resources  Description: INTERVENTIONS:  - Identify barriers to discharge w/patient and caregiver  - Arrange for needed discharge resources and transportation as appropriate  - Identify discharge learning needs (meds, wound care, etc )  - Arrange for interpretive services to assist at discharge as needed  - Refer to Case Management Department for coordinating discharge planning if the patient needs post-hospital services based on physician/advanced practitioner order or complex needs related to functional status, cognitive ability, or social support system  Outcome: Progressing     Problem: Knowledge Deficit  Goal: Patient/family/caregiver demonstrates understanding of disease process, treatment plan, medications, and discharge instructions  Description: Complete learning assessment and assess knowledge base    Interventions:  - Provide teaching at level of understanding  - Provide teaching via preferred learning methods  Outcome: Progressing

## 2021-03-17 ENCOUNTER — TELEPHONE (OUTPATIENT)
Dept: CARDIOLOGY CLINIC | Facility: CLINIC | Age: 81
End: 2021-03-17

## 2021-03-17 NOTE — TELEPHONE ENCOUNTER
Left message for patient to call office back to schedule an appointment  Offered Lyla or Ori parra office  Left number for Vicetne

## 2021-04-02 NOTE — PROGRESS NOTES
Follow Up   Office Visit Note  Tri Bolus   68 y o    male   MRN: [id number]  [place] [id number] [facility] [place] [address] [facility] [address] [phone]    PCP: Solange Urias MD  Cardiologist: Will be Dr Chao Carmen            Summary of recommendations  Smoking cessation advised  Is not interested in quitting  " I enjoy it "  Low Na diet/ HF education as below  Daily weights-   I provided him a scale for use  CBC, BMP, BNP, tomorrow  Cardiac rehab has been prescribed recommended  Adherence to dual antiplatelet therapy reinforced  Thus far, he tells me he has not been taking an aspirin  Consider adding an ACE-I or ARB at the next visit  Follow up will be scheduled with Dr Chao Carmen- new pt, 1 month        Assessment/plan  CAD  Recent admission for NSTEMI> LHC: 3VCAD  Poor target  Not a candidate for bypass  RCA was culprit for  MI, however was too late for PCI  Did undergo PCI/MOSHE -> LAD  - on aspirin 81, Brilinta 90 mg Q 12, high-intensity statin, beta-blocker, adm 3/5-3/9/21  ICM, EF 24%   Chronic combined heart failure  EF 24%  Grade 3 diastolic dysfsxn   he has not been weighing himself  He was provided a scale with instruction for use today  Today he is wearing heavy clothing  Wt Readings from Last 3 Encounters:   04/06/21 84 4 kg (186 lb)   03/09/21 83 4 kg (183 lb 13 8 oz)   02/28/21 81 6 kg (179 lb 14 3 oz)     Wt Readings from Last 3 Encounters:   04/06/21 84 4 kg (186 lb)   03/09/21 83 4 kg (183 lb 13 8 oz)   02/28/21 81 6 kg (179 lb 14 3 oz)      Beta-Blocker: metoprolol succinate 25 mg daily   ACEi, ARB or ARNi: none   Aldosterone Receptor Blocker:   Diuretic:NOne   ICD: TBD DCd with a LifeVest   Educated regarding adherence to a 1 5g -2 gram Na diet/ 1 5L(50oz)f luid restriction, daily weights and to call us if she gains  3 lbs/ 1 day or 5 lbs/ 1 week  Ventricular tachycardia, nonsustained  Hypertension, essential   /58  Hyperlipidemia, on atorvastatin 40 mg daily  2/28/1: LDL 79   Non HDL 92  Type 2 diabetes mellitus, on insulin  2/28/21: hemoglobin A1c 7 5  Tobacco abuse times 10 years  Smokes a pipe   Nicotine patch has ordered  Cessation advised  He is not interested  He tells me he enjoys smoking  Cardiac testing   TTE  3/1/21 EF 24%  No RWMA with regional wall motion abnormality  Grade 3 DD  Normal right ventricular size and systolic function  Mild left atrial and borderline right atrial cavity enlargement  Mild MR  LVIDd: 4 6 cm   Cardiac catheterization 3/1/21   Left main: Normal   Proximal LAD: There was a 90 % stenosis  Mid LAD: There was a 90 % stenosis  Mid circumflex: There was a 100 % stenosis  This lesion is a chronic total occlusion  Distal RCA: There was a 100 % stenosis  This lesion is a likely culprit for the patient's recent myocardial infarction 5 days ago  Distal vessel poor target/extremely small  Intervention:  MOSHE 90% proximal LAD  Hemodynamic assessment demonstrated moderately to severely elevated LVEDP  3V CAD, with completely occluded LCX and RCA poor distal targets for CABG  RCA culprit for MI (symptomatically 5 days ago)  Proceeded with PCI LAD with MOSHE  RECOMMENDATIONS  GDMT CAD  LCX occluded and too small distally meaningful revascularization/no target cabg  RCA culprit for MI 5 days ago(too late now in presentation consider pci)           BON Sheikh is a 69 yo male with tobacco abuse and diabetes; no known hx CAD, HF nor arrhythmia  He has been a diabetic for 10 years, on insulin  He has been a tobacco user for 10 years, currently smoking a pipe  He Initially presented to [facility] 2/28/21 with  dyspnea on exertion  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 [place]  He found it increasingly difficult to perform his usual activities  He admitted to chest pain 7 days prior  He denied having chest pain at the time of admission  He denied shortness of breath at rest    His 1st troponin was >40  His EKG showed no ST segment elevation  There were Q-waves noted in leads 2, 3 and AVF  An inferior MI was suspected, more than 3days-old  His NT proBNP was greater than 5000   Cardiac catheterization was pursued  This demonstrated multivessel CAD, with a completely occluded circumflex and RCA  There were poor distal targets for bypass  RCA was felt to be the culprit for his MI however was too late for presentation to consider PCI  He did undergo PCI and deployment of a drug-eluting stent to the 90% proximal LAD  He was placed on dual antiplatelet therapy with aspirin and Brilinta  He was also placed on high-intensity statin, and low-dose beta-blocker  An echocardiogram showed severe LV dysfunction, EF 24% with regional wall motion abnormalities; RV fxn was normal,mild MR, grade 3 diastolic dysfunction    It was recommended he be discharged to rehab, the patient declined and was discharged home 3/3/21 with a LifeVest    Discharge weight ? Discharge creatinine 1 19  proBNP 2/28/21 5600    Adm 3/5-3/9/21  Ambulatory dysfunction  A UTI was suspected  He was treated with antibiotics, and then discontinued given a normal urine culture  Discharged to [facility] rehab per PT recommendations  Discharge weight 183 lb 13 8 oz Discharge  Meds: Aspirin, ticagrelor, atorvastatin  Metoprolol succinate 12 5 mg daily  No diuretics     4/6/21   Hospital discharge  He is accompanied by his niece  He is living with family  He denies chest pain or shortness of breath  Unfortunately, he continues to smoke a pipe  He has no desire to quit  He tells me he enjoys it  We reviewed his medications  He is taking all except a baby aspirin  It is not clear why he is not  We discussed the importance of adherence to dual antiplatelet therapy  He is going to go home and take a baby aspirin  We discussed the importance of cardiac rehab    It has been prescribed and is recommended  It is not certain whether he will partake  He tells me he is no longer working at [employer] in [facility]  He has not been weighing himself  I gave him a scale with instructions for use  He was given written material about a low-sodium diet and reviewed how to read food labels to facilitate adherence  He was provided [facility] heart failure booklet  He is wearing the Life Vest   His compliance been very good  I reviewed the website  His heart rates have been around the mid 80s  He has had no discharges  He was under the impression that if his heart arteries were stented that he would not need the life vest anymore  We talked about the indication for the life vest   We talked about his most recent cardiac testing, his cardiac catheterization and echocardiogram   He is agreeable to wearing the life vest for a total of 12 weeks  He understands that has 1 purpose and that is to save his life he has a life-threatening arrhythmia  All questions were answered          I have spent 40 minutes with Patient and family today in which greater than 50% of this time was spent in counseling/coordination of care regarding Diagnostic results, Intructions for management, Patient and family education, Importance of tx compliance and Risk factor reductions  Assessment  Diagnoses and all orders for this visit:    Hospital discharge follow-up    Ischemic cardiomyopathy  -     CBC and Platelet; Future  -     Basic metabolic panel; Future  -     NT-BNP PRO; Future    Coronary artery disease involving native heart with angina pectoris, unspecified vessel or lesion type ([facility])    Hyperlipidemia, unspecified hyperlipidemia type  -     atorvastatin (LIPITOR) 40 mg tablet;  Take 1 tablet (40 mg total) by mouth daily    Ambulatory dysfunction    Essential hypertension    Non-STEMI (non-ST elevated myocardial infarction) ([facility])    NSTEMI (non-ST elevated myocardial infarction) ([facility])  -     metoprolol succinate (TOPROL-XL) 25 mg 24 hr tablet; Take 0 5 tablets (12 5 mg total) by mouth daily  -     atorvastatin (LIPITOR) 40 mg tablet; Take 1 tablet (40 mg total) by mouth daily  -     ticagrelor (BRILINTA) 90 MG; Take 1 tablet (90 mg total) by mouth every 12 (twelve) hours    Other orders  -     ketorolac (ACULAR) 0 5 % ophthalmic solution; instill 1 drop into right eye twice a day          Past Medical History:   Diagnosis Date    Arthritis     [facility]     Hyperlipidemia     NSTEMI (non-ST elevated myocardial infarction) ([facility] )     Osteoporosis        Review of Systems   Constitution: Negative for chills  Cardiovascular: Negative for chest pain, claudication, cyanosis, dyspnea on exertion, irregular heartbeat, leg swelling, near-syncope, orthopnea, palpitations, paroxysmal nocturnal dyspnea and syncope  Respiratory: Negative for cough and shortness of breath  Gastrointestinal: Negative for heartburn and nausea  Neurological: Negative for dizziness, focal weakness, headaches, light-headedness and weakness  All other systems reviewed and are negative  Allergies   Allergen Reactions    Metformin Diarrhea           Current Outpatient Medications:     alendronate (FOSAMAX) 70 mg tablet, once a week Takes on saturdays, Disp: , Rfl:     aspirin 81 MG tablet, Take 81 mg by mouth daily , Disp: , Rfl:     atorvastatin (LIPITOR) 40 mg tablet, Take 1 tablet (40 mg total) by mouth daily, Disp: 30 tablet, Rfl: 3    cholecalciferol (VITAMIN D3) 1,000 units tablet, Take by mouth daily , Disp: , Rfl:     cyanocobalamin (VITAMIN B-12) 1,000 mcg tablet, Take 1,000 mcg by mouth daily , Disp: , Rfl:     ergocalciferol (VITAMIN D2) 50,000 units, Take 50,000 Units by mouth once a week wednesdays, Disp: , Rfl: 0    glipiZIDE (GLIPIZIDE XL) 10 mg 24 hr tablet, Take 10 mg by mouth daily , Disp: , Rfl:     insulin NPH-insulin regular (NOVOLIN 70/30 RELION) 100 units/mL subcutaneous injection, Inject 15 Units under the skin 2 (two) times a day  , Disp: , Rfl:     ketorolac (ACULAR) 0 5 % ophthalmic solution, instill 1 drop into right eye twice a day, Disp: , Rfl:     metoprolol succinate (TOPROL-XL) 25 mg 24 hr tablet, Take 0 5 tablets (12 5 mg total) by mouth daily, Disp: 15 tablet, Rfl: 3    nitroglycerin (NITROSTAT) 0 4 mg SL tablet, Place 1 tablet (0 4 mg total) under the tongue every 5 (five) minutes as needed for chest pain, Disp: 21 tablet, Rfl: 0    pioglitazone (ACTOS) 45 mg tablet, Take 45 mg by mouth daily , Disp: , Rfl:     nicotine (NICODERM CQ) 7 mg/24hr TD 24 hr patch, Place 1 patch on the skin daily (Patient not taking: Reported on 3/5/2021), Disp: 28 patch, Rfl: 0    ticagrelor (BRILINTA) 90 MG, Take 1 tablet (90 mg total) by mouth every 12 (twelve) hours, Disp: 60 tablet, Rfl: 11        Social History     Socioeconomic History    Marital status: Single     Spouse name: Not on file    Number of children: Not on file    Years of education: Not on file    Highest education level: Not on file   Occupational History    Not on file   Social Needs    Financial resource strain: Not on file    Food insecurity     Worry: Not on file     Inability: Not on file    Transportation needs     Medical: Not on file     Non-medical: Not on file   Tobacco Use    Smoking status: Current Every Day Smoker     Packs/day: 0 00     Types: Pipe     Start date: 3/5/2011    Smokeless tobacco: Never Used    Tobacco comment: Patient states smokes 3 pipes/day   Substance and Sexual Activity    Alcohol use: Yes     Frequency: 4 or more times a week     Drinks per session: 1 or 2     Binge frequency: Never     Comment: pt states he usually has a small glass of wine most evenings    Drug use: No    Sexual activity: Not Currently   Lifestyle    Physical activity     Days per week: Not on file     Minutes per session: Not on file    Stress: Not on file   Relationships    Social connections     Talks on phone: Not on file     Gets together: Not on file     Attends Latter day service: Not on file     Active member of club or organization: Not on file     Attends meetings of clubs or organizations: Not on file     Relationship status: Not on file    Intimate partner violence     Fear of current or ex partner: Not on file     Emotionally abused: Not on file     Physically abused: Not on file     Forced sexual activity: Not on file   Other Topics Concern    Not on file   Social History Narrative    Not on file       Family History   Problem Relation Age of Onset    No Known Problems Mother     Arthritis Father     Diabetes unspecified Brother        Physical Exam   Constitutional: He is oriented to person, place, and time  No distress  HENT:   Head: Normocephalic and atraumatic  Eyes: Conjunctivae and EOM are normal    Neck: Normal range of motion  Neck supple  Cardiovascular: Normal rate, regular rhythm, normal heart sounds and intact distal pulses  Pulmonary/Chest: Effort normal  He has decreased breath sounds  Abdominal: Soft  Bowel sounds are normal    Musculoskeletal: Normal range of motion  Neurological: He is alert and oriented to person, place, and time  Skin: Skin is warm and dry  Psychiatric: He has a normal mood and affect  Nursing note and vitals reviewed  Vitals: Blood pressure 128/58, pulse 88, height 5' 10" (1 778 m), weight 84 4 kg (186 lb)     Wt Readings from Last 3 Encounters:   04/06/21 84 4 kg (186 lb)   03/09/21 83 4 kg (183 lb 13 8 oz)   02/28/21 81 6 kg (179 lb 14 3 oz)         Labs & Results:  Lab Results   Component Value Date    WBC 6 84 03/09/2021    HGB 12 1 03/09/2021    HCT 38 1 03/09/2021    MCV 98 03/09/2021     03/09/2021     No results found for: BNP  No components found for: CHEM  Total CK   Date Value Ref Range Status   02/28/2021 426 (H) 39 - 308 U/L Final     Troponin I   Date Value Ref Range Status   03/01/2021 30 86 (H) <=0 04 ng/mL Final Comment:     S[employer] Chemistry analyzer 99% cutoff is > 0 04 ng/mL in network labs     o cTnI 99% cutoff is useful only when applied to patients in the clinical setting of myocardial ischemia   o cTnI 99% cutoff should be interpreted in the context of clinical history, ECG findings and possibly cardiac imaging to establish correct diagnosis  o cTnI 99% cutoff may be suggestive but clearly not indicative of a coronary event without the clinical setting of myocardial ischemia      02/28/2021 31 55 (H) <=0 04 ng/mL Final     Comment:     S[employer]ens Chemistry analyzer 99% cutoff is > 0 04 ng/mL in network labs     o cTnI 99% cutoff is useful only when applied to patients in the clinical setting of myocardial ischemia   o cTnI 99% cutoff should be interpreted in the context of clinical history, ECG findings and possibly cardiac imaging to establish correct diagnosis  o cTnI 99% cutoff may be suggestive but clearly not indicative of a coronary event without the clinical setting of myocardial ischemia      02/28/2021 33 98 (H) <=0 04 ng/mL Final     Comment:     Siemens Chemistry analyzer 99% cutoff is > 0 04 ng/mL in network labs     o cTnI 99% cutoff is useful only when applied to patients in the clinical setting of myocardial ischemia   o cTnI 99% cutoff should be interpreted in the context of clinical history, ECG findings and possibly cardiac imaging to establish correct diagnosis  o cTnI 99% cutoff may be suggestive but clearly not indicative of a coronary event without the clinical setting of myocardial ischemia  CK-MB Index   Date Value Ref Range Status   02/28/2021 7 3 (H) 0 0 - 2 5 % Final     Results for orders placed during the hospital encounter of 02/28/21   Echo complete with contrast if indicated    Narrative [address] [facility]  Reggie [address]    [facility], [address]  [phone]    Transthoracic Echocardiogram  2D, M-mode, Doppler, and Color Doppler    Study date: 01-Mar-2021    Patient: Tabby Infante  MR number: [id number]  Account number: [de-identified]  : 1940  Age: 68 years  Gender: Male  Status: Inpatient  Location: Bedside  Height: 70 in  Weight: 178 6 lb  BP: 106/ 56 mmHg    Indications: Acute MI    Diagnoses: I21 4 - Non-ST elevation (NSTEMI) myocardial infarction    Sonographer:  Lindsay Worley RDCS  Primary Physician:  Dudley Garcia MD  Referring Physician:  Marlene Alves DO  Group:  [facility]  Interpreting Physician:  Nubia Bejarano MD    IMPRESSIONS:  Technical quality: Fair but adequate  Slightly suboptimal parasternal windows  Cardiac rhythm: Sinus    1  Normal left ventricular cavity size, markedly reduced left ventricular systolic function with regional wall motion abnormality  Ejection fraction is estimated as around 24 percent  Grade 3 diastolic dysfunction with elevated left  ventricular filling pressures  2  Normal right ventricular size and systolic function  3  Mild left atrial and borderline right atrial cavity enlargement  4  Aortic valve sclerosis with some focal calcification, trace aortic valve regurgitation  5  Mitral annular calcification and mitral valve sclerosis with some focal calcification, mild mitral valve regurgitation  6  Trace tricuspid valve regurgitation  7  No obvious pulmonary hypertension  8  No pericardial effusion  No previous echocardiogram is available for comparison  SUMMARY    LEFT VENTRICLE:  Normal left ventricular cavity size, normal wall thickness, severely reduced left ventricular systolic function  There is akinesis of the basal to mid inferior wall, sinus and severe hypokinesis of the entire inferolateral wall and  inferoseptal wall  Ejection fraction is estimated as around 24 percent  Grade 3 diastolic dysfunction, restrictive left ventricular filling pattern  Estimated left ventricular filling pressure is increased      RIGHT VENTRICLE:  Normal right ventricular size and systolic function  Normal estimated right ventricular systolic pressure, 29 mmHg  LEFT ATRIUM:  Mild left atrial cavity enlargement  Intact interatrial septum  RIGHT ATRIUM:  Borderline right atrial cavity enlargement  MITRAL VALVE:  Mitral annular calcification and mitral valve leaflet sclerosis with some focal calcification on anterior mitral valve leaflet  Adequate leaflet mobility  There is mild mitral valve regurgitation  No mitral valve stenosis  AORTIC VALVE:  Tricuspid aortic valve with sclerosis and some focal calcification, adequate cuspal separation  No aortic valve stenosis  Trace aortic valve regurgitation noted  TRICUSPID VALVE:  Trace tricuspid valve regurgitation  PULMONIC VALVE:  No significant pulmonic valve regurgitation  AORTA:  Aortic root and proximal ascending aorta are normal in size on 2D imaging  IVC, HEPATIC VEINS:  Inferior vena cava is normal in size and demonstrates appropriate respiratory phasic changes in diameter  PERICARDIUM:  No pericardial effusion  SUMMARY MEASUREMENTS  2D measurements:  Unspecified Anatomy:   %FS was 10 3 %  Ao Diam was 3 cm  EDV(Teich) was 97 7 ml   EF Biplane was 22 6 %  EF(Teich) was 22 5 %  ESV(Teich) was 75 7 ml  IVSd was 1 1 cm  LA Diam was 4 3 cm  LAAs A4C was 21 6 cm2  LAESV A-L A4C was 66  ml  LAESV MOD A4C was 59 8 ml  LALs A4C was 6 cm  LVEDV MOD A2C was 109 ml  LVEDV MOD A4C was 91 ml  LVEDV MOD BP was 102 1 ml  LVEDVInd MOD BP was 51 3 ml/m2  LVEF MOD A2C was 24 2 %  LVEF MOD A4C was 17 9 %  LVESV MOD A2C was  82 7 ml  LVESV MOD A4C was 74 7 ml  LVESV MOD BP was 79 ml  LVESVInd MOD BP was 39 7 ml/m2  LVIDd was 4 6 cm  LVIDs was 4 1 cm  LVLd A2C was 8 5 cm  LVLd A4C was 8 1 cm  LVLs A2C was 8 cm  LVLs A4C was 7 9 cm  LVPWd was 1 cm  Billy A4C was 17 cm2  RAEDV A-L was 46 2 ml  RAEDV MOD was 45 6 ml  RALd was 5 3 cm  RVIDd was 3 4 cm    SV MOD A2C was 26 4 ml   SV MOD A4C was 16 3 ml   SV(Teich) was 22 ml   CW measurements:  Unspecified Anatomy:   AV Env  Ti was 279 7 ms   AV VTI was 15 7 cm  AV Vmax was 0 8 m/s  AV Vmean was 0 6 m/s  AV maxPG was 2 8 mmHg  AV meanPG was 1 5 mmHg  TR Vmax was 2 2 m/s   TR maxPG was 19 2 mmHg  MM measurements:  Unspecified Anatomy:   TAPSE was 1 7 cm  PW measurements:  Unspecified Anatomy:   DVI was 0 9   E' Sept was 0 m/s  E/E' Sept was 21 5   LVOT Env  Ti was 324 8 ms  LVOT VTI was 13 6 cm  LVOT Vmax was 0 6 m/s  LVOT Vmean was 0 4 m/s  LVOT maxPG was 1 4 mmHg  LVOT meanPG was 0 8 mmHg  MV A  Neel was 0 3 m/s  MV Dec [place] was 6 1 m/s2  MV DecT was 158 ms  MV E Neel was 1 m/s  MV E/A Ratio was 2 9   MV PHT was 45 8 ms  MVA By PHT was 4 8 cm2  HISTORY: PRIOR HISTORY: HLD, HTN, DM, NSTEMI    PROCEDURE: The procedure was performed at the bedside  This was a stat study  The transthoracic approach was used  The study included complete 2D imaging, M-mode, complete spectral Doppler, and color Doppler  The heart rate was 79 bpm, at  the start of the study  Images were obtained from the parasternal, apical, subcostal, and suprasternal notch acoustic windows  Image quality was adequate  LEFT VENTRICLE: Normal left ventricular cavity size, normal wall thickness, severely reduced left ventricular systolic function  There is akinesis of the basal to mid inferior wall, sinus and severe hypokinesis of the entire inferolateral  wall and inferoseptal wall  Ejection fraction is estimated as around 24 percent  Grade 3 diastolic dysfunction, restrictive left ventricular filling pattern  Estimated left ventricular filling pressure is increased  RIGHT VENTRICLE: Normal right ventricular size and systolic function  Normal estimated right ventricular systolic pressure, 29 mmHg  LEFT ATRIUM: Mild left atrial cavity enlargement  Intact interatrial septum  RIGHT ATRIUM: Borderline right atrial cavity enlargement      MITRAL VALVE: Mitral annular calcification and mitral valve leaflet sclerosis with some focal calcification on anterior mitral valve leaflet  Adequate leaflet mobility  There is mild mitral valve regurgitation  No mitral valve stenosis  AORTIC VALVE: Tricuspid aortic valve with sclerosis and some focal calcification, adequate cuspal separation  No aortic valve stenosis  Trace aortic valve regurgitation noted  TRICUSPID VALVE: Trace tricuspid valve regurgitation  PULMONIC VALVE: No significant pulmonic valve regurgitation  PERICARDIUM: No pericardial effusion  AORTA: Aortic root and proximal ascending aorta are normal in size on 2D imaging  SYSTEMIC VEINS: IVC: Inferior vena cava is normal in size and demonstrates appropriate respiratory phasic changes in diameter  SYSTEM MEASUREMENT TABLES    2D  %FS: 10 3 %  Ao Diam: 3 cm  EDV(Teich): 97 7 ml  EF Biplane: 22 6 %  EF(Teich): 22 5 %  ESV(Teich): 75 7 ml  IVSd: 1 1 cm  LA Diam: 4 3 cm  LAAs A4C: 21 6 cm2  LAESV A-L A4C: 66 ml  LAESV MOD A4C: 59 8 ml  LALs A4C: 6 cm  LVEDV MOD A2C: 109 ml  LVEDV MOD A4C: 91 ml  LVEDV MOD BP: 102 1 ml  LVEDVInd MOD BP: 51 3 ml/m2  LVEF MOD A2C: 24 2 %  LVEF MOD A4C: 17 9 %  LVESV MOD A2C: 82 7 ml  LVESV MOD A4C: 74 7 ml  LVESV MOD BP: 79 ml  LVESVInd MOD BP: 39 7 ml/m2  LVIDd: 4 6 cm  LVIDs: 4 1 cm  LVLd A2C: 8 5 cm  LVLd A4C: 8 1 cm  LVLs A2C: 8 cm  LVLs A4C: 7 9 cm  LVPWd: 1 cm  Billy A4C: 17 cm2  RAEDV A-L: 46 2 ml  RAEDV MOD: 45 6 ml  RALd: 5 3 cm  RVIDd: 3 4 cm  SV MOD A2C: 26 4 ml  SV MOD A4C: 16 3 ml  SV(Teich): 22 ml    CW  AV Env  Ti: 279 7 ms  AV VTI: 15 7 cm  AV Vmax: 0 8 m/s  AV Vmean: 0 6 m/s  AV maxP 8 mmHg  AV meanP 5 mmHg  TR Vmax: 2 2 m/s  TR maxP 2 mmHg    MM  TAPSE: 1 7 cm    PW  DVI: 0 9  E' Sept: 0 m/s  E/E' Sept: 21 5  LVOT Env  Ti: 324 8 ms  LVOT VTI: 13 6 cm  LVOT Vmax: 0 6 m/s  LVOT Vmean: 0 4 m/s  LVOT maxP 4 mmHg  LVOT meanP 8 mmHg  MV A Neel: 0 3 m/s  MV Dec [place]: 6 1 m/s2  MV DecT: 158 ms  MV E Neel: 1 m/s  MV E/A Ratio: 2 9  MV PHT: 45 8 ms  MVA By PHT: 4 8 cm2    [employer] Accredited Echocardi[facility]    Prepared and electronically signed by    Jazlyn Brito MD  Signed 01-Mar-2021 10:45:22       No results found for this or any previous visit  This note was completed in part utilizing m-modal fluency direct voice recognition software  Grammatical errors, random word insertion, spelling mistakes, and incomplete sentences may be an occasional consequence of the system secondary to software limitations, ambient noise and hardware issues  At the time of dictation, efforts were made to edit, clarify and /or correct errors  Please read the chart carefully and recognize, using context, where substitutions have occurred    If you have any questions or concerns about the context, text or information contained within the body of this dictation, please contact myself, the provider, for further clarification

## 2021-04-06 ENCOUNTER — OFFICE VISIT (OUTPATIENT)
Dept: CARDIOLOGY CLINIC | Facility: CLINIC | Age: 81
End: 2021-04-06
Payer: COMMERCIAL

## 2021-04-06 ENCOUNTER — TELEPHONE (OUTPATIENT)
Dept: CARDIOLOGY CLINIC | Facility: CLINIC | Age: 81
End: 2021-04-06

## 2021-04-06 VITALS
HEART RATE: 88 BPM | HEIGHT: 70 IN | DIASTOLIC BLOOD PRESSURE: 58 MMHG | SYSTOLIC BLOOD PRESSURE: 128 MMHG | WEIGHT: 186 LBS | BODY MASS INDEX: 26.63 KG/M2

## 2021-04-06 DIAGNOSIS — I21.4 NON-STEMI (NON-ST ELEVATED MYOCARDIAL INFARCTION) (HCC): ICD-10-CM

## 2021-04-06 DIAGNOSIS — R26.2 AMBULATORY DYSFUNCTION: ICD-10-CM

## 2021-04-06 DIAGNOSIS — I25.119 CORONARY ARTERY DISEASE INVOLVING NATIVE HEART WITH ANGINA PECTORIS, UNSPECIFIED VESSEL OR LESION TYPE (HCC): ICD-10-CM

## 2021-04-06 DIAGNOSIS — I21.4 NSTEMI (NON-ST ELEVATED MYOCARDIAL INFARCTION) (HCC): ICD-10-CM

## 2021-04-06 DIAGNOSIS — Z09 HOSPITAL DISCHARGE FOLLOW-UP: Primary | ICD-10-CM

## 2021-04-06 DIAGNOSIS — I10 ESSENTIAL HYPERTENSION: ICD-10-CM

## 2021-04-06 DIAGNOSIS — I25.5 ISCHEMIC CARDIOMYOPATHY: ICD-10-CM

## 2021-04-06 DIAGNOSIS — E78.5 HYPERLIPIDEMIA, UNSPECIFIED HYPERLIPIDEMIA TYPE: ICD-10-CM

## 2021-04-06 PROCEDURE — 99215 OFFICE O/P EST HI 40 MIN: CPT | Performed by: NURSE PRACTITIONER

## 2021-04-06 RX ORDER — ATORVASTATIN CALCIUM 40 MG/1
40 TABLET, FILM COATED ORAL DAILY
Qty: 30 TABLET | Refills: 3 | Status: SHIPPED | OUTPATIENT
Start: 2021-04-06 | End: 2021-08-06

## 2021-04-06 RX ORDER — METOPROLOL SUCCINATE 25 MG/1
12.5 TABLET, EXTENDED RELEASE ORAL DAILY
Qty: 15 TABLET | Refills: 3 | Status: SHIPPED | OUTPATIENT
Start: 2021-04-06 | End: 2021-05-06 | Stop reason: SDUPTHER

## 2021-04-06 RX ORDER — KETOROLAC TROMETHAMINE 5 MG/ML
SOLUTION OPHTHALMIC
COMMUNITY
Start: 2020-12-30

## 2021-04-06 NOTE — LETTER
April 6, 2021     Edmar Ellis MD  [address]    Patient: Dania Harman   YOB: 1940   Date of Visit: 4/6/2021       Dear Dr Taj Balderas: Thank you for referring Lakesha Shown to me for evaluation  Below are my notes for this consultation  If you have questions, please do not hesitate to call me  I look forward to following your patient along with you  Sincerely,        ADALBERTO Sam        CC: Libby Will, ADALBERTO Putnam  4/6/2021  3:48 PM  Sign when Signing Visit   Follow Up   Office Visit Note  Dania Harman   68 y o    male   MRN: [id number]  [facility]  [address] [facility] [address]  [facility] [phone]    PCP: Edmar Ellis MD  Cardiologist: Will be Dr Julian Cruz            Summary of recommendations  Smoking cessation advised  Is not interested in quitting  " I enjoy it "  Low Na diet/ HF education as below  Daily weights-   I provided him a scale for use  CBC, BMP, BNP, tomorrow  Cardiac rehab has been prescribed recommended  Adherence to dual antiplatelet therapy reinforced  Thus far, he tells me he has not been taking an aspirin  Consider adding an ACE-I or ARB at the next visit  Follow up will be scheduled with Dr Julian Cruz- new pt, 1 month        Assessment/plan  CAD  Recent admission for NSTEMI> [facility]: 3VCAD  Poor target  Not a candidate for bypass  RCA was culprit for  MI, however was too late for PCI  Did undergo PCI/MOSHE -> LAD  - on aspirin 81, Brilinta 90 mg Q 12, high-intensity statin, beta-blocker, adm 3/5-3/9/21  ICM, EF 24%   Chronic combined heart failure  EF 24%  Grade 3 diastolic dysfsxn   he has not been weighing himself  He was provided a scale with instruction for use today  Today he is wearing heavy clothing      Wt Readings from Last 3 Encounters:   04/06/21 84 4 kg (186 lb)   03/09/21 83 4 kg (183 lb 13 8 oz)   02/28/21 81 6 kg (179 lb 14 3 oz) Wt Readings from Last 3 Encounters:   04/06/21 84 4 kg (186 lb)   03/09/21 83 4 kg (183 lb 13 8 oz)   02/28/21 81 6 kg (179 lb 14 3 oz)      Beta-Blocker: metoprolol succinate 25 mg daily   ACEi, ARB or ARNi: none   Aldosterone Receptor Blocker:   Diuretic:NOne   ICD: TBD DCd with a LifeVest   Educated regarding adherence to a 1 5g -2 gram Na diet/ 1 5L(50oz)f luid restriction, daily weights and to call us if she gains  3 lbs/ 1 day or 5 lbs/ 1 week  Ventricular tachycardia, nonsustained  Hypertension, essential   /58  Hyperlipidemia, on atorvastatin 40 mg daily  2/28/1: LDL 79   Non HDL 92  Type 2 diabetes mellitus, on insulin  2/28/21: hemoglobin A1c 7 5  Tobacco abuse times 10 years  Smokes a pipe   Nicotine patch has ordered  Cessation advised  He is not interested  He tells me he enjoys smoking  Cardiac testing   TTE  3/1/21 EF 24%  No RWMA with regional wall motion abnormality  Grade 3 DD  Normal right ventricular size and systolic function  Mild left atrial and borderline right atrial cavity enlargement  Mild MR  LVIDd: 4 6 cm   Cardiac catheterization 3/1/21   Left main: Normal   Proximal LAD: There was a 90 % stenosis  Mid LAD: There was a 90 % stenosis  Mid circumflex: There was a 100 % stenosis  This lesion is a chronic total occlusion  Distal RCA: There was a 100 % stenosis  This lesion is a likely culprit for the patient's recent myocardial infarction 5 days ago  Distal vessel poor target/extremely small  Intervention:  MOSHE 90% proximal LAD  Hemodynamic assessment demonstrated moderately to severely elevated LVEDP  3V CAD, with completely occluded LCX and RCA poor distal targets for CABG  RCA culprit for MI (symptomatically 5 days ago)  Proceeded with PCI LAD with MOSHE  RECOMMENDATIONS  GDMT CAD  LCX occluded and too small distally meaningful revascularization/no target cabg   RCA culprit for MI 5 days ago(too late now in presentation consider pci)           BON Cuevas Aniya Spicer is a 67 yo male with tobacco abuse and diabetes; no known hx CAD, HF nor arrhythmia  He has been a diabetic for 10 years, on insulin  He has been a tobacco user for 10 years, currently smoking a pipe  He Initially presented to [facility] 2/28/21 with  dyspnea on exertion  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 [place]  He found it increasingly difficult to perform his usual activities  He admitted to chest pain 7 days prior  He denied having chest pain at the time of admission  He denied shortness of breath at rest    His 1st troponin was >40  His EKG showed no ST segment elevation  There were Q-waves noted in leads 2, 3 and AVF  An inferior MI was suspected, more than 3days-old  His NT proBNP was greater than 5000   Cardiac catheterization was pursued  This demonstrated multivessel CAD, with a completely occluded circumflex and RCA  There were poor distal targets for bypass  RCA was felt to be the culprit for his MI however was too late for presentation to consider PCI  He did undergo PCI and deployment of a drug-eluting stent to the 90% proximal LAD  He was placed on dual antiplatelet therapy with aspirin and Brilinta  He was also placed on high-intensity statin, and low-dose beta-blocker  An echocardiogram showed severe LV dysfunction, EF 24% with regional wall motion abnormalities; RV fxn was normal,mild MR, grade 3 diastolic dysfunction    It was recommended he be discharged to rehab, the patient declined and was discharged home 3/3/21 with a LifeVest    Discharge weight ? Discharge creatinine 1 19  proBNP 2/28/21 5600    Adm 3/5-3/9/21  Ambulatory dysfunction  A UTI was suspected  He was treated with antibiotics, and then discontinued given a normal urine culture  Discharged to [facility] rehab per PT recommendations  Discharge weight 183 lb 13 8 oz Discharge  Meds: Aspirin, ticagrelor, atorvastatin  Metoprolol succinate 12 5 mg daily   No diuretics     4/6/21   Hospital discharge  He is accompanied by his niece  He is living with family  He denies chest pain or shortness of breath  Unfortunately, he continues to smoke a pipe  He has no desire to quit  He tells me he enjoys it  We reviewed his medications  He is taking all except a baby aspirin  It is not clear why he is not  We discussed the importance of adherence to dual antiplatelet therapy  He is going to go home and take a baby aspirin  We discussed the importance of cardiac rehab  It has been prescribed and is recommended  It is not certain whether he will partake  He tells me he is no longer working at [employer] in [facility]  He has not been weighing himself  I gave him a scale with instructions for use  He was given written material about a low-sodium diet and reviewed how to read food labels to facilitate adherence  He was provided [facility] heart failure booklet  He is wearing the Life Vest   His compliance been very good  I reviewed the website  His heart rates have been around the mid 80s  He has had no discharges  He was under the impression that if his heart arteries were stented that he would not need the life vest anymore  We talked about the indication for the life vest   We talked about his most recent cardiac testing, his cardiac catheterization and echocardiogram   He is agreeable to wearing the life vest for a total of 12 weeks  He understands that has 1 purpose and that is to save his life he has a life-threatening arrhythmia  All questions were answered          I have spent 40 minutes with Patient and family today in which greater than 50% of this time was spent in counseling/coordination of care regarding Diagnostic results, Intructions for management, Patient and family education, Importance of tx compliance and Risk factor reductions    Assessment  Diagnoses and all orders for this visit:    Hospital discharge follow-up    Ischemic cardiomyopathy  -     CBC and Platelet; Future  -     Basic metabolic panel; Future  -     NT-BNP PRO; Future    Coronary artery disease involving native heart with angina pectoris, unspecified vessel or lesion type ([facility])    Hyperlipidemia, unspecified hyperlipidemia type  -     atorvastatin (LIPITOR) 40 mg tablet; Take 1 tablet (40 mg total) by mouth daily    Ambulatory dysfunction    Essential hypertension    Non-STEMI (non-ST elevated myocardial infarction) ([facility])    NSTEMI (non-ST elevated myocardial infarction) ([facility])  -     metoprolol succinate (TOPROL-XL) 25 mg 24 hr tablet; Take 0 5 tablets (12 5 mg total) by mouth daily  -     atorvastatin (LIPITOR) 40 mg tablet; Take 1 tablet (40 mg total) by mouth daily  -     ticagrelor (BRILINTA) 90 MG; Take 1 tablet (90 mg total) by mouth every 12 (twelve) hours    Other orders  -     ketorolac (ACULAR) 0 5 % ophthalmic solution; instill 1 drop into right eye twice a day          Past Medical History:   Diagnosis Date    Arthritis     Diabetes mellitus ([facility] )     Hyperlipidemia     NSTEMI (non-ST elevated myocardial infarction) ([facility] )     Osteoporosis        Review of Systems   Constitution: Negative for chills  Cardiovascular: Negative for chest pain, claudication, cyanosis, dyspnea on exertion, irregular heartbeat, leg swelling, near-syncope, orthopnea, palpitations, paroxysmal nocturnal dyspnea and syncope  Respiratory: Negative for cough and shortness of breath  Gastrointestinal: Negative for heartburn and nausea  Neurological: Negative for dizziness, focal weakness, headaches, light-headedness and weakness  All other systems reviewed and are negative  Allergies   Allergen Reactions    Metformin Diarrhea           Current Outpatient Medications:     alendronate (FOSAMAX) 70 mg tablet, once a week Takes on saturdays, Disp: , Rfl:     aspirin 81 MG tablet, Take 81 mg by mouth daily , Disp: , Rfl:     atorvastatin (LIPITOR) 40 mg tablet, Take 1 tablet (40 mg total) by mouth daily, Disp: 30 tablet, Rfl: 3    cholecalciferol (VITAMIN D3) 1,000 units tablet, Take by mouth daily , Disp: , Rfl:     cyanocobalamin (VITAMIN B-12) 1,000 mcg tablet, Take 1,000 mcg by mouth daily , Disp: , Rfl:     ergocalciferol (VITAMIN D2) 50,000 units, Take 50,000 Units by mouth once a week wednesdays, Disp: , Rfl: 0    glipiZIDE (GLIPIZIDE XL) 10 mg 24 hr tablet, Take 10 mg by mouth daily , Disp: , Rfl:     insulin NPH-insulin regular (NOVOLIN 70/30 RELION) 100 units/mL subcutaneous injection, Inject 15 Units under the skin 2 (two) times a day  , Disp: , Rfl:     ketorolac (ACULAR) 0 5 % ophthalmic solution, instill 1 drop into right eye twice a day, Disp: , Rfl:     metoprolol succinate (TOPROL-XL) 25 mg 24 hr tablet, Take 0 5 tablets (12 5 mg total) by mouth daily, Disp: 15 tablet, Rfl: 3    nitroglycerin (NITROSTAT) 0 4 mg SL tablet, Place 1 tablet (0 4 mg total) under the tongue every 5 (five) minutes as needed for chest pain, Disp: 21 tablet, Rfl: 0    pioglitazone (ACTOS) 45 mg tablet, Take 45 mg by mouth daily , Disp: , Rfl:     nicotine (NICODERM CQ) 7 mg/24hr TD 24 hr patch, Place 1 patch on the skin daily (Patient not taking: Reported on 3/5/2021), Disp: 28 patch, Rfl: 0    ticagrelor (BRILINTA) 90 MG, Take 1 tablet (90 mg total) by mouth every 12 (twelve) hours, Disp: 60 tablet, Rfl: 11        Social History     Socioeconomic History    Marital status: Single     Spouse name: Not on file    Number of children: Not on file    Years of education: Not on file    Highest education level: Not on file   Occupational History    Not on file   Social Needs    Financial resource strain: Not on file    Food insecurity     Worry: Not on file     Inability: Not on file    Transportation needs     Medical: Not on file     Non-medical: Not on file   Tobacco Use    Smoking status: Current Every Day Smoker     Packs/day: 0 00     Types: Pipe     Start date: 3/5/2011    Smokeless tobacco: Never Used    Tobacco comment: Patient states smokes 3 pipes/day   Substance and Sexual Activity    Alcohol use: Yes     Frequency: 4 or more times a week     Drinks per session: 1 or 2     Binge frequency: Never     Comment: pt states he usually has a small glass of wine most evenings    Drug use: No    Sexual activity: Not Currently   Lifestyle    Physical activity     Days per week: Not on file     Minutes per session: Not on file    Stress: Not on file   Relationships    Social connections     Talks on phone: Not on file     Gets together: Not on file     Attends Synagogue service: Not on file     Active member of club or organization: Not on file     Attends meetings of clubs or organizations: Not on file     Relationship status: Not on file    Intimate partner violence     Fear of current or ex partner: Not on file     Emotionally abused: Not on file     Physically abused: Not on file     Forced sexual activity: Not on file   Other Topics Concern    Not on file   Social History Narrative    Not on file       Family History   Problem Relation Age of Onset    No Known Problems Mother     Arthritis Father     Diabetes unspecified Brother        Physical Exam   Constitutional: He is oriented to person, place, and time  No distress  HENT:   Head: Normocephalic and atraumatic  Eyes: Conjunctivae and EOM are normal    Neck: Normal range of motion  Neck supple  Cardiovascular: Normal rate, regular rhythm, normal heart sounds and intact distal pulses  Pulmonary/Chest: Effort normal  He has decreased breath sounds  Abdominal: Soft  Bowel sounds are normal    Musculoskeletal: Normal range of motion  Neurological: He is alert and oriented to person, place, and time  Skin: Skin is warm and dry  Psychiatric: He has a normal mood and affect  Nursing note and vitals reviewed        Vitals: Blood pressure 128/58, pulse 88, height 5' 10" (1 778 m), weight 84 4 kg (186 lb)  Wt Readings from Last 3 Encounters:   04/06/21 84 4 kg (186 lb)   03/09/21 83 4 kg (183 lb 13 8 oz)   02/28/21 81 6 kg (179 lb 14 3 oz)         Labs & Results:  Lab Results   Component Value Date    WBC 6 84 03/09/2021    HGB 12 1 03/09/2021    HCT 38 1 03/09/2021    MCV 98 03/09/2021     03/09/2021     No results found for: BNP  No components found for: CHEM  Total CK   Date Value Ref Range Status   02/28/2021 426 (H) 39 - 308 U/L Final     Troponin I   Date Value Ref Range Status   03/01/2021 30 86 (H) <=0 04 ng/mL Final     Comment:     Siemens Chemistry analyzer 99% cutoff is > 0 04 ng/mL in network labs     o cTnI 99% cutoff is useful only when applied to patients in the clinical setting of myocardial ischemia   o cTnI 99% cutoff should be interpreted in the context of clinical history, ECG findings and possibly cardiac imaging to establish correct diagnosis  o cTnI 99% cutoff may be suggestive but clearly not indicative of a coronary event without the clinical setting of myocardial ischemia      02/28/2021 31 55 (H) <=0 04 ng/mL Final     Comment:     Siemens Chemistry analyzer 99% cutoff is > 0 04 ng/mL in network labs     o cTnI 99% cutoff is useful only when applied to patients in the clinical setting of myocardial ischemia   o cTnI 99% cutoff should be interpreted in the context of clinical history, ECG findings and possibly cardiac imaging to establish correct diagnosis     o cTnI 99% cutoff may be suggestive but clearly not indicative of a coronary event without the clinical setting of myocardial ischemia      02/28/2021 33 98 (H) <=0 04 ng/mL Final     Comment:     Siemens Chemistry analyzer 99% cutoff is > 0 04 ng/mL in network labs     o cTnI 99% cutoff is useful only when applied to patients in the clinical setting of myocardial ischemia   o cTnI 99% cutoff should be interpreted in the context of clinical history, ECG findings and possibly cardiac imaging to establish correct diagnosis  o cTnI 99% cutoff may be suggestive but clearly not indicative of a coronary event without the clinical setting of myocardial ischemia  CK-MB Index   Date Value Ref Range Status   2021 7 3 (H) 0 0 - 2 5 % Final     Results for orders placed during the hospital encounter of 21   Echo complete with contrast if indicated    Narrative [address]  [facility], [address]  [phone]    Transthoracic Echocardiogram  2D, M-mode, Doppler, and Color Doppler    Study date:  01-Mar-2021    Patient: Harjinder Fung  MR number: [id number]  Account number: [de-identified]  : 1940  Age: 68 years  Gender: Male  Status: Inpatient  Location: Bedside  Height: 70 in  Weight: 178 6 lb  BP: 106/ 56 mmHg    Indications: Acute MI    Diagnoses: I21 4 - Non-ST elevation (NSTEMI) myocardial infarction    Sonographer:  Issac Merlin, RDCS  Primary Physician:  April Kimble MD  Referring Physician:  Megan Clement DO  Group:  [facility]  Interpreting Physician:  Stas Tan MD    IMPRESSIONS:  Technical quality: Fair but adequate  Slightly suboptimal parasternal windows  Cardiac rhythm: Sinus    1  Normal left ventricular cavity size, markedly reduced left ventricular systolic function with regional wall motion abnormality  Ejection fraction is estimated as around 24 percent  Grade 3 diastolic dysfunction with elevated left  ventricular filling pressures  2  Normal right ventricular size and systolic function  3  Mild left atrial and borderline right atrial cavity enlargement  4  Aortic valve sclerosis with some focal calcification, trace aortic valve regurgitation  5  Mitral annular calcification and mitral valve sclerosis with some focal calcification, mild mitral valve regurgitation  6  Trace tricuspid valve regurgitation  7  No obvious pulmonary hypertension  8  No pericardial effusion      No previous echocardiogram is available for comparison  SUMMARY    LEFT VENTRICLE:  Normal left ventricular cavity size, normal wall thickness, severely reduced left ventricular systolic function  There is akinesis of the basal to mid inferior wall, sinus and severe hypokinesis of the entire inferolateral wall and  inferoseptal wall  Ejection fraction is estimated as around 24 percent  Grade 3 diastolic dysfunction, restrictive left ventricular filling pattern  Estimated left ventricular filling pressure is increased  RIGHT VENTRICLE:  Normal right ventricular size and systolic function  Normal estimated right ventricular systolic pressure, 29 mmHg  LEFT ATRIUM:  Mild left atrial cavity enlargement  Intact interatrial septum  RIGHT ATRIUM:  Borderline right atrial cavity enlargement  MITRAL VALVE:  Mitral annular calcification and mitral valve leaflet sclerosis with some focal calcification on anterior mitral valve leaflet  Adequate leaflet mobility  There is mild mitral valve regurgitation  No mitral valve stenosis  AORTIC VALVE:  Tricuspid aortic valve with sclerosis and some focal calcification, adequate cuspal separation  No aortic valve stenosis  Trace aortic valve regurgitation noted  TRICUSPID VALVE:  Trace tricuspid valve regurgitation  PULMONIC VALVE:  No significant pulmonic valve regurgitation  AORTA:  Aortic root and proximal ascending aorta are normal in size on 2D imaging  IVC, HEPATIC VEINS:  Inferior vena cava is normal in size and demonstrates appropriate respiratory phasic changes in diameter  PERICARDIUM:  No pericardial effusion  SUMMARY MEASUREMENTS  2D measurements:  Unspecified Anatomy:   %FS was 10 3 %  Ao Diam was 3 cm  EDV(Teich) was 97 7 ml   EF Biplane was 22 6 %  EF(Teich) was 22 5 %  ESV(Teich) was 75 7 ml  IVSd was 1 1 cm  LA Diam was 4 3 cm  LAAs A4C was 21 6 cm2  LAESV A-L A4C was 66  ml  LAESV MOD A4C was 59 8 ml  LALs A4C was 6 cm  LVEDV MOD A2C was 109 ml  LVEDV MOD A4C was 91 ml  LVEDV MOD BP was 102 1 ml  LVEDVInd MOD BP was 51 3 ml/m2  LVEF MOD A2C was 24 2 %  LVEF MOD A4C was 17 9 %  LVESV MOD A2C was  82 7 ml  LVESV MOD A4C was 74 7 ml  LVESV MOD BP was 79 ml  LVESVInd MOD BP was 39 7 ml/m2  LVIDd was 4 6 cm  LVIDs was 4 1 cm  LVLd A2C was 8 5 cm  LVLd A4C was 8 1 cm  LVLs A2C was 8 cm  LVLs A4C was 7 9 cm  LVPWd was 1 cm  Billy A4C was 17 cm2  RAEDV A-L was 46 2 ml  RAEDV MOD was 45 6 ml  RALd was 5 3 cm  RVIDd was 3 4 cm  SV MOD A2C was 26 4 ml   SV MOD A4C was 16 3 ml   SV(Teich) was 22 ml   CW measurements:  Unspecified Anatomy:   AV Env  Ti was 279 7 ms   AV VTI was 15 7 cm  AV Vmax was 0 8 m/s  AV Vmean was 0 6 m/s  AV maxPG was 2 8 mmHg  AV meanPG was 1 5 mmHg  TR Vmax was 2 2 m/s   TR maxPG was 19 2 mmHg  MM measurements:  Unspecified Anatomy:   TAPSE was 1 7 cm  PW measurements:  Unspecified Anatomy:   DVI was 0 9   E' Sept was 0 m/s  E/E' Sept was 21 5   LVOT Env  Ti was 324 8 ms  LVOT VTI was 13 6 cm  LVOT Vmax was 0 6 m/s  LVOT Vmean was 0 4 m/s  LVOT maxPG was 1 4 mmHg  LVOT meanPG was 0 8 mmHg  MV A  Neel was 0 3 m/s  MV Dec [place] was 6 1 m/s2  MV DecT was 158 ms  MV E Neel was 1 m/s  MV E/A Ratio was 2 9   MV PHT was 45 8 ms  MVA By PHT was 4 8 cm2  HISTORY: PRIOR HISTORY: HLD, HTN, DM, NSTEMI    PROCEDURE: The procedure was performed at the bedside  This was a stat study  The transthoracic approach was used  The study included complete 2D imaging, M-mode, complete spectral Doppler, and color Doppler  The heart rate was 79 bpm, at  the start of the study  Images were obtained from the parasternal, apical, subcostal, and suprasternal notch acoustic windows  Image quality was adequate  LEFT VENTRICLE: Normal left ventricular cavity size, normal wall thickness, severely reduced left ventricular systolic function   There is akinesis of the basal to mid inferior wall, sinus and severe hypokinesis of the entire inferolateral  wall and inferoseptal wall  Ejection fraction is estimated as around 24 percent  Grade 3 diastolic dysfunction, restrictive left ventricular filling pattern  Estimated left ventricular filling pressure is increased  RIGHT VENTRICLE: Normal right ventricular size and systolic function  Normal estimated right ventricular systolic pressure, 29 mmHg  LEFT ATRIUM: Mild left atrial cavity enlargement  Intact interatrial septum  RIGHT ATRIUM: Borderline right atrial cavity enlargement  MITRAL VALVE: Mitral annular calcification and mitral valve leaflet sclerosis with some focal calcification on anterior mitral valve leaflet  Adequate leaflet mobility  There is mild mitral valve regurgitation  No mitral valve stenosis  AORTIC VALVE: Tricuspid aortic valve with sclerosis and some focal calcification, adequate cuspal separation  No aortic valve stenosis  Trace aortic valve regurgitation noted  TRICUSPID VALVE: Trace tricuspid valve regurgitation  PULMONIC VALVE: No significant pulmonic valve regurgitation  PERICARDIUM: No pericardial effusion  AORTA: Aortic root and proximal ascending aorta are normal in size on 2D imaging  SYSTEMIC VEINS: IVC: Inferior vena cava is normal in size and demonstrates appropriate respiratory phasic changes in diameter      SYSTEM MEASUREMENT TABLES    2D  %FS: 10 3 %  Ao Diam: 3 cm  EDV(Teich): 97 7 ml  EF Biplane: 22 6 %  EF(Teich): 22 5 %  ESV(Teich): 75 7 ml  IVSd: 1 1 cm  LA Diam: 4 3 cm  LAAs A4C: 21 6 cm2  LAESV A-L A4C: 66 ml  LAESV MOD A4C: 59 8 ml  LALs A4C: 6 cm  LVEDV MOD A2C: 109 ml  LVEDV MOD A4C: 91 ml  LVEDV MOD BP: 102 1 ml  LVEDVInd MOD BP: 51 3 ml/m2  LVEF MOD A2C: 24 2 %  LVEF MOD A4C: 17 9 %  LVESV MOD A2C: 82 7 ml  LVESV MOD A4C: 74 7 ml  LVESV MOD BP: 79 ml  LVESVInd MOD BP: 39 7 ml/m2  LVIDd: 4 6 cm  LVIDs: 4 1 cm  LVLd A2C: 8 5 cm  LVLd A4C: 8 1 cm  LVLs A2C: 8 cm  LVLs A4C: 7 9 cm  LVPWd: 1 cm  Billy A4C: 17 cm2  RAEDV A-L: 46 2 ml  RAEDV MOD: 45 6 ml  RALd: 5 3 cm  RVIDd: 3 4 cm  SV MOD A2C: 26 4 ml  SV MOD A4C: 16 3 ml  SV(Teich): 22 ml    CW  AV Env  Ti: 279 7 ms  AV VTI: 15 7 cm  AV Vmax: 0 8 m/s  AV Vmean: 0 6 m/s  AV maxP 8 mmHg  AV meanP 5 mmHg  TR Vmax: 2 2 m/s  TR maxP 2 mmHg    MM  TAPSE: 1 7 cm    PW  DVI: 0 9  E' Sept: 0 m/s  E/E' Sept: 21 5  LVOT Env  Ti: 324 8 ms  LVOT VTI: 13 6 cm  LVOT Vmax: 0 6 m/s  LVOT Vmean: 0 4 m/s  LVOT maxP 4 mmHg  LVOT meanP 8 mmHg  MV A Neel: 0 3 m/s  MV Dec [place]: 6 1 m/s2  MV DecT: 158 ms  MV E Neel: 1 m/s  MV E/A Ratio: 2 9  MV PHT: 45 8 ms  MVA By PHT: 4 8 cm2    [facility]etal Commission Accredited Echocardiography Laboratory    Prepared and electronically signed by    Odilon Sheffield MD  Signed 01-Mar-2021 10:45:22       No results found for this or any previous visit  This note was completed in part utilizing m-modal fluency direct voice recognition software  Grammatical errors, random word insertion, spelling mistakes, and incomplete sentences may be an occasional consequence of the system secondary to software limitations, ambient noise and hardware issues  At the time of dictation, efforts were made to edit, clarify and /or correct errors  Please read the chart carefully and recognize, using context, where substitutions have occurred    If you have any questions or concerns about the context, text or information contained within the body of this dictation, please contact myself, the provider, for further clarification

## 2021-04-06 NOTE — PATIENT INSTRUCTIONS
DASH Eating Plan   WHAT YOU NEED TO KNOW:   The DASH (Dietary Approaches to Stop Hypertension) Eating Plan is designed to help prevent or lower high blood pressure  It can also help to lower LDL (bad) cholesterol and decrease your risk of heart disease  The plan is low in sodium, sugar, unhealthy fats, and total fat  It is high in potassium, calcium, magnesium, and fiber  These nutrients are added when you eat more fruits, vegetables, and whole grains  DISCHARGE INSTRUCTIONS:   Your sodium limit each day: Your dietitian will tell you how much sodium is safe for you to have each day  People with high blood pressure should have no more than 1,500 to 2,300 mg of sodium in a day  A teaspoon (tsp) of salt has 2,300 mg of sodium  This may seem like a difficult goal, but small changes to the foods you eat can make a big difference  Your healthcare provider or dietitian can help you create a meal plan that follows your sodium limit  How to limit sodium:   · Read food labels  Food labels can help you choose foods that are low in sodium  The amount of sodium is listed in milligrams (mg)  The % Daily Value (DV) column tells you how much of your daily needs are met by 1 serving of the food for each nutrient listed  Choose foods that have less than 5% of the DV of sodium  These foods are considered low in sodium  Foods that have 20% or more of the DV of sodium are considered high in sodium  Avoid foods that have more than 300 mg of sodium in each serving  Choose foods that say low-sodium, reduced-sodium, or no salt added on the food label  · Avoid salt  Do not salt food at the table, and add very little salt to foods during cooking  Use herbs and spices, such as onions, garlic, and salt-free seasonings to add flavor to foods  Try lemon or lime juice or vinegar to give foods a tart flavor  Use hot peppers or a small amount of hot pepper sauce to add a spicy flavor to foods  · Ask about salt substitutes    Ask your healthcare provider if you may use salt substitutes  Some salt substitutes have ingredients that can be harmful if you have certain health conditions  · Choose foods carefully at restaurants  Meals from restaurants, especially fast food restaurants, are often high in sodium  Some restaurants have nutrition information that tells you the amount of sodium in their foods  Ask to have your food prepared with less, or no salt  What you need to know about fats:   · Include healthy fats  Examples are unsaturated fats and omega-3 fatty acids  Unsaturated fats are found in soybean, canola, olive, or sunflower oil, and liquid and soft tub margarines  Omega-3 fatty acids are found in fatty fish, such as salmon, tuna, mackerel, and sardines  It is also found in flaxseed oil and ground flaxseed  · Avoid unhealthy fats  Do not eat unhealthy fats, such as saturated fats and trans fats  Saturated fats are found in foods that contain fat from animals  Examples are fatty meats, whole milk, butter, cream, and other dairy foods  It is also found in shortening, stick margarine, palm oil, and coconut oil  Trans fats are found in fried foods, crackers, chips, and baked goods made with margarine or shortening  Foods to include: With the DASH eating plan, you need to eat a certain number of servings from each food group  This will help you get enough of certain nutrients and limit others  The amount of servings you should eat depends on how many calories you need  Your dietitian can tell you how many calories you need  The number of servings listed next to the food groups below are for people who need about 2,000 calories each day  · Grains:  6 to 8 servings (3 of these servings should be whole-grain foods)    ? 1 slice of whole-grain bread     ? 1 ounce of dry cereal    ? ½ cup of cooked cereal, pasta, or brown rice    · Vegetables and fruits:  4 to 5 servings of fruits and 4 to 5 servings of vegetables    ?  1 medium fruit    ? ½ cup of frozen, canned (no added salt), or chopped fresh vegetables     ? ½ cup of fresh, frozen, dried, or canned fruit (canned in light syrup or fruit juice)    ? ½ cup of vegetable or fruit juice    · Dairy:  2 to 3 servings    ? 1 cup of nonfat (skim) or 1% milk    ? 1½ ounces of fat-free or low-fat cheese    ? 6 ounces of nonfat or low-fat yogurt    · Lean meat, poultry, and fish:  6 ounces or less    ? Poultry (chicken, turkey) with no skin    ? Fish (especially fatty fish, such as salmon, fresh tuna, or mackerel)    ? Lean beef and pork (loin, round, extra lean hamburger)    ? Egg whites and egg substitutes    · Nuts, seeds, and legumes:  4 to 5 servings each week    ? ½ cup of cooked beans and peas    ? 1½ ounces of unsalted nuts    ? 2 tablespoons of peanut butter or seeds    · Sweets and added sugars:  5 or less each week    ? 1 tablespoon of sugar, jelly, or jam    ? ½ cup of sorbet or gelatin    ? 1 cup of lemonade    · Fats:  2 to 3 servings each week    ? 1 teaspoon of soft margarine or vegetable oil    ? 1 tablespoon of mayonnaise    ? 2 tablespoons of salad dressing    Foods to avoid:   · Grains:      ? Baked goods, such as doughnuts, pastries, cookies, and biscuits (high in fat and sugar)    ? Mixes for cornbread and biscuits, packaged foods, such as bread stuffing, rice and pasta mixes, macaroni and cheese, and instant cereals (high in sodium)    · Fruits and vegetables:      ? Regular, canned vegetables (high in sodium)    ? Sauerkraut, pickled vegetables, and other foods prepared in brine (high in sodium)    ? Fried vegetables or vegetables in butter or high-fat sauces    ? Fruit in cream or butter sauce (high in fat)    · Dairy:      ? Whole milk, 2% milk, and cream (high in fat)    ?  Regular cheese and processed cheese (high in fat and sodium)    · Meats and protein foods:      ? Smoked or cured meat, such as corned beef, hgoan, ham, hot dogs, and sausage (high in fat and sodium)    ? Canned beans and canned meats or spreads, such as potted meats, sardines, anchovies, and imitation seafood (high in sodium)    ? Deli or lunch meats, such as bologna, ham, turkey, and roast beef (high in sodium)    ? High-fat meat (T-bone steak, regular hamburger, and ribs)    ? Whole eggs and egg yolks (high in fat)    · Other:      ? Seasonings made with salt, such as garlic salt, celery salt, onion salt, seasoned salt, meat tenderizers, and monosodium glutamate (MSG)    ? Miso soup and canned or dried soup mixes (high in sodium)    ? Regular soy sauce, barbecue sauce, teriyaki sauce, steak sauce, Worcestershire sauce, and most flavored vinegars (high in sodium)    ? Regular condiments, such as mustard, ketchup, and salad dressings (high in sodium)    ? Gravy and sauces, such as Chuy or cheese sauces (high in sodium and fat)    ? Drinks high in sugar, such as soda or fruit drinks    ? Snack foods, such as salted chips, popcorn, pretzels, pork rinds, salted crackers, and salted nuts    ? Frozen foods, such as dinners, entrees, vegetables with sauces, and breaded meats (high in sodium)    Other guidelines to follow:   · Maintain a healthy weight  Your risk for heart disease is higher if you are overweight  Your healthcare provider may suggest that you lose weight if you are overweight  You can lose weight by eating fewer calories and foods that have added sugars and fat  The DASH meal plan can help you do this  Decrease calories by eating smaller portions at each meal and fewer snacks  Ask your healthcare provider for more information about how to lose weight  · Exercise regularly  Regular exercise can help you reach or maintain a healthy weight  Regular exercise can also help decrease your blood pressure and improve your cholesterol levels  Get 30 minutes or more of moderate exercise each day of the week  To lose weight, get at least 60 minutes of exercise   Talk to your healthcare provider about the best exercise program for you  · Limit alcohol  Women should limit alcohol to 1 drink a day  Men should limit alcohol to 2 drinks a day  A drink of alcohol is 12 ounces of beer, 5 ounces of wine, or 1½ ounces of liquor  © Copyright [address] Information is for End User's use only and may not be sold, redistributed or otherwise used for commercial purposes  All illustrations and images included in CareNotes® are the copyrighted property of A D A M , Inc  or [facility] Ana Laura Morse   The above information is an  only  It is not intended as medical advice for individual conditions or treatments  Talk to your doctor, nurse or pharmacist before following any medical regimen to see if it is safe and effective for you

## 2021-04-06 NOTE — TELEPHONE ENCOUNTER
Pt called - states that after he got home he realized he does not have Brilinta at home and may not have had it since his d/c from [facility] [place] [facility]x around 3/17  Advised pt that rx'es including Brilinta were renewed today at his visit and are available at [facility]  Requested pt have his family member p/u his medications today and he should restart taking his Brilinta immediately  Also reminded pt not to stop any of his medications including Brilinta and ASA without speaking to physician first     Pt verbalized understanding

## 2021-04-08 ENCOUNTER — LAB (OUTPATIENT)
Dept: LAB | Facility: HOSPITAL | Age: 81
End: 2021-04-08
Payer: COMMERCIAL

## 2021-04-08 DIAGNOSIS — I25.5 ISCHEMIC CARDIOMYOPATHY: ICD-10-CM

## 2021-04-08 LAB
ANION GAP SERPL CALCULATED.3IONS-SCNC: 6 MMOL/L (ref 4–13)
BUN SERPL-MCNC: 14 MG/DL (ref 5–25)
CALCIUM SERPL-MCNC: 9.3 MG/DL (ref 8.3–10.1)
CHLORIDE SERPL-SCNC: 106 MMOL/L (ref 100–108)
CO2 SERPL-SCNC: 28 MMOL/L (ref 21–32)
CREAT SERPL-MCNC: 0.95 MG/DL (ref 0.6–1.3)
ERYTHROCYTE [DISTWIDTH] IN BLOOD BY AUTOMATED COUNT: 14.6 % (ref 11.6–15.1)
GFR SERPL CREATININE-BSD FRML MDRD: 76 ML/MIN/1.73SQ M
GLUCOSE P FAST SERPL-MCNC: 79 MG/DL (ref 65–99)
HCT VFR BLD AUTO: 41.5 % (ref 36.5–49.3)
HGB BLD-MCNC: 13.3 G/DL (ref 12–17)
MCH RBC QN AUTO: 30.6 PG (ref 26.8–34.3)
MCHC RBC AUTO-ENTMCNC: 32 G/DL (ref 31.4–37.4)
MCV RBC AUTO: 96 FL (ref 82–98)
NT-PROBNP SERPL-MCNC: 4741 PG/ML
PLATELET # BLD AUTO: 225 THOUSANDS/UL (ref 149–390)
PMV BLD AUTO: 10.8 FL (ref 8.9–12.7)
POTASSIUM SERPL-SCNC: 4.1 MMOL/L (ref 3.5–5.3)
RBC # BLD AUTO: 4.34 MILLION/UL (ref 3.88–5.62)
SODIUM SERPL-SCNC: 140 MMOL/L (ref 136–145)
WBC # BLD AUTO: 5.82 THOUSAND/UL (ref 4.31–10.16)

## 2021-04-08 PROCEDURE — 83880 ASSAY OF NATRIURETIC PEPTIDE: CPT

## 2021-04-08 PROCEDURE — 85027 COMPLETE CBC AUTOMATED: CPT

## 2021-04-08 PROCEDURE — 36415 COLL VENOUS BLD VENIPUNCTURE: CPT

## 2021-04-08 PROCEDURE — 80048 BASIC METABOLIC PNL TOTAL CA: CPT

## 2021-04-09 ENCOUNTER — TELEPHONE (OUTPATIENT)
Dept: CARDIOLOGY CLINIC | Facility: CLINIC | Age: 81
End: 2021-04-09

## 2021-04-09 DIAGNOSIS — I50.41 ACUTE COMBINED SYSTOLIC AND DIASTOLIC HF (HEART FAILURE) (HCC): Primary | ICD-10-CM

## 2021-04-09 RX ORDER — FUROSEMIDE 20 MG/1
20 TABLET ORAL 2 TIMES DAILY
Qty: 30 TABLET | Refills: 3 | Status: SHIPPED | OUTPATIENT
Start: 2021-04-09

## 2021-04-09 NOTE — TELEPHONE ENCOUNTER
BNP elevated  Advise to take Lasix 20 mg/d, adhere to a low Na diet   Repat BMP 1-2 weeks   Lasix and labs written for

## 2021-04-09 NOTE — TELEPHONE ENCOUNTER
Called - LM on pt's VM that medication will be at [facility] for him and asked him to take as directed  Will call pt again on Monday to f/u with rest of message

## 2021-04-13 NOTE — TELEPHONE ENCOUNTER
Still unable to reach pt - pt has not returned call  Called [facility], verified that pt did p/u Paul  Called [facility], verified that pt did p/u Lasix  Reprinted lab slips, mailed to pt w/ note to have done in about a week

## 2021-04-23 ENCOUNTER — LAB (OUTPATIENT)
Dept: LAB | Facility: HOSPITAL | Age: 81
End: 2021-04-23
Payer: COMMERCIAL

## 2021-04-23 DIAGNOSIS — I50.41 ACUTE COMBINED SYSTOLIC AND DIASTOLIC HF (HEART FAILURE) (HCC): ICD-10-CM

## 2021-04-23 LAB
ANION GAP SERPL CALCULATED.3IONS-SCNC: 7 MMOL/L (ref 4–13)
BUN SERPL-MCNC: 14 MG/DL (ref 5–25)
CALCIUM SERPL-MCNC: 9 MG/DL (ref 8.3–10.1)
CHLORIDE SERPL-SCNC: 100 MMOL/L (ref 100–108)
CO2 SERPL-SCNC: 27 MMOL/L (ref 21–32)
CREAT SERPL-MCNC: 1.01 MG/DL (ref 0.6–1.3)
GFR SERPL CREATININE-BSD FRML MDRD: 71 ML/MIN/1.73SQ M
GLUCOSE P FAST SERPL-MCNC: 247 MG/DL (ref 65–99)
POTASSIUM SERPL-SCNC: 4.8 MMOL/L (ref 3.5–5.3)
SODIUM SERPL-SCNC: 134 MMOL/L (ref 136–145)

## 2021-04-23 PROCEDURE — 80048 BASIC METABOLIC PNL TOTAL CA: CPT

## 2021-04-23 PROCEDURE — 36415 COLL VENOUS BLD VENIPUNCTURE: CPT

## 2021-05-06 ENCOUNTER — OFFICE VISIT (OUTPATIENT)
Dept: CARDIOLOGY CLINIC | Facility: CLINIC | Age: 81
End: 2021-05-06
Payer: COMMERCIAL

## 2021-05-06 VITALS
HEIGHT: 70 IN | DIASTOLIC BLOOD PRESSURE: 70 MMHG | HEART RATE: 77 BPM | SYSTOLIC BLOOD PRESSURE: 144 MMHG | WEIGHT: 185 LBS | BODY MASS INDEX: 26.48 KG/M2

## 2021-05-06 DIAGNOSIS — I21.4 NSTEMI (NON-ST ELEVATED MYOCARDIAL INFARCTION) (HCC): ICD-10-CM

## 2021-05-06 DIAGNOSIS — I50.22 CHRONIC SYSTOLIC (CONGESTIVE) HEART FAILURE (HCC): Primary | ICD-10-CM

## 2021-05-06 DIAGNOSIS — I25.10 CORONARY ARTERY DISEASE INVOLVING NATIVE CORONARY ARTERY OF NATIVE HEART WITHOUT ANGINA PECTORIS: ICD-10-CM

## 2021-05-06 PROCEDURE — 99214 OFFICE O/P EST MOD 30 MIN: CPT | Performed by: INTERNAL MEDICINE

## 2021-05-06 RX ORDER — CLOPIDOGREL BISULFATE 75 MG/1
300 TABLET ORAL ONCE
Qty: 4 TABLET | Refills: 0 | Status: SHIPPED | OUTPATIENT
Start: 2021-05-06 | End: 2021-08-06

## 2021-05-06 RX ORDER — CLOPIDOGREL BISULFATE 75 MG/1
75 TABLET ORAL DAILY
Qty: 90 TABLET | Refills: 5 | Status: SHIPPED | OUTPATIENT
Start: 2021-05-06

## 2021-05-06 RX ORDER — LISINOPRIL 20 MG/1
20 TABLET ORAL DAILY
Qty: 30 TABLET | Refills: 3 | Status: SHIPPED | OUTPATIENT
Start: 2021-05-06

## 2021-05-06 RX ORDER — METOPROLOL SUCCINATE 25 MG/1
25 TABLET, EXTENDED RELEASE ORAL DAILY
Qty: 30 TABLET | Refills: 5 | Status: SHIPPED | OUTPATIENT
Start: 2021-05-06 | End: 2021-08-06 | Stop reason: SDUPTHER

## 2021-05-06 NOTE — PROGRESS NOTES
Cardiology   Shaune Frankel, MD Kristeen Ludwig, MD Ather Mansoor, MD Almetta Curio, DO, Nathaniel Villegas DO, [facility] [place]  Chitra Rosario DO, [facility] [place]  -------------------------------------------------------------------  Formerly [facility], [facility], [address] [place] [address] [id number] [phone]                        Zahida Ink                     1940                     [id number]          Assessment/Plan:    1  CAD, subacute inferior myocardial infarction 02/2021 with multivessel CAD noted (100% stenosis of the circumflex and RCA, severe LAD stenosis status post PCI/MOSHE to the proximal/mid LAD 03/01/2021)  2  Ischemic cardiomyopathy, ejection fraction 24%   3  Chronic systolic and diastolic congestive heart failure   4  Hypertension  5  Dyslipidemia    · No symptoms of angina, continue aspirin  He discontinue Brilinta on his own due to cost   Will load clopidogrel 300 mg and subsequently continue 75 mg once daily  These instructions were given to him in detail and he is agreeable  · Start lisinopril 20 mg p o  daily  Check BMP in 3 weeks before follow-up in 4 weeks   · Continue furosemide 20 mg p o  b i d  He will limit by examination   · Ejection fraction 24%  Will recheck echocardiogram after ACE-inhibitor and spironolactone are initiated  Medical management has not yet been maximized  · Blood pressure elevated today, hopefully lisinopril will help   · Continue atorvastatin      Follow up in 1 month after BMP      Interval History: This is a very pleasant 72-year-old male who presented to [address] [place] [address] 02/28/2021 with subacute myocardial infarction  Coronary angiography 03/01/2021 revealed a chronic total occlusion of the mid circumflex, a 100% stenosis of the distal RCA, thought to be the likely culprit of the patient's recent myocardial infarction, and 90% proximal and mid LAD stenosis    It was thought that he had poor distal vessel targets which were extremely small  Therefore he underwent PCI/MOSHE to the proximal/mid LAD with a 3 0 x 15 mm stent  Echocardiogram 03/01/2021 reported ejection fraction 24% with grade 3 diastolic dysfunction  He was discharged on aspirin and Brilinta as well as furosemide 20 mg twice daily  He was fitted with a life vest before discharge  He presents today to establish cardiovascular care with me  From a symptomatic standpoint he feels well without exertional chest pain, shortness of breath, dizziness, palpitations, lower extremity edema  He states he stopped taking Brilinta because it was too expensive for him  He has been compliant with aspirin  He states he discontinued his life vest on his own without contacting our group, and expressed disinterest in wearing it any further               Vitals:  Vitals:    05/06/21 1256   BP: 144/70   BP Location: Right arm   Patient Position: Sitting   Cuff Size: Standard   Pulse: 77   Weight: 83 9 kg (185 lb)   Height: 5' 10" (1 778 m)         Past Medical History:   Diagnosis Date    Arthritis     Diabetes mellitus ([facility] )     Hyperlipidemia     NSTEMI (non-ST elevated myocardial infarction) ([facility] )     Osteoporosis      Social History     Socioeconomic History    Marital status: Single     Spouse name: Not on file    Number of children: Not on file    Years of education: Not on file    Highest education level: Not on file   Occupational History    Not on file   Social Needs    Financial resource strain: Not on file    Food insecurity     Worry: Not on file     Inability: Not on file    Transportation needs     Medical: Not on file     Non-medical: Not on file   Tobacco Use    Smoking status: Current Every Day Smoker     Packs/day: 0 00     Types: Pipe     Start date: 3/5/2011    Smokeless tobacco: Never Used    Tobacco comment: Patient states smokes 3 pipes/day   Substance and Sexual Activity    Alcohol use: Yes     Frequency: 4 or more times a week     Drinks per session: 1 or 2     Binge frequency: Never     Comment: pt states he usually has a small glass of wine most evenings    Drug use: No    Sexual activity: Not Currently   Lifestyle    Physical activity     Days per week: Not on file     Minutes per session: Not on file    Stress: Not on file   Relationships    Social connections     Talks on phone: Not on file     Gets together: Not on file     Attends [employer] service: Not on file     Active member of club or organization: Not on file     Attends meetings of clubs or organizations: Not on file     Relationship status: Not on file    Intimate partner violence     Fear of current or ex partner: Not on file     Emotionally abused: Not on file     Physically abused: Not on file     Forced sexual activity: Not on file   Other Topics Concern    Not on file   Social History Narrative    Not on file      Family History   Problem Relation Age of Onset    No Known Problems Mother     Arthritis Father     Diabetes unspecified Brother      Past Surgical History:   Procedure Laterality Date    TOTAL HIP ARTHROPLASTY         Current Outpatient Medications:     alendronate (FOSAMAX) 70 mg tablet, once a week Takes on saturdays, Disp: , Rfl:     aspirin 81 MG tablet, Take 81 mg by mouth daily , Disp: , Rfl:     atorvastatin (LIPITOR) 40 mg tablet, Take 1 tablet (40 mg total) by mouth daily, Disp: 30 tablet, Rfl: 3    cyanocobalamin (VITAMIN B-12) 1,000 mcg tablet, Take 1,000 mcg by mouth daily , Disp: , Rfl:     ergocalciferol (VITAMIN D2) 50,000 units, Take 50,000 Units by mouth once a week wednesdays, Disp: , Rfl: 0    furosemide (LASIX) 20 mg tablet, Take 1 tablet (20 mg total) by mouth 2 (two) times a day, Disp: 30 tablet, Rfl: 3    glipiZIDE (GLIPIZIDE XL) 10 mg 24 hr tablet, Take 10 mg by mouth daily , Disp: , Rfl:     insulin NPH-insulin regular (NOVOLIN 70/30 RELION) 100 units/mL subcutaneous injection, Inject 20 Units under the skin 2 (two) times a day , Disp: , Rfl:     ketorolac (ACULAR) 0 5 % ophthalmic solution, instill 1 drop into right eye twice a day, Disp: , Rfl:     metoprolol succinate (TOPROL-XL) 25 mg 24 hr tablet, Take 1 tablet (25 mg total) by mouth daily, Disp: 30 tablet, Rfl: 5    pioglitazone (ACTOS) 45 mg tablet, Take 45 mg by mouth daily , Disp: , Rfl:     clopidogrel (PLAVIX) 75 mg tablet, Take 4 tablets (300 mg total) by mouth once for 1 dose, Disp: 4 tablet, Rfl: 0    clopidogrel (PLAVIX) 75 mg tablet, Take 1 tablet (75 mg total) by mouth daily, Disp: 90 tablet, Rfl: 5    lisinopril (ZESTRIL) 20 mg tablet, Take 1 tablet (20 mg total) by mouth daily, Disp: 30 tablet, Rfl: 3    nitroglycerin (NITROSTAT) 0 4 mg SL tablet, Place 1 tablet (0 4 mg total) under the tongue every 5 (five) minutes as needed for chest pain (Patient not taking: Reported on 5/6/2021), Disp: 21 tablet, Rfl: 0        Review of Systems:  Review of Systems   Respiratory: Negative  Cardiovascular: Negative  All other systems reviewed and are negative  Physical Exam:  Physical Exam  Constitutional:       General: He is not in acute distress  Appearance: He is well-developed  He is not diaphoretic  HENT:      Head: Normocephalic and atraumatic  Eyes:      General: No scleral icterus  Right eye: No discharge  Pupils: Pupils are equal, round, and reactive to light  Neck:      Musculoskeletal: Normal range of motion and neck supple  Thyroid: No thyromegaly  Cardiovascular:      Rate and Rhythm: Normal rate and regular rhythm  Heart sounds: Normal heart sounds  No murmur  No friction rub  No gallop  Pulmonary:      Effort: Pulmonary effort is normal       Breath sounds: Normal breath sounds  Abdominal:      General: There is no distension  Tenderness: There is no abdominal tenderness  There is no guarding or rebound  Musculoskeletal: Normal range of motion     Skin:     General: Skin is warm and dry  Coloration: Skin is not pale  Findings: No erythema or rash  Neurological:      Mental Status: He is alert and oriented to person, place, and time  Coordination: Coordination normal    Psychiatric:         Behavior: Behavior normal          Thought Content: Thought content normal          Judgment: Judgment normal          This note was completed in part utilizing [employer] Fluency Direct Software  Grammatical errors, random word insertions, spelling mistakes, and incomplete sentences can be an occasional consequence of this system secondary to software limitations, ambient noise, and hardware issues  If you have any questions or concerns about the content, text, or information contained within the body of this dictation, please contact the provider for clarification

## 2021-05-25 LAB — HBA1C MFR BLD HPLC: 7.7 %

## 2021-06-03 ENCOUNTER — OFFICE VISIT (OUTPATIENT)
Dept: CARDIOLOGY CLINIC | Facility: CLINIC | Age: 81
End: 2021-06-03
Payer: COMMERCIAL

## 2021-06-03 VITALS
SYSTOLIC BLOOD PRESSURE: 140 MMHG | DIASTOLIC BLOOD PRESSURE: 76 MMHG | HEART RATE: 90 BPM | WEIGHT: 186 LBS | BODY MASS INDEX: 26.63 KG/M2 | HEIGHT: 70 IN

## 2021-06-03 DIAGNOSIS — I25.10 CORONARY ARTERY DISEASE INVOLVING NATIVE CORONARY ARTERY OF NATIVE HEART WITHOUT ANGINA PECTORIS: ICD-10-CM

## 2021-06-03 DIAGNOSIS — I10 BENIGN ESSENTIAL HTN: ICD-10-CM

## 2021-06-03 DIAGNOSIS — E78.5 DYSLIPIDEMIA: ICD-10-CM

## 2021-06-03 DIAGNOSIS — I25.5 ISCHEMIC CARDIOMYOPATHY: Primary | ICD-10-CM

## 2021-06-03 PROCEDURE — 99214 OFFICE O/P EST MOD 30 MIN: CPT | Performed by: INTERNAL MEDICINE

## 2021-06-03 RX ORDER — SPIRONOLACTONE 25 MG/1
12.5 TABLET ORAL DAILY
Qty: 45 TABLET | Refills: 5 | Status: SHIPPED | OUTPATIENT
Start: 2021-06-03 | End: 2021-08-06 | Stop reason: SDUPTHER

## 2021-06-03 NOTE — PROGRESS NOTES
Cardiology   MD Armando Clay MD Ather Mansoor, MD Tharon Bring, DO, Atul Driver DO, Nico Oconnor DO, [facility] [place]  -------------------------------------------------------------------  Formerly [facility]  [address] [place] [id number] [phone]                        [facility]                     1940                     [id number]          Assessment/Plan:     1  CAD, subacute inferior myocardial infarction 02/2021 with multivessel CAD noted (100% stenosis of the circumflex and RCA, severe LAD stenosis status post PCI/MOSHE to the proximal/mid LAD 03/01/2021)  2  Ischemic cardiomyopathy, ejection fraction 24%   3  Chronic systolic and diastolic congestive heart failure   4  Hypertension  5  Dyslipidemia     · No symptoms of angina, continue aspirin  continue dual anti-platelet therapy with aspirin clopidogrel   · Continue lisinopril 20 mg daily  Start spironolactone 12 5 mg daily   BMP in 2-3 weeks   · Ejection fraction 24%  Will recheck echocardiogram after next visit, once medications optimized  · Continue atorvastatin  Check lipid panel before next visit        Follow up in 1 month after BMP, lipid panel  Will check echocardiogram after next visit to re-evaluate left ventricular systolic function and consider ICD placement if persistent severe cardiomyopathy present        Interval History:      This is a very pleasant 61-year-old male who presented to [address] [place] [address] 02/28/2021 with subacute myocardial infarction  Coronary angiography 03/01/2021 revealed a chronic total occlusion of the mid circumflex, a 100% stenosis of the distal RCA, thought to be the likely culprit of the patient's recent myocardial infarction, and 90% proximal and mid LAD stenosis  It was thought that he had poor distal vessel targets which were extremely small    Therefore he underwent PCI/MOSHE to the proximal/mid LAD with a 3 0 x 15 mm stent  Echocardiogram 03/01/2021 reported ejection fraction 24% with grade 3 diastolic dysfunction  He was discharged on aspirin and Brilinta as well as furosemide 20 mg twice daily  He was fitted with a life vest before discharge  During his initial consultation 05/2021, lisinopril was added to his regimen  His continued on furosemide 20 mg b i d  Clopidogrel was initiated as he discontinued Brilinta on his own  He refused life vest therapy  He presents today for follow-up with no complaints  He feels well without exertional chest pain, shortness of breath, dizziness, palpitations, lower extremity edema  He denies any bleeding or bruising on dual antiplatelets               Vitals:  Vitals:    06/03/21 1434   BP: 140/76   BP Location: Left arm   Patient Position: Sitting   Cuff Size: Adult   Pulse: 90   Weight: 84 4 kg (186 lb)   Height: 5' 10" (1 778 m)         Past Medical History:   Diagnosis Date    Arthritis     [facility] mellitus ([facility] )     Hyperlipidemia     NSTEMI (non-ST elevated myocardial infarction) ([place] [address] )     Osteoporosis      Social History     Socioeconomic History    Marital status: Single     Spouse name: Not on file    Number of children: Not on file    Years of education: Not on file    Highest education level: Not on file   Occupational History    Not on file   Social Needs    Financial resource strain: Not on file    Food insecurity     Worry: Not on file     Inability: Not on file    Transportation needs     Medical: Not on file     Non-medical: Not on file   Tobacco Use    Smoking status: Current Every Day Smoker     Packs/day: 0 00     Types: Pipe     Start date: 3/5/2011    Smokeless tobacco: Never Used    Tobacco comment: Patient states smokes 3 pipes/day   Substance and Sexual Activity    Alcohol use: Yes     Frequency: 4 or more times a week     Drinks per session: 1 or 2     Binge frequency: Never     Comment: pt states he usually has a small glass of wine most evenings    Drug use: No    Sexual activity: Not Currently   Lifestyle    Physical activity     Days per week: Not on file     Minutes per session: Not on file    Stress: Not on file   Relationships    Social connections     Talks on phone: Not on file     Gets together: Not on file     Attends Latter-day service: Not on file     Active member of club or organization: Not on file     Attends meetings of clubs or organizations: Not on file     Relationship status: Not on file    Intimate partner violence     Fear of current or ex partner: Not on file     Emotionally abused: Not on file     Physically abused: Not on file     Forced sexual activity: Not on file   Other Topics Concern    Not on file   Social History Narrative    Not on file      Family History   Problem Relation Age of Onset    No Known Problems Mother     Arthritis Father     Diabetes unspecified Brother      Past Surgical History:   Procedure Laterality Date    TOTAL HIP ARTHROPLASTY         Current Outpatient Medications:     alendronate (FOSAMAX) 70 mg tablet, once a week Takes on saturdays, Disp: , Rfl:     aspirin 81 MG tablet, Take 81 mg by mouth daily , Disp: , Rfl:     atorvastatin (LIPITOR) 40 mg tablet, Take 1 tablet (40 mg total) by mouth daily, Disp: 30 tablet, Rfl: 3    clopidogrel (PLAVIX) 75 mg tablet, Take 4 tablets (300 mg total) by mouth once for 1 dose, Disp: 4 tablet, Rfl: 0    cyanocobalamin (VITAMIN B-12) 1,000 mcg tablet, Take 1,000 mcg by mouth daily , Disp: , Rfl:     ergocalciferol (VITAMIN D2) 50,000 units, Take 50,000 Units by mouth once a week wednesdays, Disp: , Rfl: 0    glipiZIDE (GLIPIZIDE XL) 10 mg 24 hr tablet, Take 10 mg by mouth daily , Disp: , Rfl:     insulin NPH-insulin regular (NOVOLIN 70/30 RELION) 100 units/mL subcutaneous injection, Inject 20 Units under the skin 2 (two) times a day , Disp: , Rfl:     ketorolac (ACULAR) 0 5 % ophthalmic solution, instill 1 drop into right eye twice a day, Disp: , Rfl:     lisinopril (ZESTRIL) 20 mg tablet, Take 1 tablet (20 mg total) by mouth daily, Disp: 30 tablet, Rfl: 3    metoprolol succinate (TOPROL-XL) 25 mg 24 hr tablet, Take 1 tablet (25 mg total) by mouth daily, Disp: 30 tablet, Rfl: 5    nitroglycerin (NITROSTAT) 0 4 mg SL tablet, Place 1 tablet (0 4 mg total) under the tongue every 5 (five) minutes as needed for chest pain, Disp: 21 tablet, Rfl: 0    pioglitazone (ACTOS) 45 mg tablet, Take 45 mg by mouth daily , Disp: , Rfl:     clopidogrel (PLAVIX) 75 mg tablet, Take 1 tablet (75 mg total) by mouth daily (Patient not taking: Reported on 6/3/2021), Disp: 90 tablet, Rfl: 5    furosemide (LASIX) 20 mg tablet, Take 1 tablet (20 mg total) by mouth 2 (two) times a day (Patient not taking: Reported on 6/3/2021), Disp: 30 tablet, Rfl: 3    spironolactone (ALDACTONE) 25 mg tablet, Take 0 5 tablets (12 5 mg total) by mouth daily, Disp: 45 tablet, Rfl: 5        Review of Systems:  Review of Systems   Respiratory: Negative  Cardiovascular: Negative  All other systems reviewed and are negative  Physical Exam:  Physical Exam  Constitutional:       General: He is not in acute distress  Appearance: He is well-developed  He is not diaphoretic  HENT:      Head: Normocephalic and atraumatic  Eyes:      General: No scleral icterus  Right eye: No discharge  Pupils: Pupils are equal, round, and reactive to light  Neck:      Musculoskeletal: Normal range of motion and neck supple  Thyroid: No thyromegaly  Cardiovascular:      Rate and Rhythm: Normal rate and regular rhythm  Heart sounds: Normal heart sounds  No murmur  No friction rub  No gallop  Pulmonary:      Effort: Pulmonary effort is normal       Breath sounds: Normal breath sounds  Abdominal:      General: There is no distension  Tenderness: There is no abdominal tenderness  There is no guarding or rebound  Musculoskeletal: Normal range of motion  Skin:     General: Skin is warm and dry  Coloration: Skin is not pale  Findings: No erythema or rash  Neurological:      Mental Status: He is alert and oriented to person, place, and time  Coordination: Coordination normal    Psychiatric:         Behavior: Behavior normal          Thought Content: Thought content normal          Judgment: Judgment normal          This note was completed in part utilizing [employer] Direct Software  Grammatical errors, random word insertions, spelling mistakes, and incomplete sentences can be an occasional consequence of this system secondary to software limitations, ambient noise, and hardware issues  If you have any questions or concerns about the content, text, or information contained within the body of this dictation, please contact the provider for clarification

## 2021-06-08 ENCOUNTER — TELEPHONE (OUTPATIENT)
Dept: CARDIOLOGY CLINIC | Facility: CLINIC | Age: 81
End: 2021-06-08

## 2021-06-08 NOTE — TELEPHONE ENCOUNTER
Dr Vannessa Gonzalez - faxed to you @ [email or web address]  Please have them fax back when complete  Thank you!

## 2021-06-08 NOTE — TELEPHONE ENCOUNTER
Patient stopped in to see if we received the letter from his dentist       I confirmed that we did receive it  RAKEL ANNE is working on it w/ Dr Vania Reilly & we will fax it back to the dentist when it is complete

## 2021-07-30 ENCOUNTER — LAB (OUTPATIENT)
Dept: LAB | Facility: HOSPITAL | Age: 81
End: 2021-07-30
Attending: INTERNAL MEDICINE
Payer: COMMERCIAL

## 2021-07-30 DIAGNOSIS — I25.5 ISCHEMIC CARDIOMYOPATHY: ICD-10-CM

## 2021-07-30 DIAGNOSIS — I25.10 CORONARY ARTERY DISEASE INVOLVING NATIVE CORONARY ARTERY OF NATIVE HEART WITHOUT ANGINA PECTORIS: ICD-10-CM

## 2021-07-30 LAB
ANION GAP SERPL CALCULATED.3IONS-SCNC: 7 MMOL/L (ref 4–13)
BUN SERPL-MCNC: 16 MG/DL (ref 5–25)
CALCIUM SERPL-MCNC: 9.3 MG/DL (ref 8.3–10.1)
CHLORIDE SERPL-SCNC: 103 MMOL/L (ref 100–108)
CHOLEST SERPL-MCNC: 194 MG/DL (ref 50–200)
CO2 SERPL-SCNC: 25 MMOL/L (ref 21–32)
CREAT SERPL-MCNC: 1.07 MG/DL (ref 0.6–1.3)
GFR SERPL CREATININE-BSD FRML MDRD: 66 ML/MIN/1.73SQ M
GLUCOSE P FAST SERPL-MCNC: 185 MG/DL (ref 65–99)
HDLC SERPL-MCNC: 46 MG/DL
LDLC SERPL CALC-MCNC: 112 MG/DL (ref 0–100)
POTASSIUM SERPL-SCNC: 4.1 MMOL/L (ref 3.5–5.3)
SODIUM SERPL-SCNC: 135 MMOL/L (ref 136–145)
TRIGL SERPL-MCNC: 181 MG/DL

## 2021-07-30 PROCEDURE — 80048 BASIC METABOLIC PNL TOTAL CA: CPT

## 2021-07-30 PROCEDURE — 36415 COLL VENOUS BLD VENIPUNCTURE: CPT

## 2021-07-30 PROCEDURE — 80061 LIPID PANEL: CPT

## 2021-08-06 ENCOUNTER — OFFICE VISIT (OUTPATIENT)
Dept: CARDIOLOGY CLINIC | Facility: CLINIC | Age: 81
End: 2021-08-06
Payer: COMMERCIAL

## 2021-08-06 VITALS
SYSTOLIC BLOOD PRESSURE: 136 MMHG | WEIGHT: 185 LBS | DIASTOLIC BLOOD PRESSURE: 74 MMHG | HEIGHT: 70 IN | BODY MASS INDEX: 26.48 KG/M2 | OXYGEN SATURATION: 95 % | HEART RATE: 83 BPM

## 2021-08-06 DIAGNOSIS — I21.4 NSTEMI (NON-ST ELEVATED MYOCARDIAL INFARCTION) (HCC): ICD-10-CM

## 2021-08-06 DIAGNOSIS — I10 ESSENTIAL HYPERTENSION: ICD-10-CM

## 2021-08-06 DIAGNOSIS — E78.5 DYSLIPIDEMIA: Primary | ICD-10-CM

## 2021-08-06 DIAGNOSIS — I25.5 ISCHEMIC CARDIOMYOPATHY: ICD-10-CM

## 2021-08-06 PROCEDURE — 99214 OFFICE O/P EST MOD 30 MIN: CPT | Performed by: INTERNAL MEDICINE

## 2021-08-06 RX ORDER — SPIRONOLACTONE 25 MG/1
25 TABLET ORAL DAILY
Qty: 90 TABLET | Refills: 3 | Status: SHIPPED | OUTPATIENT
Start: 2021-08-06

## 2021-08-06 RX ORDER — ROSUVASTATIN CALCIUM 40 MG/1
40 TABLET, COATED ORAL DAILY
Qty: 90 TABLET | Refills: 3 | Status: SHIPPED | OUTPATIENT
Start: 2021-08-06

## 2021-08-06 RX ORDER — METOPROLOL SUCCINATE 50 MG/1
50 TABLET, EXTENDED RELEASE ORAL DAILY
Qty: 90 TABLET | Refills: 3 | Status: SHIPPED | OUTPATIENT
Start: 2021-08-06

## 2021-08-06 RX ORDER — EZETIMIBE 10 MG/1
10 TABLET ORAL DAILY
Qty: 90 TABLET | Refills: 3 | Status: SHIPPED | OUTPATIENT
Start: 2021-08-06

## 2021-08-06 NOTE — PATIENT INSTRUCTIONS
Stop atorvastatin  Start rosuvastatin for cholesterol  Start ezetimibe for cholesterol  Increase spironolactone to 25 mg  (one full tablet) daily  Blood work in 3 weeks  Echocardiogram  Follow up in 2 months

## 2021-08-06 NOTE — PROGRESS NOTES
Cardiology   Oksana Rinne, MD Chari Lower, MD Ather Mansoor, MD Lehman Sheerer, DO, Shar Sorto DO, Paul Benitez DO, [facility] [place]  -------------------------------------------------------------------  [facility]  [address] [place] [id number]  [phone]                        Jen Muniz                     1940                     [id number]          Assessment/Plan:     1  CAD, subacute inferior myocardial infarction 02/2021 with multivessel CAD noted (100% stenosis of the circumflex and RCA, severe LAD stenosis status post PCI/MOSHE to the proximal/mid LAD 03/01/2021)  2  Ischemic cardiomyopathy, ejection fraction 24%   3  Chronic systolic and diastolic congestive heart failure   4  Hypertension  5  Dyslipidemia     · No symptoms of angina, continue dual anti-platelet therapy with aspirin clopidogrel   · Continue lisinopril 20 mg daily  Increase spironolactone 25 mg daily and metoprolol to 50 mg daily to optimize dosing  BMP in 2-3 weeks   · Change atorvastatin to rosuvastatin for better lipid control and ezetimibe, as recent lipid panel shows LDL cholesterol 112 mg/dL, suboptimally controlled  · Ejection fraction 24%    Will recheck echocardiogram before next visit           Follow up in 2 months after echocardiogram, BMP in 2-3 weeks        Interval History:      This is a very pleasant 68-year-old male who presented to [address] [facility] 02/28/2021 with subacute myocardial infarction   Coronary angiography 03/01/2021 revealed a chronic total occlusion of the mid circumflex, a 100% stenosis of the distal RCA, thought to be the likely culprit of the patient's recent myocardial infarction, and 90% proximal and mid LAD stenosis   It was thought that he had poor distal vessel targets which were extremely small   Therefore he underwent PCI/MOSHE to the proximal/mid LAD with a 3 0 x 15 mm stent   Echocardiogram 03/01/2021 reported ejection fraction 24% with grade 3 diastolic dysfunction   He was discharged on aspirin and Brilinta as well as furosemide 20 mg twice daily  Abe Talavera was fitted with a life vest before discharge      During his initial consultation 05/2021, lisinopril was added to his regimen  His continued on furosemide 20 mg b i d  Clopidogrel was initiated as he discontinued Brilinta on his own  He refused life vest therapy  During his prior visit with me 06/2021, spironolactone 12 5 mg daily was added  He presents today for follow-up with no complaints  He feels well without exertional chest pain, shortness of breath, dizziness, palpitations, lower extremity edema                 Vitals:  Vitals:    08/06/21 0936   BP: 136/74   BP Location: Right arm   Patient Position: Sitting   Cuff Size: Standard   Pulse: 83   SpO2: 95%   Weight: 83 9 kg (185 lb)   Height: 5' 10" (1 778 m)         Past Medical History:   Diagnosis Date    Arthritis     Diabetes mellitus ([facility] )     Hyperlipidemia     NSTEMI (non-ST elevated myocardial infarction) ([facility] )     Osteoporosis      Social History     Socioeconomic History    Marital status: Single     Spouse name: Not on file    Number of children: Not on file    Years of education: Not on file    Highest education level: Not on file   Occupational History    Not on file   Tobacco Use    Smoking status: Current Every Day Smoker     Packs/day: 0 00     Types: Pipe     Start date: 3/5/2011    Smokeless tobacco: Never Used    Tobacco comment: Patient states smokes 3 pipes/day   Vaping Use    Vaping Use: Never used   Substance and Sexual Activity    Alcohol use: Yes     Comment: pt states he usually has a small glass of wine most evenings    Drug use: No    Sexual activity: Not Currently   Other Topics Concern    Not on file   Social History Narrative    Not on file     Social Determinants of Health     Financial Resource Strain:     Difficulty of Paying Living Expenses:    Food Insecurity:     Worried About [facility] in the Last Year:     [facility] in the Last Year:    Transportation Needs:     Lack of Transportation (Medical):      Lack of Transportation (Non-Medical):    Physical Activity:     Days of Exercise per Week:     Minutes of Exercise per Session:    Stress:     Feeling of Stress :    Social Connections:     Frequency of Communication with Friends and Family:     Frequency of Social Gatherings with Friends and Family:     Attends Yazidism Services:     Active Member of Clubs or Organizations:     Attends Club or Organization Meetings:     Marital Status:    Intimate Partner Violence:     Fear of Current or Ex-Partner:     Emotionally Abused:     Physically Abused:     Sexually Abused:       Family History   Problem Relation Age of Onset    No Known Problems Mother     Arthritis Father     Diabetes unspecified Brother      Past Surgical History:   Procedure Laterality Date    TOTAL HIP ARTHROPLASTY         Current Outpatient Medications:     alendronate (FOSAMAX) 70 mg tablet, once a week Takes on saturdays, Disp: , Rfl:     aspirin 81 MG tablet, Take 81 mg by mouth daily , Disp: , Rfl:     clopidogrel (PLAVIX) 75 mg tablet, Take 4 tablets (300 mg total) by mouth once for 1 dose, Disp: 4 tablet, Rfl: 0    cyanocobalamin (VITAMIN B-12) 1,000 mcg tablet, Take 1,000 mcg by mouth daily , Disp: , Rfl:     ergocalciferol (VITAMIN D2) 50,000 units, Take 50,000 Units by mouth once a week wednesdays, Disp: , Rfl: 0    glipiZIDE (GLIPIZIDE XL) 10 mg 24 hr tablet, Take 10 mg by mouth daily , Disp: , Rfl:     insulin NPH-insulin regular (NOVOLIN 70/30 RELION) 100 units/mL subcutaneous injection, Inject 20 Units under the skin 2 (two) times a day , Disp: , Rfl:     ketorolac (ACULAR) 0 5 % ophthalmic solution, instill 1 drop into right eye twice a day, Disp: , Rfl:     lisinopril (ZESTRIL) 20 mg tablet, Take 1 tablet (20 mg total) by mouth daily, Disp: 30 tablet, Rfl: 3    metoprolol succinate (TOPROL-XL) 50 mg 24 hr tablet, Take 1 tablet (50 mg total) by mouth daily, Disp: 90 tablet, Rfl: 3    nitroglycerin (NITROSTAT) 0 4 mg SL tablet, Place 1 tablet (0 4 mg total) under the tongue every 5 (five) minutes as needed for chest pain, Disp: 21 tablet, Rfl: 0    pioglitazone (ACTOS) 45 mg tablet, Take 45 mg by mouth daily , Disp: , Rfl:     spironolactone (ALDACTONE) 25 mg tablet, Take 1 tablet (25 mg total) by mouth daily, Disp: 90 tablet, Rfl: 3    clopidogrel (PLAVIX) 75 mg tablet, Take 1 tablet (75 mg total) by mouth daily (Patient not taking: Reported on 6/3/2021), Disp: 90 tablet, Rfl: 5    ezetimibe (ZETIA) 10 mg tablet, Take 1 tablet (10 mg total) by mouth daily, Disp: 90 tablet, Rfl: 3    furosemide (LASIX) 20 mg tablet, Take 1 tablet (20 mg total) by mouth 2 (two) times a day (Patient not taking: Reported on 6/3/2021), Disp: 30 tablet, Rfl: 3    rosuvastatin (CRESTOR) 40 MG tablet, Take 1 tablet (40 mg total) by mouth daily, Disp: 90 tablet, Rfl: 3        Review of Systems:  Review of Systems   Respiratory: Negative  Cardiovascular: Negative  All other systems reviewed and are negative  Physical Exam:  Physical Exam  Constitutional:       General: He is not in acute distress  Appearance: He is well-developed  He is not diaphoretic  HENT:      Head: Normocephalic and atraumatic  Eyes:      General: No scleral icterus  Right eye: No discharge  Pupils: Pupils are equal, round, and reactive to light  Neck:      Thyroid: No thyromegaly  Cardiovascular:      Rate and Rhythm: Normal rate and regular rhythm  Heart sounds: Normal heart sounds  No murmur heard  No friction rub  No gallop  Pulmonary:      Effort: Pulmonary effort is normal       Breath sounds: Normal breath sounds  Abdominal:      General: There is no distension  Tenderness: There is no abdominal tenderness  There is no guarding or rebound  Musculoskeletal:         General: Normal range of motion  Cervical back: Normal range of motion and neck supple  Skin:     General: Skin is warm and dry  Coloration: Skin is not pale  Findings: No erythema or rash  Neurological:      Mental Status: He is alert and oriented to person, place, and time  Coordination: Coordination normal    Psychiatric:         Behavior: Behavior normal          Thought Content: Thought content normal          Judgment: Judgment normal          This note was completed in part utilizing [employer] Direct Software  Grammatical errors, random word insertions, spelling mistakes, and incomplete sentences can be an occasional consequence of this system secondary to software limitations, ambient noise, and hardware issues  If you have any questions or concerns about the content, text, or information contained within the body of this dictation, please contact the provider for clarification

## 2021-08-30 ENCOUNTER — HOSPITAL ENCOUNTER (OUTPATIENT)
Dept: NON INVASIVE DIAGNOSTICS | Age: 81
Discharge: HOME | End: 2021-08-30
Payer: COMMERCIAL

## 2021-08-30 DIAGNOSIS — I25.5 ISCHEMIC CARDIOMYOPATHY: ICD-10-CM

## 2021-08-30 PROCEDURE — 93308 TTE F-UP OR LMTD: CPT | Performed by: INTERNAL MEDICINE

## 2021-08-30 PROCEDURE — 93325 DOPPLER ECHO COLOR FLOW MAPG: CPT | Performed by: INTERNAL MEDICINE

## 2021-08-30 PROCEDURE — 93321 DOPPLER ECHO F-UP/LMTD STD: CPT | Performed by: INTERNAL MEDICINE

## 2021-08-30 PROCEDURE — 93308 TTE F-UP OR LMTD: CPT

## 2021-10-28 ENCOUNTER — APPOINTMENT (OUTPATIENT)
Dept: LAB | Facility: HOSPITAL | Age: 81
End: 2021-10-28
Attending: INTERNAL MEDICINE
Payer: COMMERCIAL

## 2021-10-28 DIAGNOSIS — E78.5 DYSLIPIDEMIA: ICD-10-CM

## 2021-10-28 DIAGNOSIS — I25.5 ISCHEMIC CARDIOMYOPATHY: ICD-10-CM

## 2021-10-28 LAB
ANION GAP SERPL CALCULATED.3IONS-SCNC: 4 MMOL/L (ref 4–13)
BUN SERPL-MCNC: 17 MG/DL (ref 5–25)
CALCIUM SERPL-MCNC: 9.5 MG/DL (ref 8.3–10.1)
CHLORIDE SERPL-SCNC: 102 MMOL/L (ref 100–108)
CO2 SERPL-SCNC: 29 MMOL/L (ref 21–32)
CREAT SERPL-MCNC: 1.07 MG/DL (ref 0.6–1.3)
GFR SERPL CREATININE-BSD FRML MDRD: 66 ML/MIN/1.73SQ M
GLUCOSE P FAST SERPL-MCNC: 289 MG/DL (ref 65–99)
POTASSIUM SERPL-SCNC: 4.3 MMOL/L (ref 3.5–5.3)
SODIUM SERPL-SCNC: 135 MMOL/L (ref 136–145)

## 2021-10-28 PROCEDURE — 36415 COLL VENOUS BLD VENIPUNCTURE: CPT | Performed by: INTERNAL MEDICINE

## 2021-10-28 PROCEDURE — 80048 BASIC METABOLIC PNL TOTAL CA: CPT | Performed by: INTERNAL MEDICINE

## 2021-11-04 ENCOUNTER — OFFICE VISIT (OUTPATIENT)
Dept: CARDIOLOGY CLINIC | Facility: CLINIC | Age: 81
End: 2021-11-04
Payer: COMMERCIAL

## 2021-11-04 VITALS
HEART RATE: 78 BPM | WEIGHT: 184.2 LBS | DIASTOLIC BLOOD PRESSURE: 80 MMHG | SYSTOLIC BLOOD PRESSURE: 142 MMHG | BODY MASS INDEX: 26.37 KG/M2 | HEIGHT: 70 IN

## 2021-11-04 DIAGNOSIS — E78.5 DYSLIPIDEMIA: ICD-10-CM

## 2021-11-04 DIAGNOSIS — I25.5 ISCHEMIC CARDIOMYOPATHY: ICD-10-CM

## 2021-11-04 DIAGNOSIS — K21.9 GASTROESOPHAGEAL REFLUX DISEASE WITHOUT ESOPHAGITIS: ICD-10-CM

## 2021-11-04 DIAGNOSIS — I10 BENIGN ESSENTIAL HTN: ICD-10-CM

## 2021-11-04 DIAGNOSIS — I25.10 CORONARY ARTERY DISEASE INVOLVING NATIVE CORONARY ARTERY OF NATIVE HEART WITHOUT ANGINA PECTORIS: Primary | ICD-10-CM

## 2021-11-04 PROCEDURE — 99214 OFFICE O/P EST MOD 30 MIN: CPT | Performed by: INTERNAL MEDICINE

## 2021-11-04 RX ORDER — OMEPRAZOLE 20 MG/1
20 CAPSULE, DELAYED RELEASE ORAL DAILY
Qty: 90 CAPSULE | Refills: 3 | Status: SHIPPED | OUTPATIENT
Start: 2021-11-04